# Patient Record
Sex: MALE | Race: WHITE | NOT HISPANIC OR LATINO | Employment: OTHER | URBAN - METROPOLITAN AREA
[De-identification: names, ages, dates, MRNs, and addresses within clinical notes are randomized per-mention and may not be internally consistent; named-entity substitution may affect disease eponyms.]

---

## 2017-02-27 ENCOUNTER — APPOINTMENT (OUTPATIENT)
Dept: LAB | Facility: CLINIC | Age: 67
End: 2017-02-27
Payer: MEDICARE

## 2017-02-27 ENCOUNTER — TRANSCRIBE ORDERS (OUTPATIENT)
Dept: LAB | Facility: CLINIC | Age: 67
End: 2017-02-27

## 2017-02-27 ENCOUNTER — ALLSCRIPTS OFFICE VISIT (OUTPATIENT)
Dept: OTHER | Facility: OTHER | Age: 67
End: 2017-02-27

## 2017-02-27 DIAGNOSIS — I10 ESSENTIAL (PRIMARY) HYPERTENSION: ICD-10-CM

## 2017-02-27 LAB
ALBUMIN SERPL BCP-MCNC: 3.9 G/DL (ref 3.5–5)
ALP SERPL-CCNC: 76 U/L (ref 46–116)
ALT SERPL W P-5'-P-CCNC: 30 U/L (ref 12–78)
ANION GAP SERPL CALCULATED.3IONS-SCNC: 7 MMOL/L (ref 4–13)
AST SERPL W P-5'-P-CCNC: 15 U/L (ref 5–45)
BILIRUB SERPL-MCNC: 0.8 MG/DL (ref 0.2–1)
BUN SERPL-MCNC: 24 MG/DL (ref 5–25)
CALCIUM SERPL-MCNC: 9 MG/DL (ref 8.3–10.1)
CHLORIDE SERPL-SCNC: 103 MMOL/L (ref 100–108)
CHOLEST SERPL-MCNC: 107 MG/DL (ref 50–200)
CO2 SERPL-SCNC: 30 MMOL/L (ref 21–32)
CREAT SERPL-MCNC: 0.99 MG/DL (ref 0.6–1.3)
GFR SERPL CREATININE-BSD FRML MDRD: >60 ML/MIN/1.73SQ M
GLUCOSE SERPL-MCNC: 87 MG/DL (ref 65–140)
HDLC SERPL-MCNC: 44 MG/DL (ref 40–60)
LDLC SERPL CALC-MCNC: 36 MG/DL (ref 0–100)
POTASSIUM SERPL-SCNC: 4.4 MMOL/L (ref 3.5–5.3)
PROT SERPL-MCNC: 7.2 G/DL (ref 6.4–8.2)
SODIUM SERPL-SCNC: 140 MMOL/L (ref 136–145)
TRIGL SERPL-MCNC: 137 MG/DL

## 2017-02-27 PROCEDURE — 36415 COLL VENOUS BLD VENIPUNCTURE: CPT

## 2017-02-27 PROCEDURE — 80053 COMPREHEN METABOLIC PANEL: CPT

## 2017-02-27 PROCEDURE — 80061 LIPID PANEL: CPT

## 2017-03-18 ENCOUNTER — OFFICE VISIT (OUTPATIENT)
Dept: URGENT CARE | Age: 67
End: 2017-03-18
Payer: MEDICARE

## 2017-03-18 PROCEDURE — 99213 OFFICE O/P EST LOW 20 MIN: CPT | Performed by: FAMILY MEDICINE

## 2017-03-18 PROCEDURE — G0463 HOSPITAL OUTPT CLINIC VISIT: HCPCS | Performed by: FAMILY MEDICINE

## 2017-04-25 DIAGNOSIS — I10 ESSENTIAL (PRIMARY) HYPERTENSION: ICD-10-CM

## 2017-04-25 DIAGNOSIS — I25.10 ATHEROSCLEROTIC HEART DISEASE OF NATIVE CORONARY ARTERY WITHOUT ANGINA PECTORIS: ICD-10-CM

## 2017-05-09 ENCOUNTER — HOSPITAL ENCOUNTER (OUTPATIENT)
Dept: NON INVASIVE DIAGNOSTICS | Facility: CLINIC | Age: 67
Discharge: HOME | End: 2017-05-09
Payer: MEDICARE

## 2017-05-09 DIAGNOSIS — I10 ESSENTIAL (PRIMARY) HYPERTENSION: ICD-10-CM

## 2017-05-09 DIAGNOSIS — I25.10 ATHEROSCLEROTIC HEART DISEASE OF NATIVE CORONARY ARTERY WITHOUT ANGINA PECTORIS: ICD-10-CM

## 2017-05-09 LAB
MAX DIASTOLIC BP: 74 MMHG
MAX HEART RATE: 133 BPM
MAX PREDICTED HEART RATE: 152 BPM
MAX. SYSTOLIC BP: 152 MMHG
PROTOCOL NAME: NORMAL
REASON FOR TERMINATION: NORMAL
TARGET HR FORMULA: NORMAL
TEST INDICATION: NORMAL
TIME IN EXERCISE PHASE: 180 S

## 2017-05-09 PROCEDURE — 93017 CV STRESS TEST TRACING ONLY: CPT

## 2017-05-17 ENCOUNTER — TRANSCRIBE ORDERS (OUTPATIENT)
Dept: ADMINISTRATIVE | Facility: HOSPITAL | Age: 67
End: 2017-05-17

## 2017-05-17 DIAGNOSIS — I25.10 ATHEROSCLEROSIS OF NATIVE CORONARY ARTERY WITHOUT ANGINA PECTORIS, UNSPECIFIED WHETHER NATIVE OR TRANSPLANTED HEART: Primary | ICD-10-CM

## 2017-06-20 ENCOUNTER — HOSPITAL ENCOUNTER (OUTPATIENT)
Dept: NON INVASIVE DIAGNOSTICS | Facility: CLINIC | Age: 67
Discharge: HOME | End: 2017-06-20
Payer: MEDICARE

## 2017-06-20 DIAGNOSIS — I25.10 ATHEROSCLEROSIS OF NATIVE CORONARY ARTERY WITHOUT ANGINA PECTORIS, UNSPECIFIED WHETHER NATIVE OR TRANSPLANTED HEART: ICD-10-CM

## 2017-06-20 LAB
CHEST PAIN STATEMENT: NORMAL
MAX DIASTOLIC BP: 72 MMHG
MAX HEART RATE: 122 BPM
MAX PREDICTED HEART RATE: 152 BPM
MAX. SYSTOLIC BP: 118 MMHG
PROTOCOL NAME: NORMAL
REASON FOR TERMINATION: NORMAL
TARGET HR FORMULA: NORMAL
TEST INDICATION: NORMAL
TIME IN EXERCISE PHASE: 180 S

## 2017-06-20 PROCEDURE — 93017 CV STRESS TEST TRACING ONLY: CPT

## 2017-06-20 PROCEDURE — A9502 TC99M TETROFOSMIN: HCPCS

## 2017-06-20 PROCEDURE — 78452 HT MUSCLE IMAGE SPECT MULT: CPT

## 2017-06-20 RX ADMIN — REGADENOSON 0.4 MG: 0.08 INJECTION, SOLUTION INTRAVENOUS at 13:34

## 2017-06-21 ENCOUNTER — GENERIC CONVERSION - ENCOUNTER (OUTPATIENT)
Dept: OTHER | Facility: OTHER | Age: 67
End: 2017-06-21

## 2017-08-18 ENCOUNTER — ALLSCRIPTS OFFICE VISIT (OUTPATIENT)
Dept: OTHER | Facility: OTHER | Age: 67
End: 2017-08-18

## 2018-01-11 NOTE — RESULT NOTES
Verified Results  * NM MYOCARDIAL PERFUSION SPECT (STRESS AND/OR REST) [id number] 11:19AM [facility]     Test Name Result Flag Reference   NM MYOCARDIAL PERFUSION SPECT (STRESS AND/OR REST) (Report)     [facility] [address] [place] [address]   [phone]     Rest/Stress Gated SPECT Myocardial Perfusion Imaging After Regadenoson     Patient: Saurav Smith   MR number: [id number]   Account number: [de-identified]   : 1950   Age: 74 years   Gender: Male   Status: Outpatient   Location: [facility]   Height: 70 in   Weight: 280 lb   BP: 118/ 82 mmHg     Allergies: NO KNOWN ALLERGIES     Diagnosis: I25 10 - Atherosclerotic heart disease of native coronary artery without angina pectoris     Primary Physician: Jacki Funez DO   RN: Heidy Brar RN   Referring Physician: Mirna Darden MD   Technician: Dionte Mir   Group: [facility]   Report Prepared By[de-identified] Heidy Brar RN   Interpreting Physician: Timothy Hurtado DO     INDICATIONS: Evaluation of known coronary artery disease  HISTORY: The patient is a 74year old  male  Chest pain status: no chest pain  Coronary artery disease risk factors: dyslipidemia and hypertension  Cardiovascular history: coronary artery disease  Prior cardiovascular procedures:   percutaneous transluminal coronary angioplasty with a stent placed  Co-morbidity: obesity  Medications: a beta blocker, an ACE inhibitor/ARB, aspirin, and a lipid lowering agent  PHYSICAL EXAM: Baseline physical exam screening: no wheezes audible  REST ECG: Normal sinus rhythm  PROCEDURE: The study was performed at the [facility]  A regadenoson infusion pharmacologic stress test was performed  Gated SPECT myocardial perfusion imaging was performed after stress  Systolic blood pressure was   118 mmHg, at the start of the study   Diastolic blood pressure was 82 mmHg, at the start of the study  The heart rate was 76 bpm, at the start of the study  IV double checked  Regadenoson protocol:   HR bpm SBP mmHg DBP mmHg Symptoms   Baseline 76 118 72 none   Immediate 122 100 70 mild dyspnea   1 min 93 110 76 none   No medications or fluids given  STRESS SUMMARY: Duration of pharmacologic stress was 3 min and 0 sec  Maximal heart rate during stress was 122 bpm ( 80 % of maximal predicted heart rate)  There was normal resting blood pressure with an appropriate response to stress  The   rate-pressure product for the peak heart rate and blood pressure was 10014  There was no chest pain during stress  The stress test was terminated due to protocol completion  Pre oxygen saturation: 98 %  Peak oxygen saturation: 97 %  Arrhythmia during stress: isolated premature ventricular beats  The stress ECG was non-diagnostic  ISOTOPE ADMINISTRATION:   Resting isotope administration Stress isotope administration   Agent Tetrofosmin Tetrofosmin   Dose 16 5 mCi 48 mCi   Date 06/20/2017 06/20/2017   Injection-image interval 37 min 40 min     The radiopharmaceutical was injected at the peak effect of pharmacologic stress  MYOCARDIAL PERFUSION IMAGING:   The image quality was fair  Rotating projection images reveal moderate diaphragmatic attenuation  Left ventricular size was normal  The TID ratio was 0 97  PERFUSION DEFECTS:   - There was a moderate-sized, moderately severe, partially reversible myocardial perfusion defect of the inferior wall  Prone imaging was performed and the defect improved  GATED SPECT:   The calculated left ventricular ejection fraction was 47 %  There was no left ventricular regional abnormality  SUMMARY:   - Stress results: Target heart rate was not achieved  There was no chest pain during stress  - ECG conclusions: The stress ECG was non-diagnostic    - Perfusion imaging:  There was a moderate-sized, moderately severe, partially reversible myocardial perfusion defect of the inferior wall  Prone imaging was performed and the defect improved  - Gated SPECT: The calculated left ventricular ejection fraction was 47 %  There was no left ventricular regional abnormality  IMPRESSIONS: Normal study after pharmacologic vasodilation  Partially reversible defect of the inferior wall which improved with prone imaging likely representing diaphragmatic attenuation       Prepared and signed by     Dougie Art DO   Signed 06/20/2017 18:03:15

## 2018-01-12 NOTE — PROGRESS NOTES
Assessment    1  Degenerative lumbar spinal stenosis (724 02) (M48 06)   2  Adolescent idiopathic scoliosis of lumbar region (737 30) (M41 126)   3  Lumbosacral radiculopathy at L4 (724 4) (M54 17)    Plan  Adolescent idiopathic scoliosis of lumbar region    · * MRI LUMBAR SPINE WO CONTRAST; Status:Hold For - Scheduling; Requested  for:13Nqv3566;    · Follow Up After Tests Complete Evaluation and Treatment  Follow-up  Status: Hold For -  Scheduling  Requested for: 35AET2703  Adolescent idiopathic scoliosis of lumbar region, Degenerative lumbar spinal stenosis,  Lumbosacral radiculopathy at L4    · MethylPREDNISolone 4 MG Oral Tablet Therapy Pack; Take according to directions    Discussion/Summary  Impression: low back pain, disc herniation and radiculopathy  Currently, the condition is severe  The diagnostic plan includes lumbar spine MRI  Medication changes are as documented in orders  Patient discussion: discussed with the patient  No work until after next office visit  Chief Complaint  Acute LBP, ED referral       History of Present Illness  76 yo male with some chronic LBP, took OTC NSAIDS, week ago got progressively worse LBP with radiation down lateral thighs  NOT into feet, no numbness in toes  No loss of sphincter control  Seen in ED [facility] today, plain films neg for fracture  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, truck diver  Review of Systems    Constitutional: No fever or chills, feels well, no tiredness, no recent weight loss or weight gain  Eyes: No complaints of red eyes, no eyesight problems  Cardiovascular: CAD stents  Respiratory: No complaints of shortness of breath, no wheezing, no cough  Gastrointestinal: No complaints of abdominal pain, no constipation, no nausea or vomiting, no diarrhea or bloody stools  Genitourinary: No complaints of dysuria or incontinence, no hesitancy, no nocturia  Musculoskeletal: as noted in HPI     Integumentary: melanoma removal    Neurological: No complaints of headache, no confusion, no numbness or tingling, no dizziness  Psychiatric: No suicidal thoughts, no anxiety, no depression  Endocrine: No muscle weakness, no frequent urination, no excessive thirst, no feelings of weakness  Active Problems    1  Back pain (724 5) (M54 9)   2  Benign essential hypertension (401 1) (I10)   3  Coronary artery disease (414 00) (I25 10)   4  History of Malignant melanoma of back (172 5) (C43 59)   5  Obesity (278 00) (E66 9)    Past Medical History    1  History of Abnormal stress test (794 39) (R94 39)   2  History of Arthritis (V13 4)   3  History of Chest pain (786 50) (R07 9)   4  History of Edema, lower extremity (782 3) (R60 0)   5  History of Malignant melanoma of back (172 5) (C43 59)   6  History of Screening for prostate cancer (V76 44) (Z12 5)    The active problems and past medical history were reviewed and updated today  Surgical History    1  History of Excision Of Lesion Trunk Malignant   2  History of [place] Lymph Node Biopsy   3  History of Transcath Placement Of Intrathoracic Carotid Artery Stent    Family History  Mother    1  Family history of ALS (amyotrophic lateral sclerosis)  Father    2  Family history of Carcinoma Of The Pancreas   3  Family history of Colon Cancer (V16 0)    The family history was reviewed and updated today  Social History    · Denied: History of Alcohol Use (History)   · Former smoker (V15 82) (N01 781)   ·    · History of Never A Smoker  The social history was reviewed and is unchanged  Current Meds   1  Aspirin 81 MG TABS; 1 DAILY; Last Rx:22Jan2016 Ordered   2  Atorvastatin Calcium 40 MG Oral Tablet; TAKE 1 TABLET DAILY AT BEDTIME; Therapy: 12UBB5643 to (Last Rx:20Oct2015)  Requested for: 20Oct2015 Ordered   3  Clopidogrel Bisulfate 75 MG Oral Tablet; TAKE 1 TABLET DAILY; Therapy: 27LRI3461 to (Evaluate:41Cnm0506)  Requested for: 14UGS7986; Last   Rx:22Jan2016 Ordered   4  Lisinopril-Hydrochlorothiazide 10-12 5 MG Oral Tablet; One daily; Therapy: 13GXC7578 to (Evaluate:14Oct2016)  Requested for: 38XLG0720; Last   Rx:33Hcw8883 Ordered   5  Metoprolol Succinate ER 25 MG Oral Tablet Extended Release 24 Hour; Take 1 tablet   daily; Therapy: 62UHL1209 to (Evaluate:79Jvm3403)  Requested for: 16Pic8632; Last   Rx:70Uas2882 Ordered   6  Nitrostat 0 4 MG Sublingual Tablet Sublingual; DISSOLVE 1 TABLET UNDER THE   TONGUE AS NEEDED FOR CHEST PAIN;   Therapy: 13XGN7164 to (Evaluate:56Uda5708)  Requested for: 51TXU1052; Last   Rx:65Ags1456 Ordered    The medication list was reviewed and updated today  Allergies    1  No Known Drug Allergies    2  Seasonal    Vitals  Signs [Data Includes: Current Encounter]   Recorded: 17JAR7097 03:07PM   Heart Rate: 76  Systolic: 915  Diastolic: 78  Weight: 079 lb   BMI Calculated: 40 18  BSA Calculated: 2 41    Physical Exam  able to heel and toe wealk in place and squat  Very tight hip flexors, ++ PPT both ITBs, ++ RSLR with groin and LBP  Trochanters neg, SLR Neg bilat for root tension  Constitutional - General appearance: Abnormal  obese  Musculoskeletal - Gait and station: Normal    Neurologic - Cranial nerves: Normal  Reflexes: Abnormal  Deep tendon reflexes: 1+ right biceps, 1+ left biceps, 1+ right triceps, 1+ left triceps, 1+ right brachioradialis, 1+ left brachioradialis, 0 right patella, 0 left patella, 1+ right ankle jerk and 1+ left ankle jerkno ankle clonus on the right and no ankle clonus on the left  Psychiatric - Orientation to person, place, and time: Normal  Mood and affect: Normal    Eyes   Pupils and irises: Normal        Results/Data  I personally reviewed the films/images/results in the office today  My interpretation follows  X-ray Review Scoliosis, marked DDD, DJD, osteophytes, no obvious listhesis  Future Appointments    Date/Time Provider Specialty Site   09/13/2016 09:30 AM BRY Fernandes   [facility]     Signatures   Electronically signed by : Jose L Sharma DO; May  5 2016  3:55PM EST                       (Author)

## 2018-01-13 NOTE — CONSULTS
Chief Complaint  Pt here for 6 month f/u  pt states he is feeling well with no cardiac concerns  Meds are correct per pt  I refilled meds he requested      History of Present Illness  Ingrid Tolentino is here for followup  He has no chest discomfort, no dyspnea, overall doing well  No issues with his medical therapy  The patient states he has been stable with his coronary artery disease symptoms since the last visit  Symptoms: denies chest pain when at rest, denies exertional chest pain, denies dyspnea, denies fatigue and denies exercise intolerance  Associated symptoms include no syncope, no palpitations, no PND, no orthopnea and no edema  He denies nitroglycerin use since the last visit  Review of Systems      Cardiac: No complaints of chest pain, no palpitations, no fainiting  Skin: No complaints of nonhealing sores or skin rash  Genitourinary: No complaints of recurrent urinary tract infections, frequent urination at night, difficult urination, blood in urine, kidney stones, loss of bladder control, no kidney or prostate problems, no erectile dysfunction  Psychological: No complaints of feeling depressed, anxiety, panic attacks, or difficulty concentrating  General: No complaints of trouble sleeping, lack of energy, fatigue, appetite changes, weight changes, fever, frequent infections, or night sweats  Respiratory: No complaints of shortness of breath, cough with sputum, or wheezing  HEENT: No complaints of serious problems, hearing problems, nose problems, throat problems, or snoring  Gastrointestinal: No complaints of liver problems, nausea, vomiting, heartburn, constipation, bloody stools, diarrhea, problems swallowing, adbominal pain, or rectal bleeding  Hematologic: No complaints of bleeding disorders, anemia, blood clots, or excessive brusing     Neurological: No complaints of numbness, tingling, dizziness, weakness, seizures, headaches, syncope or fainting, AM fatigue, daytime sleepiness, no witnessed apnea episodes  Musculoskeletal: No complaints of arthritis, back pain, or painfull swelling  ROS reviewed  Active Problems    1  Back pain (724 5) (M54 9)   2  Benign essential hypertension (401 1) (I10)   3  Coronary artery disease (414 00) (I25 10)   4  History of Malignant melanoma of back (172 5) (C43 59)   5  Obesity (278 00) (E66 9)    Past Medical History    · History of Abnormal stress test (794 39) (R94 39)   · History of Arthritis (V13 4)   · History of Chest pain (786 50) (R07 9)   · History of Edema, lower extremity (782 3) (R60 0)   · History of Malignant melanoma of back (172 5) (C43 59)   · History of Screening for prostate cancer (V76 44) (Z12 5)    The active problems and past medical history were reviewed and updated today  Surgical History    · History of Excision Of Lesion Trunk Malignant   · History of [place] Lymph Node Biopsy   · History of Transcath Placement Of Intrathoracic Carotid Artery Stent    The surgical history was reviewed and updated today  Family History    · Family history of ALS (amyotrophic lateral sclerosis)    · Family history of Carcinoma Of The Pancreas   · Family history of Colon Cancer (V16 0)    The family history was reviewed and updated today  Social History    · Denied: History of Alcohol Use (History)   · Former smoker (V15 82) (G58 753)   ·    · History of Never A Smoker  The social history was reviewed and updated today  Current Meds   1  Aspirin 81 MG Oral Tablet; Take 2 tablets daily; Therapy: (Recorded:22Jan2016) to Recorded   2  Atorvastatin Calcium 40 MG Oral Tablet; TAKE 1 TABLET DAILY AT BEDTIME; Therapy: 86GRW1729 to (Last Rx:20Oct2015)  Requested for: 20Oct2015 Ordered   3  Clopidogrel Bisulfate 75 MG Oral Tablet; TAKE 1 TABLET DAILY; Therapy: 68NSH1743 to (Priyanka Plbryce)  Requested for: 57BGD4444; Last   Rx:02Mar2015 Ordered   4  Lisinopril-Hydrochlorothiazide 10-12 5 MG Oral Tablet;  One daily; Therapy: 12QJN9962 to (Evaluate:14Oct2016)  Requested for: 10XZQ4225; Last   Rx:20Oct2015 Ordered   5  Metoprolol Succinate ER 25 MG Oral Tablet Extended Release 24 Hour; Take 1 tablet   daily; Therapy: 62JFF6662 to (Nikhil Right)  Requested for: 17ZKJ8938; Last   Rx:02Mar2015 Ordered   6  Nitrostat 0 4 MG Sublingual Tablet Sublingual; DISSOLVE 1 TABLET UNDER THE   TONGUE AS NEEDED FOR CHEST PAIN;   Therapy: 43XAA2502 to (Evaluate:39Tsc6991)  Requested for: 79KKW6691; Last   Rx:26Jan2015 Ordered    The medication list was reviewed and updated today  Allergies    1  No Known Drug Allergies    2  Seasonal    Vitals   Recorded: 88OLY3595 09:13AM   Heart Rate 61   Systolic 040   Diastolic 80   Height 5 ft 10 in   Weight 268 lb 3 04 oz   BMI Calculated 38 48   BSA Calculated 2 37   O2 Saturation 97     Physical Exam    Constitutional   General appearance: No acute distress, well appearing and well nourished  Eyes   Conjunctiva and Sclera examination: Conjunctiva pink, sclera anicteric  Ears, Nose, Mouth, and Throat - Oropharynx: Clear, nares are clear, mucous membranes are moist    Neck   Neck and thyroid: Normal, supple, trachea midline, no thyromegaly  Pulmonary   Respiratory effort: No increased work of breathing or signs of respiratory distress  Auscultation of lungs: Clear to auscultation, no rales, no rhonchi, no wheezing, good air movement  Cardiovascular   Auscultation of heart: Normal rate and rhythm, normal S1 and S2, no murmurs  Carotid pulses: Normal, 2+ bilaterally  Peripheral vascular exam: Normal pulses throughout, no tenderness, erythema or swelling  Pedal pulses: Normal, 2+ bilaterally  Examination of extremities for edema and/or varicosities: Normal     Abdomen   Abdomen: Non-tender and no distention  Liver and spleen: No hepatomegaly or splenomegaly  Musculoskeletal Gait and station: Normal gait   Digits and nails: Normal without clubbing or cyanosis  Inspection/palpation of joints, bones, and muscles: Normal, ROM normal     Skin - Skin and subcutaneous tissue: Normal without rashes or lesions  Skin is warm and well perfused, normal turgor  Neurologic - Speech: Normal     Psychiatric - Orientation to person, place, and time: Normal  Mood and affect: Normal       Results/Data  I personally reviewed the recording/images in the office today  My interpretation follows  (1) LIPID PANEL FASTING W DIRECT LDL REFLEX 61TKS9959 11:08AM [facility] Order Number: [id number]     Test Name Result Flag Reference   TRIGLYCERIDES 121 mg/dL     TRIGLYCERIDE:  Normal              <150 mg/dl  Borderline High    150-199 mg/dl  High               200-499 mg/dl  Very High          >499 mg/dl  _______________________________________   CHOLESTEROL 107 mg/dL     CHOLESTEROL:  Desirable        <200 mg/dl  Borderline High  200-239 mg/dl  High             >239 mg/dl  ____________________________________   HDL,DIRECT 36 mg/dL     HDL:  High       >59 mg/dl  Low        <41 mg/dl  ______________________________   LDL CHOLESTEROL CALCULATED 47 mg/dL  0-100   LDL, CALCULATED:  This screening LDL is a calculated result  It does not have the accuracy of the Direct Measured LDL in the  monitoring of patients with hyperlipidemia and/or statin therapy  Direct Measured LDL (Test Code 3126) must be ordered separately in these  patients   ______________________________     (1) HEPATIC FUNCTION PANEL 10Fbf3103 11:08AM [facility] Order Number: [id number]     Test Name Result Flag Reference   BILI, TOTAL 1 3 mg/dL H 0 2-1 0   TOTAL PROTEIN 7 7 g/dL  6 4-8 2   ALK PHOSPHATAS 81 U/L     ALT (SGPT) 14 U/L  14-59   New Reference Range   AST(SGOT) 34 U/L  10-42   BILI, DIRECT 0 3 mg/dL  0 0-0 3   ALBUMIN 4 0 g/dL  3 5-5 0     Assessment    1  Coronary artery disease (414 00) (I25 10)   2   Benign essential hypertension (401 1) (I10)    Plan  Coronary artery disease    · Clopidogrel Bisulfate 75 MG Oral Tablet; TAKE 1 TABLET DAILY   Rx By: Shell Fuentes; Dispense: 30 Days ; #:30 Tablet; Refill: 0; For: Coronary artery disease; FEDE = N; Verified Transmission to [facility]; Last Updated By: System, [employer]; 1/22/2016 9:28:24 AM   · Follow-up visit in 6 months Evaluation and Treatment  Follow-up  Status: Complete   Done: 24TSQ9897   Ordered; For: Coronary artery disease; Ordered By: Shell Fuentes Performed:  Due: 49BJK5242; Last Updated By: Liseth Manzanares; 1/22/2016 9:32:32 AM  Health Maintenance    · From  Aspirin 81 MG Oral Tablet Take 2 tablets daily To Aspirin 81 MG Oral  Tablet 1 DAILY   Rx By: Shell Fuentes; Dispense: 0 Days ; #:30 Tablet; Refill: 1; For: Health Maintenance; FEDE = N; Record    Discussion/Summary    1  Coronary Artery Disease: Overall doing well  Asymptomatic  Continue above medical therapy  I ordered a lipid panel  Dual antiplalelet therapy for at least 1 year  Okay to stop clopidogrel after february  LDL at goal      2  HTN: BP well controlled on current medical therapy  The patient was counseled regarding diagnostic results, instructions for management, risk factor reductions, impressions  total time of encounter was 25 minutes and 15 minutes was spent counseling        Future Appointments    Signatures   Electronically signed by : BRY Hughes ; Jan 22 2016  9:33AM EST                       (Author)

## 2018-01-14 VITALS
BODY MASS INDEX: 41.52 KG/M2 | HEIGHT: 70 IN | HEART RATE: 74 BPM | WEIGHT: 290 LBS | SYSTOLIC BLOOD PRESSURE: 122 MMHG | DIASTOLIC BLOOD PRESSURE: 60 MMHG

## 2018-01-14 VITALS
BODY MASS INDEX: 40.96 KG/M2 | HEIGHT: 70 IN | SYSTOLIC BLOOD PRESSURE: 138 MMHG | HEART RATE: 71 BPM | DIASTOLIC BLOOD PRESSURE: 76 MMHG | WEIGHT: 286.13 LBS

## 2018-01-15 NOTE — MISCELLANEOUS
Message  Lavonne (long term POA) called about no effect from meds (see task)   and had MRI this am spent 35 minutes trying to get a stat reading from [facility]   in interim she showed up here to get a stronger pain med   showed her mri and explained nature of severe stenosis and pinched nerves in both legs   switched oxy to tapentadol 75 mg, stop oxy    finish Medrol and sent for ASAP Pain Medicine consult for MATTHEW   discussed possibility of need for surgery  Plan  Adolescent idiopathic scoliosis of lumbar region    · Follow Up After Tests Complete Evaluation and Treatment  Follow-up  Status: Complete   Done: 48UEV5533   · * MRI LUMBAR SPINE WO CONTRAST; Status:Complete;   Done: 40UVP8140 09:22AM  Adolescent idiopathic scoliosis of lumbar region, Degenerative lumbar spinal stenosis,  Lumbosacral radiculopathy at L4    · MethylPREDNISolone 4 MG Oral Tablet Therapy Pack;  Take according to  directions  Back pain, Degenerative lumbar spinal stenosis, Lumbosacral radiculopathy at L4    · Nucynta 75 MG Oral Tablet; one tablet every 6 hours as needed for severe pain up  to four doses in 24 hours  Lumbosacral radiculopathy at L4    · 1 - Tony Senior MD, Kriss DORADO (Pain Management) Physician Referral  Consult L-3  radiculopathy with reflex changes, marked stenosis  Status: Active  Requested for:  70VCJ4060  Care Summary provided  : Yes    Signatures   Electronically signed by : Micah Rust DO; May  6 2016  4:52PM EST                       (Author)

## 2018-01-15 NOTE — MISCELLANEOUS
Message   Recorded as Task   Date: 05/12/2016 03:43 PM, Created By: John Cline   Task Name: Follow Up   Assigned To: Maribeth Fernandez   Regarding Patient: Gianni Cooper, Status: In Progress   Comment:    Dorys Boucher - 12 May 2016 3:43 PM     TASK CREATED  Patient was seen by Dr Nancy Morse todayfor back pain  Dr Nancy Morse ordered a LESI> I went into the room and asked the patient if he was on any blood thinning medication ie  Warfarin or Aspirin  Patient stated he was taking Aspirin because his Cardiologist, Dr Edwige Garza instructed him to  I informed the patient that we would not be able to schedule the LESI until his cardiologist said it was ok to stop the aspirin in order to get this procedure done  Anne-Marie, who was in the room with him said "No he needs to have this done now"  I informed her i will fax the hold request right now but that we couldn't schedule without the Aspirin hold being authorized and she pointed her finger at me and told me that i was going to schedule it today and again I told her that we couldn't schedule her wiothout the cardiologist saying it was ok to hold the Aspirin  Anne-Marie was very upset and in a loud and rude tone she asked "What if I take the form to his cardiologist in [facility] and bring it back signed  I  informed her that if thats what she wants to do that fine  I still faxed the form over to Dr Joselin pham  Patient and Britni proceeded to wait for a while and then decided to leave  The patient and Britni returned in the afternoon stating that she just spoke to Dr Joselin pham and that they were going to fax the Aspirin hold to us and that i should call them  I informed her that if they just faxed it that we should give it a few minutes so it can come through  Anne-Marie insisted in call so I did call  Dr Joselin Green office and they said that they faxed it  It went to [facility]  I gave them another fax [phone] and ask if they can it again   The form was refaxed but it didn't go through the number i provided  In the mean time the patient was being very nasty to the Ortho girls ( [place] [facility] and [facility]) pointing her finger and stating that she is going to [address] [place] [address] to get the hold and when she comes back we better schedule her immediately and left the office  I asked Ashlee at [facility] to see if she could help me get from the [facility] fax and she did  I then gave the Aspirin hold to Dorita Villarreal to schedule the procedure  Dorita Villarreal called the patient and left a message saying that she  already obtained the hold to call our office to schedule the procedure  MikeCarl - 90 May 2016 9:08 AM     TASK EDITED  aware        Active Problems    1  Adolescent idiopathic scoliosis of lumbar region (737 30) (M41 126)   2  Back pain (724 5) (M54 9)   3  Benign essential hypertension (401 1) (I10)   4  Coronary artery disease (414 00) (I25 10)   5  Degenerative lumbar spinal stenosis (724 02) (M48 06)   6  Low back pain (724 2) (M54 5)   7  Lumbar disc herniation with radiculopathy (722 10) (M51 16)   8  Lumbar spinal stenosis (724 02) (M48 06)   9  Lumbosacral radiculopathy at L4 (724 4) (M54 17)   10  History of Malignant melanoma of back (172 5) (C43 59)   11  Obesity (278 00) (E66 9)    Current Meds   1  Aspirin 81 MG TABS; 1 DAILY; Last Rx:22Jan2016 Ordered   2  Atorvastatin Calcium 40 MG Oral Tablet; TAKE 1 TABLET DAILY AT BEDTIME; Therapy: 76QMW5906 to (Last Rx:20Oct2015)  Requested for: 20Oct2015 Ordered   3  Clopidogrel Bisulfate 75 MG Oral Tablet; TAKE 1 TABLET DAILY; Therapy: 76BWA6077 to (Evaluate:99Gtn4549)  Requested for: 21ZGT3656; Last   Rx:22Jan2016 Ordered   4  Lisinopril-Hydrochlorothiazide 10-12 5 MG Oral Tablet; One daily; Therapy: 29XCS9664 to (Evaluate:14Oct2016)  Requested for: 10FSK7268; Last   Rx:20Oct2015 Ordered   5  MethylPREDNISolone 4 MG Oral Tablet Therapy Pack; Take according to directions; Therapy: 43NRQ7045 to (Last FN:06IVM9120)  Requested for: 11WLA0874 Ordered   6   Metoprolol Succinate ER 25 MG Oral Tablet Extended Release 24 Hour; Take 1 tablet   daily; Therapy: 62AIE7574 to (Evaluate:04Gku9424)  Requested for: 79Aku7159; Last   Rx:15Apr2016 Ordered   7  Nitrostat 0 4 MG Sublingual Tablet Sublingual; DISSOLVE 1 TABLET UNDER THE   TONGUE AS NEEDED FOR CHEST PAIN;   Therapy: 10QQX9521 to (Evaluate:08Gun1458)  Requested for: 60NAZ9621; Last   Rx:26Jan2015 Ordered   8  Nucynta 75 MG Oral Tablet; one tablet every 6 hours as needed for severe pain up to four   doses in 24 hours; Therapy: 30JIR4890 to (Last UK:59LCE1670) Ordered    Allergies    1  No Known Drug Allergies    2   Seasonal    Signatures   Electronically signed by : Sammie Senior, ; May 18 2016  9:09AM EST                       (Author)

## 2018-01-16 NOTE — MISCELLANEOUS
Message   Recorded as Task   Date: 05/27/2016 09:54 AM, Created By: Cheryl Fleming   Task Name: Follow Up   Assigned To: [facility] [address] Procedure,Team   Regarding Patient: Elsy Mello, Status: Active   Comment:    Dorys Boucher - 27 May 2016 9:54 AM     TASK CREATED  L/m to return call  S/p L2-L3 LESI /ASPIRIN done 5/20/16 by Dorys Martinez - 27 May 2016 10:21 AM     TASK EDITED   Dorys Boucher - 03 Jun 2016 2:08 PM     TASK EDITED  Patient was called on 6/2/16  I l/m to return call  S/p L2-L3 LESI /ASPIRIN done 5/20/16 done by Eli Bonner - 07 Jun 2016 12:53 PM     TASK EDITED  Pt lmom stating that he had an epidural and was calling back  His pain has been reduced and he was able to return to work  " Saved my life, its a miracle and thank you very much "   [address] [employer] [address] - 07 Jun 2016 1:03 PM     TASK REPLIED TO: Previously Assigned To [place]  Thanks  Active Problems    1  Adolescent idiopathic scoliosis of lumbar region (737 30) (M41 126)   2  Back pain (724 5) (M54 9)   3  Benign essential hypertension (401 1) (I10)   4  Coronary artery disease (414 00) (I25 10)   5  Degenerative lumbar spinal stenosis (724 02) (M48 06)   6  Low back pain (724 2) (M54 5)   7  Lumbar disc herniation with radiculopathy (722 10) (M51 16)   8  Lumbar spinal stenosis (724 02) (M48 06)   9  Lumbosacral radiculopathy at L4 (724 4) (M54 17)   10  History of Malignant melanoma of back (172 5) (C43 59)   11  Obesity (278 00) (E66 9)    Current Meds   1  Aspirin 81 MG TABS; 1 DAILY; Last Rx:22Jan2016 Ordered   2  Atorvastatin Calcium 40 MG Oral Tablet; TAKE 1 TABLET DAILY AT BEDTIME; Therapy: 91OSW1644 to (Last Rx:20Oct2015)  Requested for: 20Oct2015 Ordered   3  Clopidogrel Bisulfate 75 MG Oral Tablet; TAKE 1 TABLET DAILY; Therapy: 26QST4102 to (Evaluate:69Bvh9689)  Requested for: 77PJH5806; Last   Rx:22Jan2016 Ordered   4  Lisinopril-Hydrochlorothiazide 10-12 5 MG Oral Tablet;  One daily; Therapy: 09GKK2488 to (Evaluate:14Oct2016)  Requested for: 45NCR3807; Last   Rx:11Bzt6986 Ordered   5  MethylPREDNISolone 4 MG Oral Tablet Therapy Pack; Take according to directions; Therapy: 70QXB8430 to (Last MV:57XMZ2204)  Requested for: 72SDV2559 Ordered   6  Metoprolol Succinate ER 25 MG Oral Tablet Extended Release 24 Hour; Take 1 tablet   daily; Therapy: 42DDC5651 to (Evaluate:84Bey6431)  Requested for: 28Ujl3218; Last   Rx:67Kwm6399 Ordered   7  Nitrostat 0 4 MG Sublingual Tablet Sublingual; DISSOLVE 1 TABLET UNDER THE   TONGUE AS NEEDED FOR CHEST PAIN;   Therapy: 08DUL7696 to (Evaluate:85Kth1944)  Requested for: 50VIV6142; Last   Rx:26Jan2015 Ordered   8  Nucynta 75 MG Oral Tablet; one tablet every 6 hours as needed for severe pain up to four   doses in 24 hours; Therapy: 95YSD8540 to (Last YH:25BGI4729) Ordered    Allergies    1  No Known Drug Allergies    2   Seasonal    Signatures   Electronically signed by : Jose Crandall RN; Jun 7 2016  2:13PM EST                       (Author)

## 2018-01-16 NOTE — RESULT NOTES
Verified Results  * MRI LUMBAR SPINE [address] [employer] [id number] 09:22AM Eliana Mo Order Number: [id number]   Performing Comments: Pt had stent placed Feb 2015   has MR compatability info card with him  - Patient Instructions: To schedule this appointment, please contact Central Scheduling at [phone]  Test Name Result Flag Reference   MRI LUMBAR SPINE WO CONTRAST (Report)     MRI LUMBAR SPINE WITHOUT CONTRAST     INDICATION: Severe low back pain with bilateral lower extremity radiculopathy  COMPARISON: None  TECHNIQUE: Sagittal T1, sagittal T2, sagittal inversion recovery, axial T1 and axial T2, coronal T2       IMAGE QUALITY: Diagnostic     FINDINGS:     ALIGNMENT: Mild dextroscoliosis of the thoracolumbar spine  No compression fracture  No spondylolisthesis or spondylolysis  MARROW SIGNAL: Mild endplate marrow degenerative change  No focal marrow edema  DISTAL CORD AND CONUS: Normal size and signal within the distal cord and conus  The conus ends at the L1-L2 level  PARASPINAL SOFT TISSUES: Paraspinal soft tissues are unremarkable  SACRUM: Normal signal within the sacrum  No evidence of insufficiency or stress fracture  LOWER THORACIC DISC SPACES: Mild lower thoracic degenerative disc disease without disc herniation, canal stenosis or foraminal narrowing  LUMBAR DISC SPACES:        L1-L2: Disc desiccation and loss of disc height  Moderate diffuse annular bulging with broad-based left foraminal and lateral disc protrusion  Mild to moderate canal stenosis with moderately severe left foraminal narrowing  L2-L3: Moderate diffuse annular bulging  Small broad-based central disc herniation and small broad-based left foraminal disc herniation  Severe canal stenosis with moderately severe left and mild right foraminal narrowing  L3-L4: Mild diffuse annular bulging  Mild facet degenerative change  Mild canal stenosis without foraminal narrowing       L4-L5: Moderate diffuse annular bulging with broad-based right foraminal and far lateral disc protrusion  Mild facet arthropathy  Moderate canal stenosis and right foraminal narrowing  L5-S1: Moderate diffuse annular bulging  Broad-based right foraminal and lateral disc protrusion with endplate osteophyte formation  Mild canal stenosis  Moderately severe right foraminal narrowing  IMPRESSION:     Multilevel diffuse annular bulging with broad-based predominantly foraminal and lateral disc protrusions  Endplate and facet hypertrophic degenerative change noted  Central disc protrusion at L2-3 superimposed upon annular bulging and degenerative disc disease  There is also a left foraminal disc protrusion at this level  Severe canal stenosisl  There is moderately severe left foraminal narrowing at this level  Severe left foraminal narrowing at L1-2 and right greater than left foraminal narrowing at L4-5 and L5-S1  Spine surgery consultation recommended  Workstation performed: [id number]     Signed by:   Taisha Keller DO   5/6/16       Plan  Adolescent idiopathic scoliosis of lumbar region    · Follow Up After Tests Complete Evaluation and Treatment  Follow-up  Status: Complete   Done: 10BNL6490   · * MRI LUMBAR SPINE WO CONTRAST; Status:Complete;   Done: 28IJE1351 09:22AM  Adolescent idiopathic scoliosis of lumbar region, Degenerative lumbar spinal stenosis,  Lumbosacral radiculopathy at L4    · MethylPREDNISolone 4 MG Oral Tablet Therapy Pack; Take according to  directions  Back pain, Degenerative lumbar spinal stenosis, Lumbosacral radiculopathy at L4    · Nucynta 75 MG Oral Tablet; one tablet every 6 hours as needed for severe pain up  to four doses in 24 hours  Lumbosacral radiculopathy at L4    · 1 - Fercho Haddad MD, Jorge DORADO (Pain Management) Physician Referral  Consult L-3  radiculopathy with reflex changes, marked stenosis  Status: Active  Requested for:  69EVP3885  Care Summary provided   Monika Mckinney Yes

## 2018-01-17 NOTE — PROGRESS NOTES
Assessment  Assessed    1  Coronary artery disease (414 00) (I25 10)   2  Benign essential hypertension (401 1) (I10)    Plan  Coronary artery disease    · Clopidogrel Bisulfate 75 MG Oral Tablet; TAKE 1 TABLET DAILY   Rx By: Carlito Butt; Dispense: 30 Days ; #:30 Tablet; Refill: 0; For: Coronary artery disease; FEDE = N; Verified Transmission to [facility]; Last Updated By: System, [employer]; 1/22/2016 9:28:24 AM   · Follow-up visit in 6 months Evaluation and Treatment  Follow-up  Status: Complete   Done: 51NGW5683   Ordered; For: Coronary artery disease; Ordered By: Carlito Butt Performed:  Due: 25VHT6773; Last Updated By: Judd Villar; 1/22/2016 9:32:32 AM  Health Maintenance    · From  Aspirin 81 MG Oral Tablet Take 2 tablets daily To Aspirin 81 MG Oral  Tablet 1 DAILY   Rx By: Carlito Butt; Dispense: 0 Days ; #:30 Tablet; Refill: 1; For: Health Maintenance; FEDE = N; Record    Discussion/Summary  Cardiology Discussion Summary Free Text Note Form [facility]:   1  Coronary Artery Disease: Overall doing well  Asymptomatic  Continue above medical therapy  I ordered a lipid panel  Dual antiplalelet therapy for at least 1 year  Okay to stop clopidogrel after february  LDL at goal      2  HTN: BP well controlled on current medical therapy  Counseling Documentation With Imm: The patient was counseled regarding diagnostic results, instructions for management, risk factor reductions, impressions  total time of encounter was 25 minutes and 15 minutes was spent counseling  Chief Complaint  Chief Complaint Free Text Note Form: Pt here for 6 month f/u  pt states he is feeling well with no cardiac concerns  Meds are correct per pt  I refilled meds he requested      History of Present Illness  Cardiology HPI Free Text Note Form [facility]: Bharathi Gomez is here for followup  He has no chest discomfort, no dyspnea, overall doing well  No issues with his medical therapy  Coronary Artery Disease (Follow-Up):  The patient states he has been stable with his coronary artery disease symptoms since the last visit  Symptoms: denies chest pain when at rest, denies exertional chest pain, denies dyspnea, denies fatigue and denies exercise intolerance  Associated symptoms include no syncope, no palpitations, no PND, no orthopnea and no edema  He denies nitroglycerin use since the last visit  Review of Systems  Cardiology Male ROS:     Cardiac: No complaints of chest pain, no palpitations, no fainiting  Skin: No complaints of nonhealing sores or skin rash  Genitourinary: No complaints of recurrent urinary tract infections, frequent urination at night, difficult urination, blood in urine, kidney stones, loss of bladder control, no kidney or prostate problems, no erectile dysfunction  Psychological: No complaints of feeling depressed, anxiety, panic attacks, or difficulty concentrating  General: No complaints of trouble sleeping, lack of energy, fatigue, appetite changes, weight changes, fever, frequent infections, or night sweats  Respiratory: No complaints of shortness of breath, cough with sputum, or wheezing  HEENT: No complaints of serious problems, hearing problems, nose problems, throat problems, or snoring  Gastrointestinal: No complaints of liver problems, nausea, vomiting, heartburn, constipation, bloody stools, diarrhea, problems swallowing, adbominal pain, or rectal bleeding  Hematologic: No complaints of bleeding disorders, anemia, blood clots, or excessive brusing  Neurological: No complaints of numbness, tingling, dizziness, weakness, seizures, headaches, syncope or fainting, AM fatigue, daytime sleepiness, no witnessed apnea episodes  Musculoskeletal: No complaints of arthritis, back pain, or painfull swelling  ROS Reviewed:   ROS reviewed  Active Problems  Problems    1  Back pain (724 5) (M54 9)   2  Benign essential hypertension (401 1) (I10)   3   Coronary artery disease (414 00) (I25 10)   4  History of Malignant melanoma of back (172 5) (C43 59)   5  Obesity (278 00) (E66 9)    Past Medical History  Problems    1  History of Abnormal stress test (794 39) (R94 39)   2  History of Arthritis (V13 4)   3  History of Chest pain (786 50) (R07 9)   4  History of Edema, lower extremity (782 3) (R60 0)   5  History of Malignant melanoma of back (172 5) (C43 59)   6  History of Screening for prostate cancer (V76 44) (Z12 5)  Active Problems And Past Medical History Reviewed: The active problems and past medical history were reviewed and updated today  Surgical History  Problems    1  History of Excision Of Lesion Trunk Malignant   2  History of [place] Lymph Node Biopsy   3  History of Transcath Placement Of Intrathoracic Carotid Artery Stent  Surgical History Reviewed: The surgical history was reviewed and updated today  Family History  Mother    1  Family history of ALS (amyotrophic lateral sclerosis)  Father    2  Family history of Carcinoma Of The Pancreas   3  Family history of Colon Cancer (V16 0)  Family History Reviewed: The family history was reviewed and updated today  Social History  Problems    · Denied: History of Alcohol Use (History)   · Former smoker (V15 82) (Z44 021)   ·    · History of Never A Smoker  Social History Reviewed: The social history was reviewed and updated today  Current Meds   1  Aspirin 81 MG Oral Tablet; Take 2 tablets daily; Therapy: (Recorded:22Jan2016) to Recorded   2  Atorvastatin Calcium 40 MG Oral Tablet; TAKE 1 TABLET DAILY AT BEDTIME; Therapy: 17EKW9452 to (Last Rx:20Oct2015)  Requested for: 20Oct2015 Ordered   3  Clopidogrel Bisulfate 75 MG Oral Tablet; TAKE 1 TABLET DAILY; Therapy: 42USE8538 to (Irais Zambrano)  Requested for: 68IQK8263; Last   Rx:02Mar2015 Ordered   4  Lisinopril-Hydrochlorothiazide 10-12 5 MG Oral Tablet; One daily;    Therapy: 49KNH6947 to (Evaluate:14Oct2016)  Requested for: 36IQX7326; Last   Rx:20Oct2015 Ordered   5  Metoprolol Succinate ER 25 MG Oral Tablet Extended Release 24 Hour; Take 1 tablet   daily; Therapy: 60IYN3342 to (Ricka Nyhan)  Requested for: 43JVB5097; Last   Rx:02Mar2015 Ordered   6  Nitrostat 0 4 MG Sublingual Tablet Sublingual; DISSOLVE 1 TABLET UNDER THE   TONGUE AS NEEDED FOR CHEST PAIN;   Therapy: 68SUS5725 to (Evaluate:49Uiz1712)  Requested for: 76TZB2254; Last   SZ:61SZN1270 Ordered  Medication List Reviewed: The medication list was reviewed and updated today  Allergies  Medication    1  No Known Drug Allergies  Non-Medication    2  Seasonal    Vitals  Vital Signs [Data Includes: Current Encounter]    Recorded: 14HXV2893 09:13AM   Heart Rate 61   Systolic 635   Diastolic 80   Height 5 ft 10 in   Weight 268 lb 3 04 oz   BMI Calculated 38 48   BSA Calculated 2 37   O2 Saturation 97     Physical Exam    Constitutional   General appearance: No acute distress, well appearing and well nourished  Eyes   Conjunctiva and Sclera examination: Conjunctiva pink, sclera anicteric  Ears, Nose, Mouth, and Throat - Oropharynx: Clear, nares are clear, mucous membranes are moist    Neck   Neck and thyroid: Normal, supple, trachea midline, no thyromegaly  Pulmonary   Respiratory effort: No increased work of breathing or signs of respiratory distress  Auscultation of lungs: Clear to auscultation, no rales, no rhonchi, no wheezing, good air movement  Cardiovascular   Auscultation of heart: Normal rate and rhythm, normal S1 and S2, no murmurs  Carotid pulses: Normal, 2+ bilaterally  Peripheral vascular exam: Normal pulses throughout, no tenderness, erythema or swelling  Pedal pulses: Normal, 2+ bilaterally  Examination of extremities for edema and/or varicosities: Normal     Abdomen   Abdomen: Non-tender and no distention  Liver and spleen: No hepatomegaly or splenomegaly  Musculoskeletal Gait and station: Normal gait   Digits and nails: Normal without clubbing or cyanosis  Inspection/palpation of joints, bones, and muscles: Normal, ROM normal     Skin - Skin and subcutaneous tissue: Normal without rashes or lesions  Skin is warm and well perfused, normal turgor  Neurologic - Speech: Normal     Psychiatric - Orientation to person, place, and time: Normal  Mood and affect: Normal       Results/Data  Diagnostic Studies Reviewed Cardio: I personally reviewed the recording/images in the office today  My interpretation follows  Results   (1) LIPID PANEL FASTING W DIRECT LDL REFLEX [id number] 11:08AM [facility] Order Number: [id number]     Test Name Result Flag Reference   TRIGLYCERIDES 121 mg/dL     TRIGLYCERIDE:  Normal              <150 mg/dl  Borderline High    150-199 mg/dl  High               200-499 mg/dl  Very High          >499 mg/dl  _______________________________________   CHOLESTEROL 107 mg/dL     CHOLESTEROL:  Desirable        <200 mg/dl  Borderline High  200-239 mg/dl  High             >239 mg/dl  ____________________________________   HDL,DIRECT 36 mg/dL     HDL:  High       >59 mg/dl  Low        <41 mg/dl  ______________________________   LDL CHOLESTEROL CALCULATED 47 mg/dL  0-100   LDL, CALCULATED:  This screening LDL is a calculated result  It does not have the accuracy of the Direct Measured LDL in the  monitoring of patients with hyperlipidemia and/or statin therapy    Direct Measured LDL (Test Code 1163) must be ordered separately in these  patients   ______________________________     (1) HEPATIC FUNCTION PANEL 47Bzx5889 11:08AM [facility] Order Number: [id number]     Test Name Result Flag Reference   BILI, TOTAL 1 3 mg/dL H 0 2-1 0   TOTAL PROTEIN 7 7 g/dL  6 4-8 2   ALK PHOSPHATAS 81 U/L     ALT (SGPT) 14 U/L  14-59   New Reference Range   AST(SGOT) 34 U/L  10-42   BILI, DIRECT 0 3 mg/dL  0 0-0 3   ALBUMIN 4 0 g/dL  3 5-5 0     Future Appointments    Date/Time Provider Specialty Site   09/13/2016 09:30 AM Melita Titus Janyth Schirmer, M D   Surgical Oncology [facility]     Signatures   Electronically signed by : BRY Carroll ; Jan 22 2016  9:33AM EST                       (Author)

## 2018-02-16 ENCOUNTER — OFFICE VISIT (OUTPATIENT)
Dept: CARDIOLOGY CLINIC | Facility: CLINIC | Age: 68
End: 2018-02-16
Payer: MEDICARE

## 2018-02-16 ENCOUNTER — APPOINTMENT (OUTPATIENT)
Dept: LAB | Facility: CLINIC | Age: 68
End: 2018-02-16
Payer: MEDICARE

## 2018-02-16 VITALS
HEART RATE: 73 BPM | DIASTOLIC BLOOD PRESSURE: 84 MMHG | SYSTOLIC BLOOD PRESSURE: 120 MMHG | WEIGHT: 273 LBS | BODY MASS INDEX: 39.08 KG/M2 | HEIGHT: 70 IN

## 2018-02-16 DIAGNOSIS — I25.10 CAD (CORONARY ARTERY DISEASE): ICD-10-CM

## 2018-02-16 DIAGNOSIS — I25.10 CAD (CORONARY ARTERY DISEASE): Primary | ICD-10-CM

## 2018-02-16 LAB
ALBUMIN SERPL BCP-MCNC: 4 G/DL (ref 3.5–5)
ALP SERPL-CCNC: 68 U/L (ref 46–116)
ALT SERPL W P-5'-P-CCNC: 20 U/L (ref 12–78)
ANION GAP SERPL CALCULATED.3IONS-SCNC: 6 MMOL/L (ref 4–13)
AST SERPL W P-5'-P-CCNC: 15 U/L (ref 5–45)
BILIRUB SERPL-MCNC: 1.3 MG/DL (ref 0.2–1)
BUN SERPL-MCNC: 22 MG/DL (ref 5–25)
CALCIUM SERPL-MCNC: 9.5 MG/DL (ref 8.3–10.1)
CHLORIDE SERPL-SCNC: 104 MMOL/L (ref 100–108)
CHOLEST SERPL-MCNC: 111 MG/DL (ref 50–200)
CO2 SERPL-SCNC: 28 MMOL/L (ref 21–32)
CREAT SERPL-MCNC: 0.98 MG/DL (ref 0.6–1.3)
GFR SERPL CREATININE-BSD FRML MDRD: 78 ML/MIN/1.73SQ M
GLUCOSE P FAST SERPL-MCNC: 83 MG/DL (ref 65–99)
HDLC SERPL-MCNC: 42 MG/DL (ref 40–60)
LDLC SERPL CALC-MCNC: 48 MG/DL (ref 0–100)
POTASSIUM SERPL-SCNC: 4.5 MMOL/L (ref 3.5–5.3)
PROT SERPL-MCNC: 7.6 G/DL (ref 6.4–8.2)
SODIUM SERPL-SCNC: 138 MMOL/L (ref 136–145)
TRIGL SERPL-MCNC: 105 MG/DL

## 2018-02-16 PROCEDURE — 36415 COLL VENOUS BLD VENIPUNCTURE: CPT

## 2018-02-16 PROCEDURE — 80053 COMPREHEN METABOLIC PANEL: CPT

## 2018-02-16 PROCEDURE — 99214 OFFICE O/P EST MOD 30 MIN: CPT | Performed by: INTERNAL MEDICINE

## 2018-02-16 PROCEDURE — 80061 LIPID PANEL: CPT

## 2018-02-16 NOTE — PROGRESS NOTES
Cardiology Follow Up    Pardeep Cardona  1950  [id number]  [address] [place] [id number]    No diagnosis found  Interval History: Followup for CAD    He is feeling well  No chest pain, no dyspnea, no palpitations  Doing well with medical therapy  Past Medical History:   Diagnosis Date    Hyperlipidemia     Hypertension     Melanoma ([address] )     back, removed    Stented coronary artery      Social History     Social History    Marital status: /Civil Union     Spouse name: N/A    Number of children: N/A    Years of education: N/A     Occupational History    Not on file  Social History Main Topics    Smoking status: Former Smoker    Smokeless tobacco: Former User    Alcohol use No    Drug use: No    Sexual activity: Not on file     Other Topics Concern    Not on file     Social History Narrative    No narrative on file      No family history on file  No past surgical history on file  Current Outpatient Prescriptions:     aspirin 1 mg/mL SUSP, Take 162 mg by mouth , Disp: , Rfl:     atorvastatin (LIPITOR) 40 mg tablet, Atorvastatin Calcium 40 MG Oral Tablet TAKE 1 TABLET DAILY AT BEDTIME    Quantity: 90;  Refills: 3    Hakan MOSQUEDA ;  Started 30-Jan-2015 Active, Disp: , Rfl:     lisinopril-hydrochlorothiazide (PRINZIDE,ZESTORETIC) 10-12 5 MG per tablet, Lisinopril-Hydrochlorothiazide 10-12 5 MG Oral Tablet One daily  Quantity: 90;  Refills: 3    Hakan MOSQUEDA ;  Started 2-Mar-2015 Active, Disp: , Rfl:     metoprolol succinate (TOPROL-XL) 25 mg 24 hr tablet, Metoprolol Succinate ER 25 MG Oral Tablet Extended Release 24 Hour Take 1 tablet daily  Quantity: 90;  Refills: 3    Hakan MOSQUEDA ;  Started 30-Jan-2015 Active, Disp: , Rfl:   No Known Allergies    Labs:     Chemistry        Component Value Date/Time     02/27/2017 0930     03/07/2015 1123    K 4 4 02/27/2017 0930    K 4 6 03/07/2015 1123     02/27/2017 0930     03/07/2015 1123    CO2 30 02/27/2017 0930    CO2 31 03/07/2015 1123    BUN 24 02/27/2017 0930    BUN 22 03/07/2015 1123    CREATININE 0 99 02/27/2017 0930    CREATININE 0 92 03/07/2015 1123        Component Value Date/Time    CALCIUM 9 0 02/27/2017 0930    CALCIUM 9 5 03/07/2015 1123    ALKPHOS 76 02/27/2017 0930    ALKPHOS 81 07/13/2015 1108    AST 15 02/27/2017 0930    AST 34 07/13/2015 1108    ALT 30 02/27/2017 0930    ALT 14 07/13/2015 1108    BILITOT 0 80 02/27/2017 0930    BILITOT 1 3 (H) 07/13/2015 1108            Lab Results   Component Value Date    CHOL 107 02/27/2017    CHOL 98 07/25/2016    CHOL 107 07/13/2015     Lab Results   Component Value Date    HDL 44 02/27/2017    HDL 39 (L) 07/25/2016    HDL 36 07/13/2015     Lab Results   Component Value Date    LDLCALC 36 02/27/2017    LDLCALC 35 07/25/2016    LDLCALC 47 07/13/2015     Lab Results   Component Value Date    TRIG 137 02/27/2017    TRIG 122 07/25/2016    TRIG 121 07/13/2015     No components found for: CHOLHDL    Imaging: No results found  Review of Systems:  Review of Systems - The 12 point review of systems is Negative except for what is listed in the HPI  Vitals:    02/16/18 0941   BP: 120/84   Pulse: 73       Physical Exam:  Physical Examination:   General appearance - alert, well appearing, and in no distress  HEENT:  PERRLA, EOMI, no scleral icterus, no conjunctival pallor  NECK:  Supple, No elevated JVP, no thyromegaly, no carotid bruits  HEART:  Regular rate and rhythm, normal S1/S2, no S3/S4, no murmur or rub  LUNGS:  Clear to auscultation bilaterally  ABDOMEN:  Soft, non-tender, positive bowel sounds, no rebound or guarding  EXTREMITIES:  No edema  VASCULAR:  Normal pedal pulses       Discussion/Summary:    1  Coronary Artery Disease: Overall stable  Continue medical therapy  Will check lipid panel       2  HTN: BP well controlled on current medical therapy  The patient was counseled regarding diagnostic results, instructions for management, risk factor reductions, impressions  total time of encounter was 25 minutes and 15 minutes was spent counseling

## 2018-03-05 DIAGNOSIS — I25.10 CAD (CORONARY ARTERY DISEASE): Primary | ICD-10-CM

## 2018-03-05 RX ORDER — ATORVASTATIN CALCIUM 40 MG/1
TABLET, FILM COATED ORAL
Qty: 90 TABLET | Refills: 3 | Status: SHIPPED | OUTPATIENT
Start: 2018-03-05 | End: 2019-08-31 | Stop reason: SDUPTHER

## 2018-03-28 DIAGNOSIS — I25.10 CORONARY ARTERY DISEASE INVOLVING NATIVE CORONARY ARTERY OF NATIVE HEART WITHOUT ANGINA PECTORIS: Primary | ICD-10-CM

## 2018-03-29 RX ORDER — METOPROLOL SUCCINATE 25 MG/1
TABLET, EXTENDED RELEASE ORAL
Qty: 90 TABLET | Refills: 0 | Status: SHIPPED | OUTPATIENT
Start: 2018-03-29 | End: 2018-04-02 | Stop reason: SDUPTHER

## 2018-04-02 DIAGNOSIS — I25.10 CORONARY ARTERY DISEASE INVOLVING NATIVE CORONARY ARTERY OF NATIVE HEART WITHOUT ANGINA PECTORIS: ICD-10-CM

## 2018-04-02 DIAGNOSIS — I10 HYPERTENSION, UNSPECIFIED TYPE: Primary | ICD-10-CM

## 2018-04-02 RX ORDER — METOPROLOL SUCCINATE 25 MG/1
25 TABLET, EXTENDED RELEASE ORAL DAILY
Qty: 90 TABLET | Refills: 0 | Status: SHIPPED | OUTPATIENT
Start: 2018-04-02 | End: 2018-06-26 | Stop reason: SDUPTHER

## 2018-04-02 RX ORDER — METOPROLOL SUCCINATE 25 MG/1
1 TABLET, EXTENDED RELEASE ORAL DAILY
COMMUNITY
Start: 2015-01-30 | End: 2018-08-23 | Stop reason: SDUPTHER

## 2018-04-02 RX ORDER — LISINOPRIL AND HYDROCHLOROTHIAZIDE 12.5; 1 MG/1; MG/1
1 TABLET ORAL DAILY
Qty: 30 TABLET | Refills: 11 | Status: SHIPPED | OUTPATIENT
Start: 2018-04-02 | End: 2019-08-31 | Stop reason: SDUPTHER

## 2018-06-26 DIAGNOSIS — I25.10 CORONARY ARTERY DISEASE INVOLVING NATIVE CORONARY ARTERY OF NATIVE HEART WITHOUT ANGINA PECTORIS: ICD-10-CM

## 2018-06-26 RX ORDER — METOPROLOL SUCCINATE 25 MG/1
25 TABLET, EXTENDED RELEASE ORAL DAILY
Qty: 90 TABLET | Refills: 2 | Status: SHIPPED | OUTPATIENT
Start: 2018-06-26 | End: 2019-03-21 | Stop reason: SDUPTHER

## 2018-08-23 RX ORDER — NITROGLYCERIN 0.4 MG/1
1 TABLET SUBLINGUAL
COMMUNITY
Start: 2015-01-26

## 2018-08-24 ENCOUNTER — OFFICE VISIT (OUTPATIENT)
Dept: CARDIOLOGY CLINIC | Facility: CLINIC | Age: 68
End: 2018-08-24
Payer: MEDICARE

## 2018-08-24 VITALS
WEIGHT: 286.6 LBS | BODY MASS INDEX: 41.03 KG/M2 | HEIGHT: 70 IN | HEART RATE: 68 BPM | DIASTOLIC BLOOD PRESSURE: 70 MMHG | SYSTOLIC BLOOD PRESSURE: 132 MMHG

## 2018-08-24 DIAGNOSIS — I25.10 CORONARY ARTERY DISEASE INVOLVING NATIVE CORONARY ARTERY OF NATIVE HEART WITHOUT ANGINA PECTORIS: Primary | ICD-10-CM

## 2018-08-24 PROCEDURE — 99214 OFFICE O/P EST MOD 30 MIN: CPT | Performed by: INTERNAL MEDICINE

## 2018-08-24 PROCEDURE — 93000 ELECTROCARDIOGRAM COMPLETE: CPT | Performed by: INTERNAL MEDICINE

## 2018-08-24 NOTE — PROGRESS NOTES
Cardiology Follow Up    Harrel Bernheim  1950  [id number]  [address] [facility] [address] [place] [address]  [phone]    1  Coronary artery disease involving native coronary artery of native heart without angina pectoris  POCT ECG       Interval History: Followup for CAd    Doing well  No chest pain, no dyspnea, no palpitations  Doing well with medical therapy  Past Medical History:   Diagnosis Date    Hyperlipidemia     Hypertension     Melanoma ([address] )     back, removed    Stented coronary artery      Social History     Social History    Marital status: /Civil Union     Spouse name: N/A    Number of children: N/A    Years of education: N/A     Occupational History    Not on file  Social History Main Topics    Smoking status: Former Smoker    Smokeless tobacco: Former User    Alcohol use No    Drug use: No    Sexual activity: Not on file     Other Topics Concern    Not on file     Social History Narrative    No narrative on file      No family history on file  No past surgical history on file      Current Outpatient Prescriptions:     aspirin 1 mg/mL SUSP, Take 162 mg by mouth , Disp: , Rfl:     atorvastatin (LIPITOR) 40 mg tablet, Take one tablet daily at bedtime, Disp: 90 tablet, Rfl: 3    lisinopril-hydrochlorothiazide (PRINZIDE,ZESTORETIC) 10-12 5 MG per tablet, Take 1 tablet by mouth daily Lisinopril-Hydrochlorothiazide 10-12 5 MG Oral Tablet One daily  Quantity: 90;  Refills: 3    Marcella MOSQUEDA ;  Started 2-Mar-2015 Active, Disp: 30 tablet, Rfl: 11    metoprolol succinate (TOPROL-XL) 25 mg 24 hr tablet, Take 1 tablet (25 mg total) by mouth daily, Disp: 90 tablet, Rfl: 2    nitroglycerin (NITROSTAT) 0 4 mg SL tablet, Place 1 tablet under the tongue, Disp: , Rfl:   Allergies   Allergen Reactions    Seasonal Ic  [Cholestatin]        Labs:     Chemistry        Component Value Date/Time     02/16/2018 1012     03/07/2015 1123    K 4 5 02/16/2018 1012    K 4 6 03/07/2015 1123     02/16/2018 1012     03/07/2015 1123    CO2 28 02/16/2018 1012    CO2 31 03/07/2015 1123    BUN 22 02/16/2018 1012    BUN 22 03/07/2015 1123    CREATININE 0 98 02/16/2018 1012    CREATININE 0 92 03/07/2015 1123        Component Value Date/Time    CALCIUM 9 5 02/16/2018 1012    CALCIUM 9 5 03/07/2015 1123    ALKPHOS 68 02/16/2018 1012    ALKPHOS 81 07/13/2015 1108    AST 15 02/16/2018 1012    AST 34 07/13/2015 1108    ALT 20 02/16/2018 1012    ALT 14 07/13/2015 1108    BILITOT 1 30 (H) 02/16/2018 1012    BILITOT 1 3 (H) 07/13/2015 1108            Lab Results   Component Value Date    CHOL 107 07/13/2015    CHOL 105 02/05/2015     Lab Results   Component Value Date    HDL 42 02/16/2018    HDL 44 02/27/2017    HDL 39 (L) 07/25/2016     Lab Results   Component Value Date    LDLCALC 48 02/16/2018    LDLCALC 36 02/27/2017    LDLCALC 35 07/25/2016     Lab Results   Component Value Date    TRIG 105 02/16/2018    TRIG 137 02/27/2017    TRIG 122 07/25/2016     No components found for: CHOLHDL    Imaging: No results found  EKG:NSR  Normal ECG  Review of Systems   Constitution: Negative  HENT: Negative  Eyes: Negative  Cardiovascular: Negative  Respiratory: Negative  Endocrine: Negative  Hematologic/Lymphatic: Negative  Skin: Negative  Musculoskeletal: Negative  Gastrointestinal: Negative  Genitourinary: Negative  Neurological: Negative  Psychiatric/Behavioral: Negative  Allergic/Immunologic: Negative  Vitals:    08/24/18 1108   BP: 132/70   Pulse: 68           Physical Exam   Constitutional: He is oriented to person, place, and time  No distress  HENT:   Mouth/Throat: No oropharyngeal exudate  Eyes: No scleral icterus  Neck: No JVD present  Cardiovascular: Normal rate and regular rhythm  No murmur heard    Pulmonary/Chest: Effort normal and breath sounds normal  No respiratory distress  He has no wheezes  He has no rales  Abdominal: Soft  Bowel sounds are normal  He exhibits no distension  There is no tenderness  There is no rebound  Musculoskeletal: He exhibits no edema  Neurological: He is alert and oriented to person, place, and time  Skin: Skin is warm and dry  He is not diaphoretic  Psychiatric: He has a normal mood and affect  His behavior is normal        Discussion/Summary:    1  Coronary Artery Disease: Overall stable  Continue medical therapy  LDL was 48      2  HTN: BP well controlled on current medical therapy  The patient was counseled regarding diagnostic results, instructions for management, risk factor reductions, impressions  total time of encounter was 25 minutes and 15 minutes was spent counseling

## 2018-12-04 ENCOUNTER — TELEPHONE (OUTPATIENT)
Dept: CARDIOLOGY CLINIC | Facility: CLINIC | Age: 68
End: 2018-12-04

## 2018-12-04 NOTE — TELEPHONE ENCOUNTER
Pt dropped off CDL paperwork for Dr Lexie Hedrick to fill out  I emailed paperwork to Mandeep Meyers  I did inform pt Dr Lexie Hedrick will be in office on Thursday we will give it to the doctor then but it may not be completed then  We will contact pt when paperwork is completed

## 2018-12-17 ENCOUNTER — OFFICE VISIT (OUTPATIENT)
Dept: PAIN MEDICINE | Facility: CLINIC | Age: 68
End: 2018-12-17
Payer: MEDICARE

## 2018-12-17 VITALS
DIASTOLIC BLOOD PRESSURE: 52 MMHG | SYSTOLIC BLOOD PRESSURE: 110 MMHG | WEIGHT: 266.2 LBS | HEIGHT: 70 IN | BODY MASS INDEX: 38.11 KG/M2 | RESPIRATION RATE: 18 BRPM | HEART RATE: 67 BPM

## 2018-12-17 DIAGNOSIS — M54.50 CHRONIC BILATERAL LOW BACK PAIN WITHOUT SCIATICA: ICD-10-CM

## 2018-12-17 DIAGNOSIS — G89.4 CHRONIC PAIN SYNDROME: Primary | ICD-10-CM

## 2018-12-17 DIAGNOSIS — G89.29 CHRONIC BILATERAL LOW BACK PAIN WITHOUT SCIATICA: ICD-10-CM

## 2018-12-17 DIAGNOSIS — M47.816 LUMBAR SPONDYLOSIS: ICD-10-CM

## 2018-12-17 PROCEDURE — 99214 OFFICE O/P EST MOD 30 MIN: CPT | Performed by: ANESTHESIOLOGY

## 2018-12-17 NOTE — LETTER
December 17, 2018     Wade Cobb DO  [address] [facility]  [facility] [address]    Patient: Brent Salguero   YOB: 1950   Date of Visit: 12/17/2018       Dear Dr Penney Frankel:    Thank you for referring Brent Salguero to me for evaluation  Below are my notes for this consultation  If you have questions, please do not hesitate to call me  I look forward to following your patient along with you  Sincerely,        Ramon Woods MD        CC: No Recipients  Ramon Woods MD  12/17/2018  3:34 PM  Sign at close encounter  Pain Medicine Follow-Up Note    Assessment:  1  Chronic pain syndrome    2  Chronic bilateral low back pain without sciatica    3  Lumbar spondylosis        Plan:  My impressions and treatment recommendations were discussed in detail with the patient who verbalized understanding and had no further questions  Given that the patient has signs and symptoms consistent with bilateral low back pain secondary to lumbar spondylosis, I discussed the rationale of undergoing a bilateral L3, L4, L5, and S1 diagnostic and confirmatory medial branch block  The procedures, its risks, and benefits were explained in detail to the patient  Risks include but are not limited to bleeding, infection, hematoma formation, abscess formation, weakness, headache, failure the pain to improve, nerve irritation or damage, and potential worsening of the pain  The patient verbalized understanding and wished to proceed with the procedure  If the patient does respond favorably to the diagnostic and confirmatory medial branch blocks, I will proceed to a radiofrequency ablation  If he does not respond favorably, he may undergo epidural steroid injections or a new MRI of the lumbar spine to evaluate for any other pathology that may be contributing to his pain complaints  Follow-up is planned in 4 weeks time or sooner as warranted  Discharge instructions were provided   I personally saw and examined the patient and I agree with the above discussed plan of care  History of Present Illness:    Carlos Montanez is a 77 y o  male who presents to [facility] for interval re-evaluation of the above stated pain complaints  The patient has a past medical and chronic pain history as outlined in the assessment section  He was last seen on May 20, 2016  At today's office visit, the patient's pain score is 4 to 5/10 on the verbal numerical pain rating scale  The patient states that his pain is primarily in his low back  He states that his pain is worse in the morning, evening, and night  His pain is intermittent in nature and he reports the quality of his pain as dull/aching, sharp, shooting, and numbness    He reports excellent pain relief following the epidural steroid injection that he had in 2016  He states that his pain has been more debilitating recently  He is describing pain that is radiating from his low back into his right buttocks region  Other than as stated above, the patient denies any interval changes in medications, medical condition, mental condition, symptoms, or allergies since the last office visit  Review of Systems:    Review of Systems   Respiratory: Negative for shortness of breath  Cardiovascular: Negative for chest pain  Gastrointestinal: Positive for constipation  Negative for diarrhea, nausea and vomiting  Musculoskeletal: Positive for gait problem (difficulty walking/ decreased range of motion) and joint swelling (joint stiffness)  Negative for arthralgias and myalgias  Skin: Negative for rash  Neurological: Positive for weakness (muscle weakness)  Negative for dizziness and seizures  All other systems reviewed and are negative          Patient Active Problem List   Diagnosis    Chronic pain syndrome    Chronic bilateral low back pain without sciatica    Lumbar spondylosis       Past Medical History:   Diagnosis Date    Hyperlipidemia  Hypertension     [address]anoma ([facility] [address] )     back, removed    Stented coronary artery        History reviewed  No pertinent surgical history  History reviewed  No pertinent family history  Social History     Occupational History    Not on file  Social History Main Topics    Smoking status: Former Smoker    Smokeless tobacco: Former User    Alcohol use No    Drug use: No    Sexual activity: Not on file         Current Outpatient Prescriptions:     aspirin 1 mg/mL SUSP, Take 162 mg by mouth , Disp: , Rfl:     atorvastatin (LIPITOR) 40 mg tablet, Take one tablet daily at bedtime, Disp: 90 tablet, Rfl: 3    lisinopril-hydrochlorothiazide (PRINZIDE,ZESTORETIC) 10-12 5 MG per tablet, Take 1 tablet by mouth daily Lisinopril-Hydrochlorothiazide 10-12 5 MG Oral Tablet One daily  Quantity: 90;  Refills: 3    Marcus MOSQUEDA ;  Started 2-Mar-2015 Active, Disp: 30 tablet, Rfl: 11    metoprolol succinate (TOPROL-XL) 25 mg 24 hr tablet, Take 1 tablet (25 mg total) by mouth daily, Disp: 90 tablet, Rfl: 2    nitroglycerin (NITROSTAT) 0 4 mg SL tablet, Place 1 tablet under the tongue, Disp: , Rfl:     Allergies   Allergen Reactions    Seasonal Ic  [Cholestatin]        Physical Exam:    /52 (BP Location: Right arm, Patient Position: Sitting, Cuff Size: Standard)   Pulse 67   Resp 18   Ht 5' 10" (1 778 m)   Wt 121 kg (266 lb 3 2 oz)   BMI 38 20 kg/m²      Constitutional:normal, well developed, well nourished, alert, in no distress and non-toxic and no overt pain behavior  Eyes:anicteric  HEENT:grossly intact  Neck:supple, symmetric, trachea midline and no masses   Pulmonary:even and unlabored  Cardiovascular:No edema or pitting edema present  Skin:Normal without rashes or lesions and well hydrated  Psychiatric:Mood and affect appropriate  Neurologic:Cranial Nerves II-XII grossly intact  Musculoskeletal:normal, lumbar facet loading is positive bilaterally    There is no sacroiliac joint tenderness  Imaging  No orders to display     PACS Images     Show images for MRI lumbar spine wo contrast   Order Report      Order Details   Reason For Exam     Dx: Adolescent idiopathic scoliosis of lumbar region [T66 180 (ICD-10-CM)]   Study Result     MRI LUMBAR SPINE WITHOUT CONTRAST     INDICATION:  Severe low back pain with bilateral lower extremity radiculopathy      COMPARISON:  None      TECHNIQUE:  Sagittal T1, sagittal T2, sagittal inversion recovery, axial T1 and axial T2, coronal T2        IMAGE QUALITY:  Diagnostic     FINDINGS:     ALIGNMENT:  Mild dextroscoliosis of the thoracolumbar spine  No compression fracture  No spondylolisthesis or spondylolysis      MARROW SIGNAL:  Mild endplate marrow degenerative change  No focal marrow edema      DISTAL CORD AND CONUS:  Normal size and signal within the distal cord and conus  The conus ends at the L1-L2 level      PARASPINAL SOFT TISSUES:  Paraspinal soft tissues are unremarkable      SACRUM:  Normal signal within the sacrum  No evidence of insufficiency or stress fracture      LOWER THORACIC DISC SPACES:  Mild lower thoracic degenerative disc disease without disc herniation, canal stenosis or foraminal narrowing      LUMBAR DISC SPACES:          L1-L2:  Disc desiccation and loss of disc height  Moderate diffuse annular bulging with broad-based left foraminal and lateral disc protrusion  Mild to moderate canal stenosis with moderately severe left foraminal narrowing      L2-L3:  Moderate diffuse annular bulging  Small broad-based central disc herniation and small broad-based left foraminal disc herniation  Severe canal stenosis with moderately severe left and mild right foraminal narrowing      L3-L4:  Mild diffuse annular bulging  Mild facet degenerative change  Mild canal stenosis without foraminal narrowing      L4-L5:  Moderate diffuse annular bulging with broad-based right foraminal and far lateral disc protrusion    Mild facet arthropathy  Moderate canal stenosis and right foraminal narrowing      L5-S1:  Moderate diffuse annular bulging  Broad-based right foraminal and lateral disc protrusion with endplate osteophyte formation  Mild canal stenosis  Moderately severe right foraminal narrowing      IMPRESSION:     Multilevel diffuse annular bulging with broad-based predominantly foraminal and lateral disc protrusions  Endplate and facet hypertrophic degenerative change noted        Central disc protrusion at L2-3 superimposed upon annular bulging and degenerative disc disease  There is also a left foraminal disc protrusion at this level  Severe canal stenosisl  There is moderately severe left foraminal narrowing at this level      Severe left foraminal narrowing at L1-2 and right greater than left foraminal narrowing at L4-5 and L5-S1      Spine surgery consultation recommended         Workstation performed: [id number]      Imaging     MRI lumbar spine wo contrast (Order #[id number]) on 5/6/2016 - Imaging Information   Result History     MRI lumbar spine wo contrast (Order #[id number]) on 5/6/2016 - Order Result History Report    Show result history   Result Comparison   MRI lumbar spine wo contrast (Order [id number])   Newer Version Older Version   Final result    5/6/2016  3:14 PM    Interface, Radiology Results In         This is the newest version No older versions exist   Narrative     MRI LUMBAR SPINE WITHOUT CONTRAST     INDICATION:  Severe low back pain with bilateral lower extremity radiculopathy  COMPARISON:  None  TECHNIQUE:  Sagittal T1, sagittal T2, sagittal inversion recovery, axial T1 and axial T2, coronal T2        IMAGE QUALITY:  Diagnostic     FINDINGS:     ALIGNMENT:  Mild dextroscoliosis of the thoracolumbar spine   No compression fracture   No spondylolisthesis or spondylolysis  MARROW SIGNAL:  Mild endplate marrow degenerative change   No focal marrow edema       DISTAL CORD AND CONUS:  Normal size and signal within the distal cord and conus   The conus ends at the L1-L2 level  PARASPINAL SOFT TISSUES:  Paraspinal soft tissues are unremarkable  SACRUM:  Normal signal within the sacrum  No evidence of insufficiency or stress fracture  LOWER THORACIC DISC SPACES:  Mild lower thoracic degenerative disc disease without disc herniation, canal stenosis or foraminal narrowing  LUMBAR DISC SPACES:          L1-L2:  Disc desiccation and loss of disc height   Moderate diffuse annular bulging with broad-based left foraminal and lateral disc protrusion   Mild to moderate canal stenosis with moderately severe left foraminal narrowing  L2-L3:  Moderate diffuse annular bulging   Small broad-based central disc herniation and small broad-based left foraminal disc herniation   Severe canal stenosis with moderately severe left and mild right foraminal narrowing  L3-L4:  Mild diffuse annular bulging   Mild facet degenerative change   Mild canal stenosis without foraminal narrowing  L4-L5:  Moderate diffuse annular bulging with broad-based right foraminal and far lateral disc protrusion   Mild facet arthropathy   Moderate canal stenosis and right foraminal narrowing  L5-S1:  Moderate diffuse annular bulging   Broad-based right foraminal and lateral disc protrusion with endplate osteophyte formation   Mild canal stenosis   Moderately severe right foraminal narrowing  Impression       Multilevel diffuse annular bulging with broad-based predominantly foraminal and lateral disc protrusions   Endplate and facet hypertrophic degenerative change noted        Central disc protrusion at L2-3 superimposed upon annular bulging and degenerative disc disease   There is also a left foraminal disc protrusion at this level   Severe canal stenosisl   There is moderately severe left foraminal narrowing at this level       Severe left foraminal narrowing at L1-2 and right greater than left foraminal narrowing at L4-5 and L5-S1  Spine surgery consultation recommended

## 2018-12-17 NOTE — PROGRESS NOTES
Pain Medicine Follow-Up Note    Assessment:  1  Chronic pain syndrome    2  Chronic bilateral low back pain without sciatica    3  Lumbar spondylosis        Plan:  My impressions and treatment recommendations were discussed in detail with the patient who verbalized understanding and had no further questions  Given that the patient has signs and symptoms consistent with bilateral low back pain secondary to lumbar spondylosis, I discussed the rationale of undergoing a bilateral L3, L4, L5, and S1 diagnostic and confirmatory medial branch block  The procedures, its risks, and benefits were explained in detail to the patient  Risks include but are not limited to bleeding, infection, hematoma formation, abscess formation, weakness, headache, failure the pain to improve, nerve irritation or damage, and potential worsening of the pain  The patient verbalized understanding and wished to proceed with the procedure  If the patient does respond favorably to the diagnostic and confirmatory medial branch blocks, I will proceed to a radiofrequency ablation  If he does not respond favorably, he may undergo epidural steroid injections or a new MRI of the lumbar spine to evaluate for any other pathology that may be contributing to his pain complaints  Follow-up is planned in 4 weeks time or sooner as warranted  Discharge instructions were provided  I personally saw and examined the patient and I agree with the above discussed plan of care  History of Present Illness:    Wiliam Santo is a 77 y o  male who presents to [facility] for interval re-evaluation of the above stated pain complaints  The patient has a past medical and chronic pain history as outlined in the assessment section  He was last seen on May 20, 2016  At today's office visit, the patient's pain score is 4 to 5/10 on the verbal numerical pain rating scale  The patient states that his pain is primarily in his low back  He states that his pain is worse in the morning, evening, and night  His pain is intermittent in nature and he reports the quality of his pain as dull/aching, sharp, shooting, and numbness    He reports excellent pain relief following the epidural steroid injection that he had in 2016  He states that his pain has been more debilitating recently  He is describing pain that is radiating from his low back into his right buttocks region  Other than as stated above, the patient denies any interval changes in medications, medical condition, mental condition, symptoms, or allergies since the last office visit  Review of Systems:    Review of Systems   Respiratory: Negative for shortness of breath  Cardiovascular: Negative for chest pain  Gastrointestinal: Positive for constipation  Negative for diarrhea, nausea and vomiting  Musculoskeletal: Positive for gait problem (difficulty walking/ decreased range of motion) and joint swelling (joint stiffness)  Negative for arthralgias and myalgias  Skin: Negative for rash  Neurological: Positive for weakness (muscle weakness)  Negative for dizziness and seizures  All other systems reviewed and are negative  Patient Active Problem List   Diagnosis    Chronic pain syndrome    Chronic bilateral low back pain without sciatica    Lumbar spondylosis       Past Medical History:   Diagnosis Date    Hyperlipidemia     Hypertension     Melanoma ([facility] [address] )     back, removed    Stented coronary artery        History reviewed  No pertinent surgical history  History reviewed  No pertinent family history  Social History     Occupational History    Not on file       Social History Main Topics    Smoking status: Former Smoker    Smokeless tobacco: Former User    Alcohol use No    Drug use: No    Sexual activity: Not on file         Current Outpatient Prescriptions:     aspirin 1 mg/mL SUSP, Take 162 mg by mouth , Disp: , Rfl:     atorvastatin (LIPITOR) 40 mg tablet, Take one tablet daily at bedtime, Disp: 90 tablet, Rfl: 3    lisinopril-hydrochlorothiazide (PRINZIDE,ZESTORETIC) 10-12 5 MG per tablet, Take 1 tablet by mouth daily Lisinopril-Hydrochlorothiazide 10-12 5 MG Oral Tablet One daily  Quantity: 90;  Refills: 3    Karla Davalos M D ;  Started 2-Mar-2015 Active, Disp: 30 tablet, Rfl: 11    metoprolol succinate (TOPROL-XL) 25 mg 24 hr tablet, Take 1 tablet (25 mg total) by mouth daily, Disp: 90 tablet, Rfl: 2    nitroglycerin (NITROSTAT) 0 4 mg SL tablet, Place 1 tablet under the tongue, Disp: , Rfl:     Allergies   Allergen Reactions    Seasonal Ic  [Cholestatin]        Physical Exam:    /52 (BP Location: Right arm, Patient Position: Sitting, Cuff Size: Standard)   Pulse 67   Resp 18   Ht 5' 10" (1 778 m)   Wt 121 kg (266 lb 3 2 oz)   BMI 38 20 kg/m²     Constitutional:normal, well developed, well nourished, alert, in no distress and non-toxic and no overt pain behavior  Eyes:anicteric  HEENT:grossly intact  Neck:supple, symmetric, trachea midline and no masses   Pulmonary:even and unlabored  Cardiovascular:No edema or pitting edema present  Skin:Normal without rashes or lesions and well hydrated  Psychiatric:Mood and affect appropriate  Neurologic:Cranial Nerves II-XII grossly intact  Musculoskeletal:normal, lumbar facet loading is positive bilaterally  There is no sacroiliac joint tenderness        Imaging  No orders to display     PACS Images     Show images for MRI lumbar spine wo contrast   Order Report      Order Details   Reason For Exam     Dx: Adolescent idiopathic scoliosis of lumbar region [U91 341 (ICD-10-CM)]   Study Result     MRI LUMBAR SPINE WITHOUT CONTRAST     INDICATION:  Severe low back pain with bilateral lower extremity radiculopathy      COMPARISON:  None      TECHNIQUE:  Sagittal T1, sagittal T2, sagittal inversion recovery, axial T1 and axial T2, coronal T2        IMAGE QUALITY: Diagnostic     FINDINGS:     ALIGNMENT:  Mild dextroscoliosis of the thoracolumbar spine  No compression fracture  No spondylolisthesis or spondylolysis      MARROW SIGNAL:  Mild endplate marrow degenerative change  No focal marrow edema      DISTAL CORD AND CONUS:  Normal size and signal within the distal cord and conus  The conus ends at the L1-L2 level      PARASPINAL SOFT TISSUES:  Paraspinal soft tissues are unremarkable      SACRUM:  Normal signal within the sacrum  No evidence of insufficiency or stress fracture      LOWER THORACIC DISC SPACES:  Mild lower thoracic degenerative disc disease without disc herniation, canal stenosis or foraminal narrowing      LUMBAR DISC SPACES:          L1-L2:  Disc desiccation and loss of disc height  Moderate diffuse annular bulging with broad-based left foraminal and lateral disc protrusion  Mild to moderate canal stenosis with moderately severe left foraminal narrowing      L2-L3:  Moderate diffuse annular bulging  Small broad-based central disc herniation and small broad-based left foraminal disc herniation  Severe canal stenosis with moderately severe left and mild right foraminal narrowing      L3-L4:  Mild diffuse annular bulging  Mild facet degenerative change  Mild canal stenosis without foraminal narrowing      L4-L5:  Moderate diffuse annular bulging with broad-based right foraminal and far lateral disc protrusion  Mild facet arthropathy  Moderate canal stenosis and right foraminal narrowing      L5-S1:  Moderate diffuse annular bulging  Broad-based right foraminal and lateral disc protrusion with endplate osteophyte formation  Mild canal stenosis  Moderately severe right foraminal narrowing      IMPRESSION:     Multilevel diffuse annular bulging with broad-based predominantly foraminal and lateral disc protrusions    Endplate and facet hypertrophic degenerative change noted        Central disc protrusion at L2-3 superimposed upon annular bulging and degenerative disc disease  There is also a left foraminal disc protrusion at this level  Severe canal stenosisl  There is moderately severe left foraminal narrowing at this level      Severe left foraminal narrowing at L1-2 and right greater than left foraminal narrowing at L4-5 and L5-S1      Spine surgery consultation recommended         Workstation performed: [id number]      Imaging     MRI lumbar spine wo contrast (Order #[id number]) on 5/6/2016 - Imaging Information   Result History     MRI lumbar spine wo contrast (Order #[id number]) on 5/6/2016 - Order Result History Report    Show result history   Result Comparison   MRI lumbar spine wo contrast (Order [id number])   Newer Version Older Version   Final result    5/6/2016  3:14 PM    Interface, Radiology Results In         This is the newest version No older versions exist   Narrative     MRI LUMBAR SPINE WITHOUT CONTRAST     INDICATION:  Severe low back pain with bilateral lower extremity radiculopathy  COMPARISON:  None  TECHNIQUE:  Sagittal T1, sagittal T2, sagittal inversion recovery, axial T1 and axial T2, coronal T2        IMAGE QUALITY:  Diagnostic     FINDINGS:     ALIGNMENT:  Mild dextroscoliosis of the thoracolumbar spine   No compression fracture   No spondylolisthesis or spondylolysis  MARROW SIGNAL:  Mild endplate marrow degenerative change   No focal marrow edema  DISTAL CORD AND CONUS:  Normal size and signal within the distal cord and conus   The conus ends at the L1-L2 level  PARASPINAL SOFT TISSUES:  Paraspinal soft tissues are unremarkable  SACRUM:  Normal signal within the sacrum  No evidence of insufficiency or stress fracture  LOWER THORACIC DISC SPACES:  Mild lower thoracic degenerative disc disease without disc herniation, canal stenosis or foraminal narrowing       LUMBAR DISC SPACES:          L1-L2:  Disc desiccation and loss of disc height   Moderate diffuse annular bulging with broad-based left foraminal and lateral disc protrusion   Mild to moderate canal stenosis with moderately severe left foraminal narrowing  L2-L3:  Moderate diffuse annular bulging   Small broad-based central disc herniation and small broad-based left foraminal disc herniation   Severe canal stenosis with moderately severe left and mild right foraminal narrowing  L3-L4:  Mild diffuse annular bulging   Mild facet degenerative change   Mild canal stenosis without foraminal narrowing  L4-L5:  Moderate diffuse annular bulging with broad-based right foraminal and far lateral disc protrusion   Mild facet arthropathy   Moderate canal stenosis and right foraminal narrowing  L5-S1:  Moderate diffuse annular bulging   Broad-based right foraminal and lateral disc protrusion with endplate osteophyte formation   Mild canal stenosis   Moderately severe right foraminal narrowing  Impression       Multilevel diffuse annular bulging with broad-based predominantly foraminal and lateral disc protrusions   Endplate and facet hypertrophic degenerative change noted        Central disc protrusion at L2-3 superimposed upon annular bulging and degenerative disc disease   There is also a left foraminal disc protrusion at this level   Severe canal stenosisl   There is moderately severe left foraminal narrowing at this level  Severe left foraminal narrowing at L1-2 and right greater than left foraminal narrowing at L4-5 and L5-S1  Spine surgery consultation recommended

## 2018-12-21 ENCOUNTER — HOSPITAL ENCOUNTER (OUTPATIENT)
Dept: RADIOLOGY | Facility: HOSPITAL | Age: 68
Setting detail: OUTPATIENT SURGERY
Discharge: HOME | End: 2018-12-21
Payer: MEDICARE

## 2018-12-21 ENCOUNTER — HOSPITAL ENCOUNTER (OUTPATIENT)
Facility: AMBULARY SURGERY CENTER | Age: 68
Setting detail: OUTPATIENT SURGERY
Discharge: HOME | End: 2018-12-21
Attending: ANESTHESIOLOGY | Admitting: ANESTHESIOLOGY
Payer: MEDICARE

## 2018-12-21 VITALS
HEART RATE: 58 BPM | SYSTOLIC BLOOD PRESSURE: 143 MMHG | DIASTOLIC BLOOD PRESSURE: 71 MMHG | OXYGEN SATURATION: 98 % | RESPIRATION RATE: 18 BRPM | TEMPERATURE: 98.6 F

## 2018-12-21 PROCEDURE — 64495 INJ PARAVERT F JNT L/S 3 LEV: CPT | Performed by: ANESTHESIOLOGY

## 2018-12-21 PROCEDURE — 64493 INJ PARAVERT F JNT L/S 1 LEV: CPT | Performed by: ANESTHESIOLOGY

## 2018-12-21 PROCEDURE — 72020 X-RAY EXAM OF SPINE 1 VIEW: CPT

## 2018-12-21 PROCEDURE — 64494 INJ PARAVERT F JNT L/S 2 LEV: CPT | Performed by: ANESTHESIOLOGY

## 2018-12-21 RX ORDER — LIDOCAINE WITH 8.4% SOD BICARB 0.9%(10ML)
SYRINGE (ML) INJECTION AS NEEDED
Status: DISCONTINUED | OUTPATIENT
Start: 2018-12-21 | End: 2018-12-21 | Stop reason: HOSPADM

## 2018-12-21 RX ORDER — LIDOCAINE HYDROCHLORIDE 10 MG/ML
INJECTION, SOLUTION EPIDURAL; INFILTRATION; INTRACAUDAL; PERINEURAL AS NEEDED
Status: DISCONTINUED | OUTPATIENT
Start: 2018-12-21 | End: 2018-12-21 | Stop reason: HOSPADM

## 2018-12-21 NOTE — OP NOTE
ATTENDING PHYSICIAN:  Mariely Stauffer MD     PRE-PROCEDURE DIAGNOSIS:  Lumbar facet arthropathy  POST-PROCEDURE DIAGNOSIS:  Lumbar facet arthropathy  PROCEDURE:  Bilateral lumbar diagnostic medial branch blocks at the L3, L4, L5, and S1 levels  ESTIMATED BLOOD LOSS:  Minimal     COMPLICATIONS:  None  ANESTHESIA:  Local     LOCATION:  [address]  CONSENT: Today's procedure and its risks, benefits, as well as alternatives were explained to the patient  Risks include but are not limited to bleeding, infection, weakness, nerve irritation or damage, hematoma formation, abscess formation, failure the pain to improve, or potential worsening of the pain  The patient verbalized understanding and wished to proceed with the procedure  Written informed consent was thereby obtained  DESCRIPTION OF PROCEDURE: After written informed consent was obtained, the patient was taken to the fluoroscopy suite and placed in the prone position  Anatomical landmarks were identified with the aid of fluoroscopy in the PA and oblique views  The patient's lumbar region was prepped with antiseptic solution and draped in the usual sterile fashion  Strict aseptic technique was utilized  The skin and subcutaneous tissues at each needle entry site was infiltrated with a small amount of 1% preservative-free lidocaine using a 25-gauge 1-1/2-inch needle  A 25-gauge 3-1/2-inch needle was then incrementally advanced under fluoroscopic guidance in multiple views at each level such that the needle tip was advanced to contact os at the junction of the superior articulating process and the superomedial border of the transverse process at each of the cephalad levels as well as to contact os at the junction of the superior articulating process and the superomedial border of the sacral ala at the S1 level   After negative aspiration was confirmed, 0 5 mL of preservative-free 1% Lidocaine was injected into the cephalad needles and 1 mL of preservative-free 1% Lidocaine was injected into the needles at the S1 level  All needles were removed intact and hemostasis was maintained throughout  The patient tolerated the procedure well and no paresthesias or other complications were reported during or following this procedure  The antiseptic was wiped clean and Band-Aids were placed as appropriate  The patient was transferred to the recovery area and was observed for an appropriate period of time during which the patient remained hemodynamically stable and neurovascularly intact as the patient was prior to the procedure  The patient was subsequently discharged home in stable condition with supervision  The patient was instructed to call the office in a few days with an update  Discharge instructions were provided  I was present for and participated in all key and critical portions of this procedure      Chidi Call MD  12/21/2018  12:24 PM

## 2018-12-21 NOTE — DISCHARGE INSTRUCTIONS

## 2018-12-21 NOTE — H&P (VIEW-ONLY)
Pain Medicine Follow-Up Note    Assessment:  1  Chronic pain syndrome    2  Chronic bilateral low back pain without sciatica    3  Lumbar spondylosis        Plan:  My impressions and treatment recommendations were discussed in detail with the patient who verbalized understanding and had no further questions  Given that the patient has signs and symptoms consistent with bilateral low back pain secondary to lumbar spondylosis, I discussed the rationale of undergoing a bilateral L3, L4, L5, and S1 diagnostic and confirmatory medial branch block  The procedures, its risks, and benefits were explained in detail to the patient  Risks include but are not limited to bleeding, infection, hematoma formation, abscess formation, weakness, headache, failure the pain to improve, nerve irritation or damage, and potential worsening of the pain  The patient verbalized understanding and wished to proceed with the procedure  If the patient does respond favorably to the diagnostic and confirmatory medial branch blocks, I will proceed to a radiofrequency ablation  If he does not respond favorably, he may undergo epidural steroid injections or a new MRI of the lumbar spine to evaluate for any other pathology that may be contributing to his pain complaints  Follow-up is planned in 4 weeks time or sooner as warranted  Discharge instructions were provided  I personally saw and examined the patient and I agree with the above discussed plan of care  History of Present Illness:    Simon Esteban is a 77 y o  male who presents to [facility] for interval re-evaluation of the above stated pain complaints  The patient has a past medical and chronic pain history as outlined in the assessment section  He was last seen on May 20, 2016  At today's office visit, the patient's pain score is 4 to 5/10 on the verbal numerical pain rating scale  The patient states that his pain is primarily in his low back  He states that his pain is worse in the morning, evening, and night  His pain is intermittent in nature and he reports the quality of his pain as dull/aching, sharp, shooting, and numbness    He reports excellent pain relief following the epidural steroid injection that he had in 2016  He states that his pain has been more debilitating recently  He is describing pain that is radiating from his low back into his right buttocks region  Other than as stated above, the patient denies any interval changes in medications, medical condition, mental condition, symptoms, or allergies since the last office visit  Review of Systems:    Review of Systems   Respiratory: Negative for shortness of breath  Cardiovascular: Negative for chest pain  Gastrointestinal: Positive for constipation  Negative for diarrhea, nausea and vomiting  Musculoskeletal: Positive for gait problem (difficulty walking/ decreased range of motion) and joint swelling (joint stiffness)  Negative for arthralgias and myalgias  Skin: Negative for rash  Neurological: Positive for weakness (muscle weakness)  Negative for dizziness and seizures  All other systems reviewed and are negative  Patient Active Problem List   Diagnosis    Chronic pain syndrome    Chronic bilateral low back pain without sciatica    Lumbar spondylosis       Past Medical History:   Diagnosis Date    Hyperlipidemia     Hypertension     Melanoma ([facility] [address] )     back, removed    Stented coronary artery        History reviewed  No pertinent surgical history  History reviewed  No pertinent family history  Social History     Occupational History    Not on file       Social History Main Topics    Smoking status: Former Smoker    Smokeless tobacco: Former User    Alcohol use No    Drug use: No    Sexual activity: Not on file         Current Outpatient Prescriptions:     aspirin 1 mg/mL SUSP, Take 162 mg by mouth , Disp: , Rfl:     atorvastatin (LIPITOR) 40 mg tablet, Take one tablet daily at bedtime, Disp: 90 tablet, Rfl: 3    lisinopril-hydrochlorothiazide (PRINZIDE,ZESTORETIC) 10-12 5 MG per tablet, Take 1 tablet by mouth daily Lisinopril-Hydrochlorothiazide 10-12 5 MG Oral Tablet One daily  Quantity: 90;  Refills: 3    Sharyon Space M D ;  Started 2-Mar-2015 Active, Disp: 30 tablet, Rfl: 11    metoprolol succinate (TOPROL-XL) 25 mg 24 hr tablet, Take 1 tablet (25 mg total) by mouth daily, Disp: 90 tablet, Rfl: 2    nitroglycerin (NITROSTAT) 0 4 mg SL tablet, Place 1 tablet under the tongue, Disp: , Rfl:     Allergies   Allergen Reactions    Seasonal Ic  [Cholestatin]        Physical Exam:    /52 (BP Location: Right arm, Patient Position: Sitting, Cuff Size: Standard)   Pulse 67   Resp 18   Ht 5' 10" (1 778 m)   Wt 121 kg (266 lb 3 2 oz)   BMI 38 20 kg/m²     Constitutional:normal, well developed, well nourished, alert, in no distress and non-toxic and no overt pain behavior  Eyes:anicteric  HEENT:grossly intact  Neck:supple, symmetric, trachea midline and no masses   Pulmonary:even and unlabored  Cardiovascular:No edema or pitting edema present  Skin:Normal without rashes or lesions and well hydrated  Psychiatric:Mood and affect appropriate  Neurologic:Cranial Nerves II-XII grossly intact  Musculoskeletal:normal, lumbar facet loading is positive bilaterally  There is no sacroiliac joint tenderness        Imaging  No orders to display     PACS Images     Show images for MRI lumbar spine wo contrast   Order Report      Order Details   Reason For Exam     Dx: Adolescent idiopathic scoliosis of lumbar region [Y78 671 (ICD-10-CM)]   Study Result     MRI LUMBAR SPINE WITHOUT CONTRAST     INDICATION:  Severe low back pain with bilateral lower extremity radiculopathy      COMPARISON:  None      TECHNIQUE:  Sagittal T1, sagittal T2, sagittal inversion recovery, axial T1 and axial T2, coronal T2        IMAGE QUALITY: Diagnostic     FINDINGS:     ALIGNMENT:  Mild dextroscoliosis of the thoracolumbar spine  No compression fracture  No spondylolisthesis or spondylolysis      MARROW SIGNAL:  Mild endplate marrow degenerative change  No focal marrow edema      DISTAL CORD AND CONUS:  Normal size and signal within the distal cord and conus  The conus ends at the L1-L2 level      PARASPINAL SOFT TISSUES:  Paraspinal soft tissues are unremarkable      SACRUM:  Normal signal within the sacrum  No evidence of insufficiency or stress fracture      LOWER THORACIC DISC SPACES:  Mild lower thoracic degenerative disc disease without disc herniation, canal stenosis or foraminal narrowing      LUMBAR DISC SPACES:          L1-L2:  Disc desiccation and loss of disc height  Moderate diffuse annular bulging with broad-based left foraminal and lateral disc protrusion  Mild to moderate canal stenosis with moderately severe left foraminal narrowing      L2-L3:  Moderate diffuse annular bulging  Small broad-based central disc herniation and small broad-based left foraminal disc herniation  Severe canal stenosis with moderately severe left and mild right foraminal narrowing      L3-L4:  Mild diffuse annular bulging  Mild facet degenerative change  Mild canal stenosis without foraminal narrowing      L4-L5:  Moderate diffuse annular bulging with broad-based right foraminal and far lateral disc protrusion  Mild facet arthropathy  Moderate canal stenosis and right foraminal narrowing      L5-S1:  Moderate diffuse annular bulging  Broad-based right foraminal and lateral disc protrusion with endplate osteophyte formation  Mild canal stenosis  Moderately severe right foraminal narrowing      IMPRESSION:     Multilevel diffuse annular bulging with broad-based predominantly foraminal and lateral disc protrusions    Endplate and facet hypertrophic degenerative change noted        Central disc protrusion at L2-3 superimposed upon annular bulging and degenerative disc disease  There is also a left foraminal disc protrusion at this level  Severe canal stenosisl  There is moderately severe left foraminal narrowing at this level      Severe left foraminal narrowing at L1-2 and right greater than left foraminal narrowing at L4-5 and L5-S1      Spine surgery consultation recommended         Workstation performed: [id number]      Imaging     MRI lumbar spine wo contrast (Order #[id number]) on 5/6/2016 - Imaging Information   Result History     MRI lumbar spine wo contrast (Order #[id number]) on 5/6/2016 - Order Result History Report    Show result history   Result Comparison   MRI lumbar spine wo contrast (Order [id number])   Newer Version Older Version   Final result    5/6/2016  3:14 PM    Interface, Radiology Results In         This is the newest version No older versions exist   Narrative     MRI LUMBAR SPINE WITHOUT CONTRAST     INDICATION:  Severe low back pain with bilateral lower extremity radiculopathy  COMPARISON:  None  TECHNIQUE:  Sagittal T1, sagittal T2, sagittal inversion recovery, axial T1 and axial T2, coronal T2        IMAGE QUALITY:  Diagnostic     FINDINGS:     ALIGNMENT:  Mild dextroscoliosis of the thoracolumbar spine   No compression fracture   No spondylolisthesis or spondylolysis  MARROW SIGNAL:  Mild endplate marrow degenerative change   No focal marrow edema  DISTAL CORD AND CONUS:  Normal size and signal within the distal cord and conus   The conus ends at the L1-L2 level  PARASPINAL SOFT TISSUES:  Paraspinal soft tissues are unremarkable  SACRUM:  Normal signal within the sacrum  No evidence of insufficiency or stress fracture  LOWER THORACIC DISC SPACES:  Mild lower thoracic degenerative disc disease without disc herniation, canal stenosis or foraminal narrowing       LUMBAR DISC SPACES:          L1-L2:  Disc desiccation and loss of disc height   Moderate diffuse annular bulging with broad-based left foraminal and lateral disc protrusion   Mild to moderate canal stenosis with moderately severe left foraminal narrowing  L2-L3:  Moderate diffuse annular bulging   Small broad-based central disc herniation and small broad-based left foraminal disc herniation   Severe canal stenosis with moderately severe left and mild right foraminal narrowing  L3-L4:  Mild diffuse annular bulging   Mild facet degenerative change   Mild canal stenosis without foraminal narrowing  L4-L5:  Moderate diffuse annular bulging with broad-based right foraminal and far lateral disc protrusion   Mild facet arthropathy   Moderate canal stenosis and right foraminal narrowing  L5-S1:  Moderate diffuse annular bulging   Broad-based right foraminal and lateral disc protrusion with endplate osteophyte formation   Mild canal stenosis   Moderately severe right foraminal narrowing  Impression       Multilevel diffuse annular bulging with broad-based predominantly foraminal and lateral disc protrusions   Endplate and facet hypertrophic degenerative change noted        Central disc protrusion at L2-3 superimposed upon annular bulging and degenerative disc disease   There is also a left foraminal disc protrusion at this level   Severe canal stenosisl   There is moderately severe left foraminal narrowing at this level  Severe left foraminal narrowing at L1-2 and right greater than left foraminal narrowing at L4-5 and L5-S1  Spine surgery consultation recommended

## 2018-12-24 ENCOUNTER — TELEPHONE (OUTPATIENT)
Dept: PAIN MEDICINE | Facility: CLINIC | Age: 68
End: 2018-12-24

## 2018-12-26 ENCOUNTER — TELEPHONE (OUTPATIENT)
Dept: PAIN MEDICINE | Facility: CLINIC | Age: 68
End: 2018-12-26

## 2018-12-26 NOTE — TELEPHONE ENCOUNTER
Sched pt for B/L L3-S1 MBB#2 for 12/28/18  Went over pre-procedure instructions below:  No as needed pain meds for 6 hrs prior to and 6/8 hrs after procedure    Pain level must be 5 or greater  Nothing to eat or drink 1 hr prior to procedure  Need to arrange transportation  Proper clothing for procedure   If ill or placed on antibiotics please call to reschedule

## 2018-12-28 ENCOUNTER — HOSPITAL ENCOUNTER (OUTPATIENT)
Facility: AMBULARY SURGERY CENTER | Age: 68
Setting detail: OUTPATIENT SURGERY
Discharge: HOME | End: 2018-12-28
Attending: ANESTHESIOLOGY | Admitting: ANESTHESIOLOGY
Payer: MEDICARE

## 2018-12-28 ENCOUNTER — HOSPITAL ENCOUNTER (OUTPATIENT)
Dept: RADIOLOGY | Facility: HOSPITAL | Age: 68
Setting detail: OUTPATIENT SURGERY
Discharge: HOME | End: 2018-12-28
Payer: MEDICARE

## 2018-12-28 VITALS
HEART RATE: 65 BPM | DIASTOLIC BLOOD PRESSURE: 72 MMHG | RESPIRATION RATE: 18 BRPM | SYSTOLIC BLOOD PRESSURE: 127 MMHG | TEMPERATURE: 97.6 F | OXYGEN SATURATION: 96 %

## 2018-12-28 PROCEDURE — 64493 INJ PARAVERT F JNT L/S 1 LEV: CPT | Performed by: ANESTHESIOLOGY

## 2018-12-28 PROCEDURE — 64495 INJ PARAVERT F JNT L/S 3 LEV: CPT | Performed by: ANESTHESIOLOGY

## 2018-12-28 PROCEDURE — 64494 INJ PARAVERT F JNT L/S 2 LEV: CPT | Performed by: ANESTHESIOLOGY

## 2018-12-28 PROCEDURE — 72020 X-RAY EXAM OF SPINE 1 VIEW: CPT

## 2018-12-28 RX ORDER — BUPIVACAINE HYDROCHLORIDE 2.5 MG/ML
INJECTION, SOLUTION EPIDURAL; INFILTRATION; INTRACAUDAL AS NEEDED
Status: DISCONTINUED | OUTPATIENT
Start: 2018-12-28 | End: 2018-12-28 | Stop reason: HOSPADM

## 2018-12-28 RX ORDER — LIDOCAINE WITH 8.4% SOD BICARB 0.9%(10ML)
SYRINGE (ML) INJECTION AS NEEDED
Status: DISCONTINUED | OUTPATIENT
Start: 2018-12-28 | End: 2018-12-28 | Stop reason: HOSPADM

## 2018-12-28 NOTE — OP NOTE
ATTENDING PHYSICIAN:  Ramon Woods MD     PRE-PROCEDURE DIAGNOSIS:  Lumbar facet arthropathy  POST-PROCEDURE DIAGNOSIS:  Lumbar facet arthropathy  PROCEDURE:  Bilateral lumbar diagnostic medial branch blocks at the L3, L4, L5, and S1 levels  ESTIMATED BLOOD LOSS:  Minimal     COMPLICATIONS:  None  ANESTHESIA:  Local     LOCATION:  [facility]  CONSENT: Today's procedure and its risks, benefits, as well as alternatives were explained to the patient  Risks include but are not limited to bleeding, infection, weakness, nerve irritation or damage, hematoma formation, abscess formation, failure the pain to improve, or potential worsening of the pain  The patient verbalized understanding and wished to proceed with the procedure  Written informed consent was thereby obtained  DESCRIPTION OF PROCEDURE: After written informed consent was obtained, the patient was taken to the fluoroscopy suite and placed in the prone position  Anatomical landmarks were identified with the aid of fluoroscopy in the PA and oblique views  The patient's lumbar region was prepped with antiseptic solution and draped in the usual sterile fashion  Strict aseptic technique was utilized  The skin and subcutaneous tissues at each needle entry site was infiltrated with a small amount of 1% preservative-free lidocaine using a 25-gauge 1-1/2-inch needle  A 25-gauge 3-1/2-inch needle was then incrementally advanced under fluoroscopic guidance in multiple views at each level such that the needle tip was advanced to contact os at the junction of the superior articulating process and the superomedial border of the transverse process at each of the cephalad levels as well as to contact os at the junction of the superior articulating process and the superomedial border of the sacral ala at the S1 level   After negative aspiration was confirmed, 0 5 mL of preservative-free 0 25% Bupivicaine was injected into the cephalad needles and 1 mL of preservative-free 0 25% Bupivicaine was injected into the needles at the S1 level  All needles were removed intact and hemostasis was maintained throughout  The patient tolerated the procedure well and no paresthesias or other complications were reported during or following this procedure  The antiseptic was wiped clean and Band-Aids were placed as appropriate  The patient was transferred to the recovery area and was observed for an appropriate period of time during which the patient remained hemodynamically stable and neurovascularly intact as the patient was prior to the procedure  The patient was subsequently discharged home in stable condition with supervision  The patient was instructed to call the office in a few days with an update  Discharge instructions were provided  I was present for and participated in all key and critical portions of this procedure      Mukund Sanchez MD  12/28/2018  11:01 AM

## 2018-12-28 NOTE — DISCHARGE INSTRUCTIONS

## 2018-12-28 NOTE — H&P (VIEW-ONLY)
Pain Medicine Follow-Up Note    Assessment:  1  Chronic pain syndrome    2  Chronic bilateral low back pain without sciatica    3  Lumbar spondylosis        Plan:  My impressions and treatment recommendations were discussed in detail with the patient who verbalized understanding and had no further questions  Given that the patient has signs and symptoms consistent with bilateral low back pain secondary to lumbar spondylosis, I discussed the rationale of undergoing a bilateral L3, L4, L5, and S1 diagnostic and confirmatory medial branch block  The procedures, its risks, and benefits were explained in detail to the patient  Risks include but are not limited to bleeding, infection, hematoma formation, abscess formation, weakness, headache, failure the pain to improve, nerve irritation or damage, and potential worsening of the pain  The patient verbalized understanding and wished to proceed with the procedure  If the patient does respond favorably to the diagnostic and confirmatory medial branch blocks, I will proceed to a radiofrequency ablation  If he does not respond favorably, he may undergo epidural steroid injections or a new MRI of the lumbar spine to evaluate for any other pathology that may be contributing to his pain complaints  Follow-up is planned in 4 weeks time or sooner as warranted  Discharge instructions were provided  I personally saw and examined the patient and I agree with the above discussed plan of care  History of Present Illness:    Korina Ramirez is a 77 y o  male who presents to [facility] for interval re-evaluation of the above stated pain complaints  The patient has a past medical and chronic pain history as outlined in the assessment section  He was last seen on May 20, 2016  At today's office visit, the patient's pain score is 4 to 5/10 on the verbal numerical pain rating scale  The patient states that his pain is primarily in his low back  He states that his pain is worse in the morning, evening, and night  His pain is intermittent in nature and he reports the quality of his pain as dull/aching, sharp, shooting, and numbness    He reports excellent pain relief following the epidural steroid injection that he had in 2016  He states that his pain has been more debilitating recently  He is describing pain that is radiating from his low back into his right buttocks region  Other than as stated above, the patient denies any interval changes in medications, medical condition, mental condition, symptoms, or allergies since the last office visit  Review of Systems:    Review of Systems   Respiratory: Negative for shortness of breath  Cardiovascular: Negative for chest pain  Gastrointestinal: Positive for constipation  Negative for diarrhea, nausea and vomiting  Musculoskeletal: Positive for gait problem (difficulty walking/ decreased range of motion) and joint swelling (joint stiffness)  Negative for arthralgias and myalgias  Skin: Negative for rash  Neurological: Positive for weakness (muscle weakness)  Negative for dizziness and seizures  All other systems reviewed and are negative  Patient Active Problem List   Diagnosis    Chronic pain syndrome    Chronic bilateral low back pain without sciatica    Lumbar spondylosis       Past Medical History:   Diagnosis Date    Hyperlipidemia     Hypertension     Melanoma ([facility] [address] )     back, removed    Stented coronary artery        History reviewed  No pertinent surgical history  History reviewed  No pertinent family history  Social History     Occupational History    Not on file       Social History Main Topics    Smoking status: Former Smoker    Smokeless tobacco: Former User    Alcohol use No    Drug use: No    Sexual activity: Not on file         Current Outpatient Prescriptions:     aspirin 1 mg/mL SUSP, Take 162 mg by mouth , Disp: , Rfl:     atorvastatin (LIPITOR) 40 mg tablet, Take one tablet daily at bedtime, Disp: 90 tablet, Rfl: 3    lisinopril-hydrochlorothiazide (PRINZIDE,ZESTORETIC) 10-12 5 MG per tablet, Take 1 tablet by mouth daily Lisinopril-Hydrochlorothiazide 10-12 5 MG Oral Tablet One daily  Quantity: 90;  Refills: 3    Manjinder Sagar M D ;  Started 2-Mar-2015 Active, Disp: 30 tablet, Rfl: 11    metoprolol succinate (TOPROL-XL) 25 mg 24 hr tablet, Take 1 tablet (25 mg total) by mouth daily, Disp: 90 tablet, Rfl: 2    nitroglycerin (NITROSTAT) 0 4 mg SL tablet, Place 1 tablet under the tongue, Disp: , Rfl:     Allergies   Allergen Reactions    Seasonal Ic  [Cholestatin]        Physical Exam:    /52 (BP Location: Right arm, Patient Position: Sitting, Cuff Size: Standard)   Pulse 67   Resp 18   Ht 5' 10" (1 778 m)   Wt 121 kg (266 lb 3 2 oz)   BMI 38 20 kg/m²     Constitutional:normal, well developed, well nourished, alert, in no distress and non-toxic and no overt pain behavior  Eyes:anicteric  HEENT:grossly intact  Neck:supple, symmetric, trachea midline and no masses   Pulmonary:even and unlabored  Cardiovascular:No edema or pitting edema present  Skin:Normal without rashes or lesions and well hydrated  Psychiatric:Mood and affect appropriate  Neurologic:Cranial Nerves II-XII grossly intact  Musculoskeletal:normal, lumbar facet loading is positive bilaterally  There is no sacroiliac joint tenderness        Imaging  No orders to display     PACS Images     Show images for MRI lumbar spine wo contrast   Order Report      Order Details   Reason For Exam     Dx: Adolescent idiopathic scoliosis of lumbar region [F22 969 (ICD-10-CM)]   Study Result     MRI LUMBAR SPINE WITHOUT CONTRAST     INDICATION:  Severe low back pain with bilateral lower extremity radiculopathy      COMPARISON:  None      TECHNIQUE:  Sagittal T1, sagittal T2, sagittal inversion recovery, axial T1 and axial T2, coronal T2        IMAGE QUALITY: Diagnostic     FINDINGS:     ALIGNMENT:  Mild dextroscoliosis of the thoracolumbar spine  No compression fracture  No spondylolisthesis or spondylolysis      MARROW SIGNAL:  Mild endplate marrow degenerative change  No focal marrow edema      DISTAL CORD AND CONUS:  Normal size and signal within the distal cord and conus  The conus ends at the L1-L2 level      PARASPINAL SOFT TISSUES:  Paraspinal soft tissues are unremarkable      SACRUM:  Normal signal within the sacrum  No evidence of insufficiency or stress fracture      LOWER THORACIC DISC SPACES:  Mild lower thoracic degenerative disc disease without disc herniation, canal stenosis or foraminal narrowing      LUMBAR DISC SPACES:          L1-L2:  Disc desiccation and loss of disc height  Moderate diffuse annular bulging with broad-based left foraminal and lateral disc protrusion  Mild to moderate canal stenosis with moderately severe left foraminal narrowing      L2-L3:  Moderate diffuse annular bulging  Small broad-based central disc herniation and small broad-based left foraminal disc herniation  Severe canal stenosis with moderately severe left and mild right foraminal narrowing      L3-L4:  Mild diffuse annular bulging  Mild facet degenerative change  Mild canal stenosis without foraminal narrowing      L4-L5:  Moderate diffuse annular bulging with broad-based right foraminal and far lateral disc protrusion  Mild facet arthropathy  Moderate canal stenosis and right foraminal narrowing      L5-S1:  Moderate diffuse annular bulging  Broad-based right foraminal and lateral disc protrusion with endplate osteophyte formation  Mild canal stenosis  Moderately severe right foraminal narrowing      IMPRESSION:     Multilevel diffuse annular bulging with broad-based predominantly foraminal and lateral disc protrusions    Endplate and facet hypertrophic degenerative change noted        Central disc protrusion at L2-3 superimposed upon annular bulging and degenerative disc disease  There is also a left foraminal disc protrusion at this level  Severe canal stenosisl  There is moderately severe left foraminal narrowing at this level      Severe left foraminal narrowing at L1-2 and right greater than left foraminal narrowing at L4-5 and L5-S1      Spine surgery consultation recommended         Workstation performed: [id number]      Imaging     MRI lumbar spine wo contrast (Order #[id number]) on 5/6/2016 - Imaging Information   Result History     MRI lumbar spine wo contrast (Order #[id number]) on 5/6/2016 - Order Result History Report    Show result history   Result Comparison   MRI lumbar spine wo contrast (Order [id number])   Newer Version Older Version   Final result    5/6/2016  3:14 PM    Interface, Radiology Results In         This is the newest version No older versions exist   Narrative     MRI LUMBAR SPINE WITHOUT CONTRAST     INDICATION:  Severe low back pain with bilateral lower extremity radiculopathy  COMPARISON:  None  TECHNIQUE:  Sagittal T1, sagittal T2, sagittal inversion recovery, axial T1 and axial T2, coronal T2        IMAGE QUALITY:  Diagnostic     FINDINGS:     ALIGNMENT:  Mild dextroscoliosis of the thoracolumbar spine   No compression fracture   No spondylolisthesis or spondylolysis  MARROW SIGNAL:  Mild endplate marrow degenerative change   No focal marrow edema  DISTAL CORD AND CONUS:  Normal size and signal within the distal cord and conus   The conus ends at the L1-L2 level  PARASPINAL SOFT TISSUES:  Paraspinal soft tissues are unremarkable  SACRUM:  Normal signal within the sacrum  No evidence of insufficiency or stress fracture  LOWER THORACIC DISC SPACES:  Mild lower thoracic degenerative disc disease without disc herniation, canal stenosis or foraminal narrowing       LUMBAR DISC SPACES:          L1-L2:  Disc desiccation and loss of disc height   Moderate diffuse annular bulging with broad-based left foraminal and lateral disc protrusion   Mild to moderate canal stenosis with moderately severe left foraminal narrowing  L2-L3:  Moderate diffuse annular bulging   Small broad-based central disc herniation and small broad-based left foraminal disc herniation   Severe canal stenosis with moderately severe left and mild right foraminal narrowing  L3-L4:  Mild diffuse annular bulging   Mild facet degenerative change   Mild canal stenosis without foraminal narrowing  L4-L5:  Moderate diffuse annular bulging with broad-based right foraminal and far lateral disc protrusion   Mild facet arthropathy   Moderate canal stenosis and right foraminal narrowing  L5-S1:  Moderate diffuse annular bulging   Broad-based right foraminal and lateral disc protrusion with endplate osteophyte formation   Mild canal stenosis   Moderately severe right foraminal narrowing  Impression       Multilevel diffuse annular bulging with broad-based predominantly foraminal and lateral disc protrusions   Endplate and facet hypertrophic degenerative change noted        Central disc protrusion at L2-3 superimposed upon annular bulging and degenerative disc disease   There is also a left foraminal disc protrusion at this level   Severe canal stenosisl   There is moderately severe left foraminal narrowing at this level  Severe left foraminal narrowing at L1-2 and right greater than left foraminal narrowing at L4-5 and L5-S1  Spine surgery consultation recommended

## 2018-12-31 ENCOUNTER — TELEPHONE (OUTPATIENT)
Dept: PAIN MEDICINE | Facility: CLINIC | Age: 68
End: 2018-12-31

## 2018-12-31 NOTE — TELEPHONE ENCOUNTER
Patient drop off pain diary at [facility] office scanned in chart and gave to Dr Axel Crocker to review

## 2019-01-02 PROCEDURE — 1124F ACP DISCUSS-NO DSCNMKR DOCD: CPT | Performed by: ANESTHESIOLOGY

## 2019-01-04 ENCOUNTER — TELEPHONE (OUTPATIENT)
Dept: PAIN MEDICINE | Facility: CLINIC | Age: 69
End: 2019-01-04

## 2019-01-04 ENCOUNTER — HOSPITAL ENCOUNTER (OUTPATIENT)
Facility: AMBULARY SURGERY CENTER | Age: 69
Setting detail: OUTPATIENT SURGERY
Discharge: HOME | End: 2019-01-04
Attending: ANESTHESIOLOGY | Admitting: ANESTHESIOLOGY
Payer: MEDICARE

## 2019-01-04 ENCOUNTER — HOSPITAL ENCOUNTER (OUTPATIENT)
Dept: RADIOLOGY | Facility: HOSPITAL | Age: 69
Setting detail: OUTPATIENT SURGERY
Discharge: HOME | End: 2019-01-04
Payer: MEDICARE

## 2019-01-04 VITALS
HEART RATE: 63 BPM | SYSTOLIC BLOOD PRESSURE: 127 MMHG | RESPIRATION RATE: 18 BRPM | TEMPERATURE: 96.1 F | OXYGEN SATURATION: 97 % | DIASTOLIC BLOOD PRESSURE: 68 MMHG

## 2019-01-04 PROCEDURE — 64636 DESTROY L/S FACET JNT ADDL: CPT | Performed by: ANESTHESIOLOGY

## 2019-01-04 PROCEDURE — 72020 X-RAY EXAM OF SPINE 1 VIEW: CPT

## 2019-01-04 PROCEDURE — 64635 DESTROY LUMB/SAC FACET JNT: CPT | Performed by: ANESTHESIOLOGY

## 2019-01-04 RX ORDER — LIDOCAINE HYDROCHLORIDE 20 MG/ML
INJECTION, SOLUTION EPIDURAL; INFILTRATION; INTRACAUDAL; PERINEURAL AS NEEDED
Status: DISCONTINUED | OUTPATIENT
Start: 2019-01-04 | End: 2019-01-04 | Stop reason: HOSPADM

## 2019-01-04 RX ORDER — LIDOCAINE WITH 8.4% SOD BICARB 0.9%(10ML)
SYRINGE (ML) INJECTION AS NEEDED
Status: DISCONTINUED | OUTPATIENT
Start: 2019-01-04 | End: 2019-01-04 | Stop reason: HOSPADM

## 2019-01-04 NOTE — H&P (VIEW-ONLY)
Pain Medicine Follow-Up Note    Assessment:  1  Chronic pain syndrome    2  Chronic bilateral low back pain without sciatica    3  Lumbar spondylosis        Plan:  My impressions and treatment recommendations were discussed in detail with the patient who verbalized understanding and had no further questions  Given that the patient has signs and symptoms consistent with bilateral low back pain secondary to lumbar spondylosis, I discussed the rationale of undergoing a bilateral L3, L4, L5, and S1 diagnostic and confirmatory medial branch block  The procedures, its risks, and benefits were explained in detail to the patient  Risks include but are not limited to bleeding, infection, hematoma formation, abscess formation, weakness, headache, failure the pain to improve, nerve irritation or damage, and potential worsening of the pain  The patient verbalized understanding and wished to proceed with the procedure  If the patient does respond favorably to the diagnostic and confirmatory medial branch blocks, I will proceed to a radiofrequency ablation  If he does not respond favorably, he may undergo epidural steroid injections or a new MRI of the lumbar spine to evaluate for any other pathology that may be contributing to his pain complaints  Follow-up is planned in 4 weeks time or sooner as warranted  Discharge instructions were provided  I personally saw and examined the patient and I agree with the above discussed plan of care  History of Present Illness:    Edgardo De La Rosa is a 77 y o  male who presents to [facility] for interval re-evaluation of the above stated pain complaints  The patient has a past medical and chronic pain history as outlined in the assessment section  He was last seen on May 20, 2016  At today's office visit, the patient's pain score is 4 to 5/10 on the verbal numerical pain rating scale  The patient states that his pain is primarily in his low back  He states that his pain is worse in the morning, evening, and night  His pain is intermittent in nature and he reports the quality of his pain as dull/aching, sharp, shooting, and numbness    He reports excellent pain relief following the epidural steroid injection that he had in 2016  He states that his pain has been more debilitating recently  He is describing pain that is radiating from his low back into his right buttocks region  Other than as stated above, the patient denies any interval changes in medications, medical condition, mental condition, symptoms, or allergies since the last office visit  Review of Systems:    Review of Systems   Respiratory: Negative for shortness of breath  Cardiovascular: Negative for chest pain  Gastrointestinal: Positive for constipation  Negative for diarrhea, nausea and vomiting  Musculoskeletal: Positive for gait problem (difficulty walking/ decreased range of motion) and joint swelling (joint stiffness)  Negative for arthralgias and myalgias  Skin: Negative for rash  Neurological: Positive for weakness (muscle weakness)  Negative for dizziness and seizures  All other systems reviewed and are negative  Patient Active Problem List   Diagnosis    Chronic pain syndrome    Chronic bilateral low back pain without sciatica    Lumbar spondylosis       Past Medical History:   Diagnosis Date    Hyperlipidemia     Hypertension     Melanoma ([facility] [address] )     back, removed    Stented coronary artery        History reviewed  No pertinent surgical history  History reviewed  No pertinent family history  Social History     Occupational History    Not on file       Social History Main Topics    Smoking status: Former Smoker    Smokeless tobacco: Former User    Alcohol use No    Drug use: No    Sexual activity: Not on file         Current Outpatient Prescriptions:     aspirin 1 mg/mL SUSP, Take 162 mg by mouth , Disp: , Rfl:     atorvastatin (LIPITOR) 40 mg tablet, Take one tablet daily at bedtime, Disp: 90 tablet, Rfl: 3    lisinopril-hydrochlorothiazide (PRINZIDE,ZESTORETIC) 10-12 5 MG per tablet, Take 1 tablet by mouth daily Lisinopril-Hydrochlorothiazide 10-12 5 MG Oral Tablet One daily  Quantity: 90;  Refills: 3    Hunter LONDONO D ;  Started 2-Mar-2015 Active, Disp: 30 tablet, Rfl: 11    metoprolol succinate (TOPROL-XL) 25 mg 24 hr tablet, Take 1 tablet (25 mg total) by mouth daily, Disp: 90 tablet, Rfl: 2    nitroglycerin (NITROSTAT) 0 4 mg SL tablet, Place 1 tablet under the tongue, Disp: , Rfl:     Allergies   Allergen Reactions    Seasonal Ic  [Cholestatin]        Physical Exam:    /52 (BP Location: Right arm, Patient Position: Sitting, Cuff Size: Standard)   Pulse 67   Resp 18   Ht 5' 10" (1 778 m)   Wt 121 kg (266 lb 3 2 oz)   BMI 38 20 kg/m²     Constitutional:normal, well developed, well nourished, alert, in no distress and non-toxic and no overt pain behavior  Eyes:anicteric  HEENT:grossly intact  Neck:supple, symmetric, trachea midline and no masses   Pulmonary:even and unlabored  Cardiovascular:No edema or pitting edema present  Skin:Normal without rashes or lesions and well hydrated  Psychiatric:Mood and affect appropriate  Neurologic:Cranial Nerves II-XII grossly intact  Musculoskeletal:normal, lumbar facet loading is positive bilaterally  There is no sacroiliac joint tenderness        Imaging  No orders to display     PACS Images     Show images for MRI lumbar spine wo contrast   Order Report      Order Details   Reason For Exam     Dx: Adolescent idiopathic scoliosis of lumbar region [E10 213 (ICD-10-CM)]   Study Result     MRI LUMBAR SPINE WITHOUT CONTRAST     INDICATION:  Severe low back pain with bilateral lower extremity radiculopathy      COMPARISON:  None      TECHNIQUE:  Sagittal T1, sagittal T2, sagittal inversion recovery, axial T1 and axial T2, coronal T2        IMAGE QUALITY: Diagnostic     FINDINGS:     ALIGNMENT:  Mild dextroscoliosis of the thoracolumbar spine  No compression fracture  No spondylolisthesis or spondylolysis      MARROW SIGNAL:  Mild endplate marrow degenerative change  No focal marrow edema      DISTAL CORD AND CONUS:  Normal size and signal within the distal cord and conus  The conus ends at the L1-L2 level      PARASPINAL SOFT TISSUES:  Paraspinal soft tissues are unremarkable      SACRUM:  Normal signal within the sacrum  No evidence of insufficiency or stress fracture      LOWER THORACIC DISC SPACES:  Mild lower thoracic degenerative disc disease without disc herniation, canal stenosis or foraminal narrowing      LUMBAR DISC SPACES:          L1-L2:  Disc desiccation and loss of disc height  Moderate diffuse annular bulging with broad-based left foraminal and lateral disc protrusion  Mild to moderate canal stenosis with moderately severe left foraminal narrowing      L2-L3:  Moderate diffuse annular bulging  Small broad-based central disc herniation and small broad-based left foraminal disc herniation  Severe canal stenosis with moderately severe left and mild right foraminal narrowing      L3-L4:  Mild diffuse annular bulging  Mild facet degenerative change  Mild canal stenosis without foraminal narrowing      L4-L5:  Moderate diffuse annular bulging with broad-based right foraminal and far lateral disc protrusion  Mild facet arthropathy  Moderate canal stenosis and right foraminal narrowing      L5-S1:  Moderate diffuse annular bulging  Broad-based right foraminal and lateral disc protrusion with endplate osteophyte formation  Mild canal stenosis  Moderately severe right foraminal narrowing      IMPRESSION:     Multilevel diffuse annular bulging with broad-based predominantly foraminal and lateral disc protrusions    Endplate and facet hypertrophic degenerative change noted        Central disc protrusion at L2-3 superimposed upon annular bulging and degenerative disc disease  There is also a left foraminal disc protrusion at this level  Severe canal stenosisl  There is moderately severe left foraminal narrowing at this level      Severe left foraminal narrowing at L1-2 and right greater than left foraminal narrowing at L4-5 and L5-S1      Spine surgery consultation recommended         Workstation performed: [id number]      Imaging     MRI lumbar spine wo contrast (Order #[id number]) on 5/6/2016 - Imaging Information   Result History     MRI lumbar spine wo contrast (Order #[id number]) on 5/6/2016 - Order Result History Report    Show result history   Result Comparison   MRI lumbar spine wo contrast (Order [id number])   Newer Version Older Version   Final result    5/6/2016  3:14 PM    Interface, Radiology Results In         This is the newest version No older versions exist   Narrative     MRI LUMBAR SPINE WITHOUT CONTRAST     INDICATION:  Severe low back pain with bilateral lower extremity radiculopathy  COMPARISON:  None  TECHNIQUE:  Sagittal T1, sagittal T2, sagittal inversion recovery, axial T1 and axial T2, coronal T2        IMAGE QUALITY:  Diagnostic     FINDINGS:     ALIGNMENT:  Mild dextroscoliosis of the thoracolumbar spine   No compression fracture   No spondylolisthesis or spondylolysis  MARROW SIGNAL:  Mild endplate marrow degenerative change   No focal marrow edema  DISTAL CORD AND CONUS:  Normal size and signal within the distal cord and conus   The conus ends at the L1-L2 level  PARASPINAL SOFT TISSUES:  Paraspinal soft tissues are unremarkable  SACRUM:  Normal signal within the sacrum  No evidence of insufficiency or stress fracture  LOWER THORACIC DISC SPACES:  Mild lower thoracic degenerative disc disease without disc herniation, canal stenosis or foraminal narrowing       LUMBAR DISC SPACES:          L1-L2:  Disc desiccation and loss of disc height   Moderate diffuse annular bulging with broad-based left foraminal and lateral disc protrusion   Mild to moderate canal stenosis with moderately severe left foraminal narrowing  L2-L3:  Moderate diffuse annular bulging   Small broad-based central disc herniation and small broad-based left foraminal disc herniation   Severe canal stenosis with moderately severe left and mild right foraminal narrowing  L3-L4:  Mild diffuse annular bulging   Mild facet degenerative change   Mild canal stenosis without foraminal narrowing  L4-L5:  Moderate diffuse annular bulging with broad-based right foraminal and far lateral disc protrusion   Mild facet arthropathy   Moderate canal stenosis and right foraminal narrowing  L5-S1:  Moderate diffuse annular bulging   Broad-based right foraminal and lateral disc protrusion with endplate osteophyte formation   Mild canal stenosis   Moderately severe right foraminal narrowing  Impression       Multilevel diffuse annular bulging with broad-based predominantly foraminal and lateral disc protrusions   Endplate and facet hypertrophic degenerative change noted        Central disc protrusion at L2-3 superimposed upon annular bulging and degenerative disc disease   There is also a left foraminal disc protrusion at this level   Severe canal stenosisl   There is moderately severe left foraminal narrowing at this level  Severe left foraminal narrowing at L1-2 and right greater than left foraminal narrowing at L4-5 and L5-S1  Spine surgery consultation recommended

## 2019-01-04 NOTE — DISCHARGE INSTRUCTIONS

## 2019-01-04 NOTE — OP NOTE
ATTENDING PHYSICIAN:   Chidi Call MD      PREPROCEDURE DIAGNOSIS:  Right lumbar facet arthropathy  POSTPROCEDURE DIAGNOSIS:  Right lumbar facet arthropathy  PROCEDURE: Right L3, L4, L5, and S1 lumbar facet nerve radiofrequency ablation under fluoroscopic guidance  ANESTHESIA:  Local     ESTIMATED BLOOD LOSS:  Minimal     COMPLICATIONS:  None  LOCATION:   [facility]  HISTORY OF PRESENTING ILLNESS:   We feel radiofrequency denervation of the L3, L4, L5, and S1 facets is medically necessary, given the patient has disabling low back pain, which we suspect is facet mediated in the absence of nerve root compression or radicular pain  The patient has had two positive confirmatory diagnostic medial branch blocks  The patient has also failed three months of conservative therapy, including nonopioid medications, manipulation, physical therapy and home exercise program   The patient has not been treated with radiofrequency denervation at this anatomic location within the past six months  CONSENT:  Today's procedure, its potential benefits as well as its risks and potential side effects were reviewed  Discussed risks of the procedure including bleeding, infection, nerve irritation or damage, reactions to the medications, failure of the pain to improve, and potential worsening of the pain were explained in detail to the patient who verbalized understanding and who wished to proceed  Written informed consent was thereby obtained  DESCRIPTION OF THE PROCEDURE: After written informed consent was obtained, the patient was taken to the fluoroscopy suite and placed in the prone position  Anatomical landmarks were identified by way of palpation with fluoroscopy in the PA and oblique views  The patient's lumbar region was then prepped and draped in the usual sterile fashion using Chlorhexidine    The skin and subcutaneous tissues were subsequently infiltrated with approximately 1 ml of 1% preservative free Lidocaine at each of the four intended needle entry sites using a 25 gauge 1-1/2 inch needle  Via fluoroscopy in the AP and oblique views, an active tip needle was then incrementally advanced under fluoroscopic guidance at each level  At each of these levels, the needle tip was made to contact os at the superior medial border of the junction of the transverse process of the lumbar levels and to contact the os at the medial aspect of the groove formed by the sacral ala in the superior articular process of S1  After proper needle placement was confirmed with fluoroscopic guidance at each level, sensory and motor stimulation was performed at 2 Hz and 50 Hz  At all levels, there was negative extension into the lower extremities  Following this portion of the procedure, a 1 ml injectate of 2% preservative free Lidocaine was instilled at all of the levels  After a period of approximately 90 seconds, each level was lesioned at 90 degrees Celsius  Following the initial lesioning, each needle tip was repositioned x 2 under fluoroscopic guidance in a clockwise and counterclockwise fashion  Following each reposition, a total of two additional lesions at each side were performed for 90 seconds at 90 degrees Celsius  All needles were removed with the tips intact  The patient tolerated the procedure and hemostasis was maintained  There were no apparent paresthesias or complications  This skin was wiped clean and a Band-Aid was placed as appropriate  The patient was monitored for an appropriate period of time following the procedure and remained hemodynamically stable and neurovascularly intact  The patient was ultimately discharged to home with supervision in good condition and instructed to call the office to report the response  I was present for and participated in all key and critical portions of this procedure      John Jc MD  1/4/2019  10:15 AM

## 2019-01-04 NOTE — TELEPHONE ENCOUNTER
**To be called on 1/7/19    Pt is s/p R L3-S1 RFA on 1/4/19 by Dr Classie Carrel  Pt coming in for left side on 1/18/19

## 2019-01-07 NOTE — TELEPHONE ENCOUNTER
SW patient, denies any soreness, redness, drainage, fever, headache or stiffness of neck  Patient states he did not use ice/heat to the area and did not have to take any over the counter pain medications  Confirmed appointment for 1/18/19

## 2019-01-18 ENCOUNTER — TELEPHONE (OUTPATIENT)
Dept: PAIN MEDICINE | Facility: CLINIC | Age: 69
End: 2019-01-18

## 2019-01-18 ENCOUNTER — HOSPITAL ENCOUNTER (OUTPATIENT)
Dept: RADIOLOGY | Facility: HOSPITAL | Age: 69
Setting detail: OUTPATIENT SURGERY
Discharge: HOME | End: 2019-01-18
Payer: MEDICARE

## 2019-01-18 ENCOUNTER — HOSPITAL ENCOUNTER (OUTPATIENT)
Facility: AMBULARY SURGERY CENTER | Age: 69
Setting detail: OUTPATIENT SURGERY
Discharge: HOME | End: 2019-01-18
Attending: ANESTHESIOLOGY | Admitting: ANESTHESIOLOGY
Payer: MEDICARE

## 2019-01-18 VITALS
RESPIRATION RATE: 16 BRPM | HEIGHT: 70 IN | WEIGHT: 268 LBS | HEART RATE: 65 BPM | OXYGEN SATURATION: 95 % | BODY MASS INDEX: 38.37 KG/M2 | DIASTOLIC BLOOD PRESSURE: 75 MMHG | SYSTOLIC BLOOD PRESSURE: 130 MMHG | TEMPERATURE: 96.6 F

## 2019-01-18 PROCEDURE — 64636 DESTROY L/S FACET JNT ADDL: CPT | Performed by: ANESTHESIOLOGY

## 2019-01-18 PROCEDURE — 64635 DESTROY LUMB/SAC FACET JNT: CPT | Performed by: ANESTHESIOLOGY

## 2019-01-18 PROCEDURE — 72020 X-RAY EXAM OF SPINE 1 VIEW: CPT

## 2019-01-18 RX ORDER — LIDOCAINE WITH 8.4% SOD BICARB 0.9%(10ML)
SYRINGE (ML) INJECTION AS NEEDED
Status: DISCONTINUED | OUTPATIENT
Start: 2019-01-18 | End: 2019-01-18 | Stop reason: HOSPADM

## 2019-01-18 RX ORDER — LIDOCAINE HYDROCHLORIDE 20 MG/ML
INJECTION, SOLUTION EPIDURAL; INFILTRATION; INTRACAUDAL; PERINEURAL AS NEEDED
Status: DISCONTINUED | OUTPATIENT
Start: 2019-01-18 | End: 2019-01-18 | Stop reason: HOSPADM

## 2019-01-18 NOTE — DISCHARGE INSTRUCTIONS

## 2019-01-18 NOTE — TELEPHONE ENCOUNTER
To be called on 1/21/19  S/P L3,L4,L5,S1 RFA with AS on 1/18/19  Patient needs 4-6 week follow up with [facility]

## 2019-01-18 NOTE — OP NOTE
ATTENDING PHYSICIAN:   Teo Bautista MD      PREPROCEDURE DIAGNOSIS:  Left lumbar facet arthropathy  POSTPROCEDURE DIAGNOSIS:  Left lumbar facet arthropathy  PROCEDURE: Left L3, L4, L5, and S1 lumbar facet nerve radiofrequency ablation under fluoroscopic guidance  ANESTHESIA:  Local     ESTIMATED BLOOD LOSS:  Minimal     COMPLICATIONS:  None  LOCATION:   [place] [address], [facility]  HISTORY OF PRESENTING ILLNESS:   We feel radiofrequency denervation of the L3, L4, L5, and S1 facets is medically necessary, given the patient has disabling low back pain, which we suspect is facet mediated in the absence of nerve root compression or radicular pain  The patient has had two positive confirmatory diagnostic medial branch blocks  The patient has also failed three months of conservative therapy, including nonopioid medications, manipulation, physical therapy and home exercise program   The patient has not been treated with radiofrequency denervation at this anatomic location within the past six months  CONSENT:  Today's procedure, its potential benefits as well as its risks and potential side effects were reviewed  Discussed risks of the procedure including bleeding, infection, nerve irritation or damage, reactions to the medications, failure of the pain to improve, and potential worsening of the pain were explained in detail to the patient who verbalized understanding and who wished to proceed  Written informed consent was thereby obtained  DESCRIPTION OF THE PROCEDURE: After written informed consent was obtained, the patient was taken to the fluoroscopy suite and placed in the prone position  Anatomical landmarks were identified by way of palpation with fluoroscopy in the PA and oblique views  The patient's lumbar region was then prepped and draped in the usual sterile fashion using Chlorhexidine    The skin and subcutaneous tissues were subsequently infiltrated with approximately 1 ml of 1% preservative free Lidocaine at each of the four intended needle entry sites using a 25 gauge 1-1/2 inch needle  Via fluoroscopy in the AP and oblique views, an active tip needle was then incrementally advanced under fluoroscopic guidance at each level  At each of these levels, the needle tip was made to contact os at the superior medial border of the junction of the transverse process of the lumbar levels and to contact the os at the medial aspect of the groove formed by the sacral ala in the superior articular process of S1  After proper needle placement was confirmed with fluoroscopic guidance at each level, sensory and motor stimulation was performed at 2 Hz and 50 Hz  At all levels, there was negative extension into the lower extremities  Following this portion of the procedure, a 1 ml injectate of 2% preservative free Lidocaine was instilled at all of the levels  After a period of approximately 90 seconds, each level was lesioned at 90 degrees Celsius  Following the initial lesioning, each needle tip was repositioned x 2 under fluoroscopic guidance in a clockwise and counterclockwise fashion  Following each reposition, a total of two additional lesions at each side were performed for 90 seconds at 90 degrees Celsius  All needles were removed with the tips intact  The patient tolerated the procedure and hemostasis was maintained  There were no apparent paresthesias or complications  This skin was wiped clean and a Band-Aid was placed as appropriate  The patient was monitored for an appropriate period of time following the procedure and remained hemodynamically stable and neurovascularly intact  The patient was ultimately discharged to home with supervision in good condition and instructed to call the office to report the response  I was present for and participated in all key and critical portions of this procedure      Silvana Retana MD  1/18/2019  8:12 AM

## 2019-01-21 NOTE — TELEPHONE ENCOUNTER
SW patient, states he had some soreness at the injection site over the weekend, but is feeling much better today  Denies any fever, redness,drainage, headache, stiffness of neck or sun-burning sensation  Patient would like to come in for follow up office visit in 6 weeks  Patient is scheduled with AS on 2/25/19 to arrive at 10:00 for 10:15 appointment

## 2019-02-22 ENCOUNTER — TELEPHONE (OUTPATIENT)
Dept: CARDIOLOGY CLINIC | Facility: CLINIC | Age: 69
End: 2019-02-22

## 2019-02-22 NOTE — TELEPHONE ENCOUNTER
Edmond from [facility] called regarding prior authorizations for metoprolol, lisinopril, and atorvastatin  He would like a call back when you get the chance [phone]    Ref # [id number]

## 2019-02-25 ENCOUNTER — OFFICE VISIT (OUTPATIENT)
Dept: PAIN MEDICINE | Facility: CLINIC | Age: 69
End: 2019-02-25
Payer: MEDICARE

## 2019-02-25 VITALS
HEIGHT: 70 IN | WEIGHT: 279.4 LBS | SYSTOLIC BLOOD PRESSURE: 130 MMHG | DIASTOLIC BLOOD PRESSURE: 66 MMHG | RESPIRATION RATE: 18 BRPM | BODY MASS INDEX: 40 KG/M2 | HEART RATE: 67 BPM

## 2019-02-25 DIAGNOSIS — M47.816 LUMBAR SPONDYLOSIS: ICD-10-CM

## 2019-02-25 DIAGNOSIS — M54.50 CHRONIC BILATERAL LOW BACK PAIN WITHOUT SCIATICA: ICD-10-CM

## 2019-02-25 DIAGNOSIS — G89.29 CHRONIC BILATERAL LOW BACK PAIN WITHOUT SCIATICA: ICD-10-CM

## 2019-02-25 DIAGNOSIS — G89.4 CHRONIC PAIN SYNDROME: Primary | ICD-10-CM

## 2019-02-25 PROCEDURE — 99213 OFFICE O/P EST LOW 20 MIN: CPT | Performed by: ANESTHESIOLOGY

## 2019-02-25 PROCEDURE — 1124F ACP DISCUSS-NO DSCNMKR DOCD: CPT | Performed by: ANESTHESIOLOGY

## 2019-02-25 NOTE — PATIENT INSTRUCTIONS
Core Strengthening Exercises   WHAT YOU NEED TO KNOW:   What do I need to know about core strengthening exercises? Core strengthening exercises help heal and strengthen muscles of your hips, back, and abdomen to prevent reinjury  They are beginning exercises to help support your spine  Ask your healthcare provider if you need to see a physical therapist for more advanced exercises  · Do the exercises on a mat or firm surface  (not on a bed) to support your spine and avoid low back pain  · Do the exercises in the same order every time  to train your muscles to work together  Your healthcare provider will show you how to perform these exercises  Do them every day, or as directed by your healthcare provider  · Move slowly and smoothly  Avoid fast or jerky motions  · Stop if you feel pain  It is normal to feel some discomfort at first  Regular exercise will help decrease your discomfort over time  How do I perform core strengthening exercises safely? Hold each exercise for 5 seconds  When you can do the exercise without pain for 5 seconds, increase your hold to 10 to 15 seconds  When you can do the exercise without pain for 10 to 15 seconds, add the next exercise  Increase the time you hold each exercise, or repeat the exercises as directed  As you do each exercise, breathe normally  Do not hold your breath  · Abdominal bracing:  Lie on your back with your knees bent and feet flat on the floor  Place your arms in a relaxed position beside your body  Pull your belly button in toward your spine  Do not flatten or arch your back  Tighten the abdominal muscles below your belly button  Hold for 5 seconds  Begin all of your exercises with abdominal bracing  You can also practice abdominal bracing throughout the day while you are sitting or standing  · Bridging:  Lie on your back with your knees bent and feet flat on the floor  Rest your arms at your side   Tighten your buttocks, and then lift your hips 1 inch off the floor  Hold for 5 seconds  When you can do this exercise without pain for 10 seconds, increase the distance you lift your hips  A good goal is to be able to lift your hips so that your shoulders, hips, and knees are in a straight line  · Curl up:  Lie on your back with your knees bent and feet flat on the floor  Place your hands, palms down, underneath the curve in your lower back  Next, with your elbows on the floor, lift your shoulders and chest 2 to 3 inches  Keep your head in line with your shoulders  Hold this position for 5 seconds  When you can do this exercise without pain for 10 to 15 seconds, you may add a rotation  While your shoulders and chest are lifted off the ground, turn slightly to the left and hold  Repeat on the other side  · Dead bug:  Lie on your back with your knees bent and feet flat on the floor  Place your arms in a relaxed position beside your body  Begin with abdominal bracing  Next, raise one leg, keeping your knee bent  Hold for 5 seconds  Repeat with the other leg  When you can do this exercise without pain for 10 to 15 seconds, you may raise one straight leg and hold  Repeat with the other leg  · Quadruped:  Place your hands and knees on the floor  Keep your wrists directly below your shoulders and your knees directly below your hips  Pull your belly button in toward your spine  Do not flatten or arch your back  Tighten your abdominal muscles below your belly button  Hold for 5 seconds  When you can do this exercise without pain for 10 to 15 seconds, you may extend one arm and hold  Repeat on the other side  · Side Bridge:      ¨ Standing side bridge:  Stand next to a wall and extend one arm toward the wall  Place your palm flat on the wall with your fingers pointing upward  Begin with abdominal bracing  Next, without moving your feet, slowly bend your arm to 90 degrees  Hold for 5 seconds  Repeat on the other side   When you can do this exercise without pain for 10 to 15 seconds, you may do the bent leg side bridge on the floor  ¨ Bent leg side bridge:  Lie on one side with your legs, hips, and shoulders in a straight line  Prop yourself up onto your forearm so your elbow is directly below your shoulder  Bend your knees back to 90 degrees  Begin with abdominal bracing  Next, lift your hips and balance yourself on your forearm and knees  Hold for 5 seconds  Repeat on the other side  When you can do this exercise without pain for 10 to 15 seconds, you may do the straight leg side bridge on the floor  ¨ Straight leg side bridge:  Lie on one side with your legs, hips, and shoulders in a straight line  Prop yourself up onto your forearm so your elbow is directly below your shoulder  Begin with abdominal bracing  Lift your hips off the floor and balance yourself on your forearm and the outside of your flexed foot  Do not let your ankle bend sideways  Hold for 5 seconds  Repeat on the other side  When you can do this exercise without pain for 10 to 15 seconds, ask your healthcare provider for more advanced exercises  When should I contact my healthcare provider? · Your pain becomes worse  · You have new pain  · You have questions or concerns about your condition, care, or exercise program   CARE AGREEMENT:   You have the right to help plan your care  Learn about your health condition and how it may be treated  Discuss treatment options with your caregivers to decide what care you want to receive  You always have the right to refuse treatment  The above information is an  only  It is not intended as medical advice for individual conditions or treatments  Talk to your doctor, nurse or pharmacist before following any medical regimen to see if it is safe and effective for you    © 2016 [employer] is for End User's use only and may not be sold, redistributed or otherwise used for commercial purposes  All illustrations and images included in CareNotes® are the copyrighted property of [employer]  or [employer]  Lower Back Exercises   WHAT YOU NEED TO KNOW:   Lower back exercises help heal and strengthen your back muscles to prevent another injury  Ask your healthcare provider if you need to see a physical therapist for more advanced exercises  DISCHARGE INSTRUCTIONS:   Return to the emergency department if:   · You have severe pain that prevents you from moving  Contact your healthcare provider if:   · Your pain becomes worse  · You have new pain  · You have questions or concerns about your condition or care  Do lower back exercises safely:   · Do the exercises on a mat or firm surface  (not on a bed) to support your spine and prevent low back pain  · Move slowly and smoothly  Avoid fast or jerky motions  · Breathe normally  Do not hold your breath  · Stop if you feel pain  It is normal to feel some discomfort at first  Regular exercise will help decrease your discomfort over time  Lower back exercises: Your healthcare provider may recommend that you do back exercises 10 to 30 minutes each day  He may also recommend that you do exercises 1 to 3 times each day  Ask your healthcare provider which exercises are best for you and how often to do them  · Ankle pumps:  Lie on your back  Move your foot up (with your toes pointing toward your head)  Then, move your foot down (with your toes pointing away from you)  Repeat this exercise 10 times on each side  · Heel slides:  Lie on your back  Slowly bend one leg and then straighten it  Next, bend the other leg and then straighten it  Repeat 10 times on each side  · Pelvic tilt:  Lie on your back with your knees bent and feet flat on the floor  Place your arms in a relaxed position beside your body  Tighten the muscles of your abdomen and flatten your back against the floor  Hold for 5 seconds  Repeat 5 times  · Back stretch:  Lie on your back with your hands behind your head  Bend your knees and turn the lower half of your body to one side  Hold this position for 10 seconds  Repeat 3 times on each side  · Straight leg raises:  Lie on your back with one leg straight  Bend the other knee  Tighten your abdomen and then slowly lift the straight leg up about 6 to 12 inches off the floor  Hold for 1 to 5 seconds  Lower your leg slowly  Repeat 10 times on each leg  · Knee-to-chest:  Lie on your back with your knees bent and feet flat on the floor  Pull one of your knees toward your chest and hold it there for 5 seconds  Return your leg to the starting position  Lift the other knee toward your chest and hold for 5 seconds  Do this 5 times on each side  · Cat and camel:  Place your hands and knees on the floor  Arch your back upward toward the ceiling and lower your head  Round out your spine as much as you can  Hold for 5 seconds  Lift your head upward and push your chest downward toward the floor  Hold for 5 seconds  Do 3 sets or as directed  · Wall squats:  Stand with your back against a wall  Tighten the muscles of your abdomen  Slowly lower your body until your knees are bent at a 45 degree angle  Hold this position for 5 seconds  Slowly move back up to a standing position  Repeat 10 times  · Curl up:  Lie on your back with your knees bent and feet flat on the floor  Place your hands, palms down, underneath the curve in your lower back  Next, with your elbows on the floor, lift your shoulders and chest 2 to 3 inches  Keep your head in line with your shoulders  Hold this position for 5 seconds  When you can do this exercise without pain for 10 to 15 seconds, you may add a rotation  While your shoulders and chest are lifted off the ground, turn slightly to the left and hold  Repeat on the other side             · Bird dog:  Place your hands and knees on the floor  Keep your wrists directly below your shoulders and your knees directly below your hips  Pull your belly button in toward your spine  Do not flatten or arch your back  Tighten your abdominal muscles  Raise one arm straight out so that it is aligned with your head  Next, raise the leg opposite your arm  Hold this position for 15 seconds  Lower your arm and leg slowly and change sides  Do 5 sets  © 2017 [address] [facility] Information is for End User's use only and may not be sold, redistributed or otherwise used for commercial purposes  All illustrations and images included in CareNotes® are the copyrighted property of [employer]  or [employer]  The above information is an  only  It is not intended as medical advice for individual conditions or treatments  Talk to your doctor, nurse or pharmacist before following any medical regimen to see if it is safe and effective for you

## 2019-02-25 NOTE — PROGRESS NOTES
Pain Medicine Follow-Up Note    Assessment:  1  Chronic pain syndrome    2  Chronic bilateral low back pain without sciatica    3  Lumbar spondylosis        Plan:  My impressions and treatment recommendations were discussed in detail with the patient who verbalized understanding and had no further questions  The patient is reporting excellent pain relief following the lumbar facet radiofrequency ablation is performed on the right L3-S1 level on January 4, 2019 and the left L3-S1 level on January 18, 2019  He does not wish to undergo physical therapy at this point but is interested in undergoing home exercises  As such, I have given him a list of home exercises to perform  I did discuss with the patient that should his pain worsen or return, I would be happy to perform interventional pain procedures for him  The patient verbalized understanding  Follow-up is planned on an as-needed basis  Discharge instructions were provided  I personally saw and examined the patient and I agree with the above discussed plan of care  History of Present Illness:    Lilly Friedman is a 77 y o  male who presents to [facility] for interval re-evaluation of the above stated pain complaints  The patient has a past medical and chronic pain history as outlined in the assessment section  He was last seen on January 18, 2019 at which time he underwent a left L3-S1 lumbar facet radiofrequency ablation  Of note, he also underwent a right L3-S1 lumbar facet radiofrequency ablation on January 4, 2019  At today's office visit, the patient's pain score is 2/10 on the verbal numerical pain rating scale  The patient states that his pain is worse in the morning evening  His pain is constant i n nature  He describes the quality of his pain as dull/aching    He is reporting excellent pain relief following the lumbar facet radiofrequency ablations    He is not interested in physical therapy at this time, but would like a list of exercises to perform at home to help continue improving his low back pain  Other than as stated above, the patient denies any interval changes in medications, medical condition, mental condition, symptoms, or allergies since the last office visit  Review of Systems:    Review of Systems   Respiratory: Negative for shortness of breath  Cardiovascular: Negative for chest pain  Gastrointestinal: Negative for constipation, diarrhea, nausea and vomiting  Musculoskeletal: Negative for arthralgias, gait problem, joint swelling and myalgias  Skin: Negative for rash  Neurological: Negative for dizziness, seizures and weakness  All other systems reviewed and are negative  Patient Active Problem List   Diagnosis    Chronic pain syndrome    Chronic bilateral low back pain without sciatica    Lumbar spondylosis    Low back pain       Past Medical History:   Diagnosis Date    Coronary artery disease involving native coronary artery of native heart without angina pectoris     Hyperlipidemia     Hypertension     Melanoma ([address] )     back, removed    Stented coronary artery        Past Surgical History:   Procedure Laterality Date    NERVE BLOCK Bilateral 12/28/2018    Procedure: L3 L4 L5 S1 Medical Branch Block #2 ([id number]); Surgeon: Aleyda Myers MD;  [place] [facility] [place];  Service: Pain Management     NERVE BLOCK Bilateral 12/21/2018    Procedure: L3 L4 L5 S1 Medial Branch Block #1 ([id number]);   Surgeon: Aleyda Myers MD;  [place] [facility] [place];  Service: Pain Management     RADIOFREQUENCY ABLATION Right 1/4/2019    Procedure: L3 L4 L5 S1 Radio Frequency Ablation;  Surgeon: Aleyda Myers MD;  Location: [place] [address] MAIN OR;  Service: Pain Management     RADIOFREQUENCY ABLATION Left 1/18/2019    Procedure: L3 L4 L5 S1 Radio Frequency Ablation;  Surgeon: Aleyda Myers MD;  Location: [place] [address] MAIN OR;  Service: Pain Management        History reviewed  No pertinent family history      Social History     Occupational History    Not on file   Tobacco Use    Smoking status: Former Smoker    Smokeless tobacco: Former User   Substance and Sexual Activity    Alcohol use: No    Drug use: No    Sexual activity: Not on file         Current Outpatient Medications:     aspirin 1 mg/mL SUSP, Take 162 mg by mouth , Disp: , Rfl:     atorvastatin (LIPITOR) 40 mg tablet, Take one tablet daily at bedtime, Disp: 90 tablet, Rfl: 3    lisinopril-hydrochlorothiazide (PRINZIDE,ZESTORETIC) 10-12 5 MG per tablet, Take 1 tablet by mouth daily Lisinopril-Hydrochlorothiazide 10-12 5 MG Oral Tablet One daily  Quantity: 90;  Refills: 3    Mary Mohamud M D ;  Started 2-Mar-2015 Active, Disp: 30 tablet, Rfl: 11    metoprolol succinate (TOPROL-XL) 25 mg 24 hr tablet, Take 1 tablet (25 mg total) by mouth daily, Disp: 90 tablet, Rfl: 2    nitroglycerin (NITROSTAT) 0 4 mg SL tablet, Place 1 tablet under the tongue, Disp: , Rfl:     Allergies   Allergen Reactions    Seasonal Ic  [Cholestatin]        Physical Exam:    /66 (BP Location: Right arm, Patient Position: Sitting, Cuff Size: Standard)   Pulse 67   Resp 18   Ht 5' 10" (1 778 m)   Wt 127 kg (279 lb 6 4 oz)   BMI 40 09 kg/m²     Constitutional:obese  Eyes:anicteric  HEENT:grossly intact  Neck:supple, symmetric, trachea midline and no masses   Pulmonary:even and unlabored  Cardiovascular:No edema or pitting edema present  Skin:Normal without rashes or lesions and well hydrated  Psychiatric:Mood and affect appropriate  Neurologic:Cranial Nerves II-XII grossly intact  Musculoskeletal:normal

## 2019-02-26 ENCOUNTER — OFFICE VISIT (OUTPATIENT)
Dept: CARDIOLOGY CLINIC | Facility: CLINIC | Age: 69
End: 2019-02-26
Payer: MEDICARE

## 2019-02-26 ENCOUNTER — APPOINTMENT (OUTPATIENT)
Dept: LAB | Facility: CLINIC | Age: 69
End: 2019-02-26
Payer: MEDICARE

## 2019-02-26 VITALS
HEART RATE: 77 BPM | SYSTOLIC BLOOD PRESSURE: 120 MMHG | BODY MASS INDEX: 40.07 KG/M2 | WEIGHT: 279.9 LBS | OXYGEN SATURATION: 97 % | DIASTOLIC BLOOD PRESSURE: 84 MMHG | HEIGHT: 70 IN

## 2019-02-26 DIAGNOSIS — I25.10 CORONARY ARTERY DISEASE INVOLVING NATIVE CORONARY ARTERY OF NATIVE HEART WITHOUT ANGINA PECTORIS: Primary | ICD-10-CM

## 2019-02-26 DIAGNOSIS — I25.10 CORONARY ARTERY DISEASE INVOLVING NATIVE CORONARY ARTERY OF NATIVE HEART WITHOUT ANGINA PECTORIS: ICD-10-CM

## 2019-02-26 DIAGNOSIS — I49.3 PVC (PREMATURE VENTRICULAR CONTRACTION): ICD-10-CM

## 2019-02-26 DIAGNOSIS — I10 HYPERTENSION, UNSPECIFIED TYPE: ICD-10-CM

## 2019-02-26 LAB
ALBUMIN SERPL BCP-MCNC: 3.9 G/DL (ref 3.5–5)
ALP SERPL-CCNC: 87 U/L (ref 46–116)
ALT SERPL W P-5'-P-CCNC: 34 U/L (ref 12–78)
ANION GAP SERPL CALCULATED.3IONS-SCNC: 6 MMOL/L (ref 4–13)
AST SERPL W P-5'-P-CCNC: 20 U/L (ref 5–45)
BILIRUB SERPL-MCNC: 1.4 MG/DL (ref 0.2–1)
BUN SERPL-MCNC: 22 MG/DL (ref 5–25)
CALCIUM SERPL-MCNC: 9.3 MG/DL (ref 8.3–10.1)
CHLORIDE SERPL-SCNC: 104 MMOL/L (ref 100–108)
CHOLEST SERPL-MCNC: 115 MG/DL (ref 50–200)
CO2 SERPL-SCNC: 31 MMOL/L (ref 21–32)
CREAT SERPL-MCNC: 1.03 MG/DL (ref 0.6–1.3)
GFR SERPL CREATININE-BSD FRML MDRD: 73 ML/MIN/1.73SQ M
GLUCOSE P FAST SERPL-MCNC: 82 MG/DL (ref 65–99)
HDLC SERPL-MCNC: 41 MG/DL (ref 40–60)
LDLC SERPL CALC-MCNC: 57 MG/DL (ref 0–100)
POTASSIUM SERPL-SCNC: 4.9 MMOL/L (ref 3.5–5.3)
PROT SERPL-MCNC: 7.3 G/DL (ref 6.4–8.2)
SODIUM SERPL-SCNC: 141 MMOL/L (ref 136–145)
TRIGL SERPL-MCNC: 85 MG/DL

## 2019-02-26 PROCEDURE — 99214 OFFICE O/P EST MOD 30 MIN: CPT | Performed by: INTERNAL MEDICINE

## 2019-02-26 PROCEDURE — 80061 LIPID PANEL: CPT

## 2019-02-26 PROCEDURE — 36415 COLL VENOUS BLD VENIPUNCTURE: CPT

## 2019-02-26 PROCEDURE — 80053 COMPREHEN METABOLIC PANEL: CPT

## 2019-02-26 PROCEDURE — 93000 ELECTROCARDIOGRAM COMPLETE: CPT | Performed by: INTERNAL MEDICINE

## 2019-02-26 NOTE — PROGRESS NOTES
Cardiology Follow Up    Antonietta Neves  1950  [id number]  [address] [facility] [place]  [address] [facility] [address] [facility]  [phone]    1  Coronary artery disease involving native coronary artery of native heart without angina pectoris  POCT ECG   2  Hypertension, unspecified type  POCT ECG       Interval History: Followup cad    Doing well  No chest pain, no dyspnea and no palpitations       Problem List     Chronic pain syndrome    Chronic bilateral low back pain without sciatica    Lumbar spondylosis    Low back pain    Overview Signed 12/17/2018  3:54 PM by Johann Alanis     Added automatically from request for surgery [id number]             Past Medical History:   Diagnosis Date    Coronary artery disease involving native coronary artery of native heart without angina pectoris     Hyperlipidemia     Hypertension     Melanoma ([address] )     back, removed    Stented coronary artery      Social History     Socioeconomic History    Marital status: /Civil Union     Spouse name: Not on file    Number of children: Not on file    Years of education: Not on file    Highest education level: Not on file   Occupational History    Not on file   Social Needs    Financial resource strain: Not on file    Food insecurity:     Worry: Not on file     Inability: Not on file    Transportation needs:     Medical: Not on file     Non-medical: Not on file   Tobacco Use    Smoking status: Former Smoker    Smokeless tobacco: Former User   Substance and Sexual Activity    Alcohol use: No    Drug use: No    Sexual activity: Not on file   Lifestyle    Physical activity:     Days per week: Not on file     Minutes per session: Not on file    Stress: Not on file   Relationships    Social connections:     Talks on phone: Not on file     Gets together: Not on file     Attends Jainism service: Not on file     Active member of club or organization: Not on file     Attends meetings of clubs or organizations: Not on file     Relationship status: Not on file    Intimate partner violence:     Fear of current or ex partner: Not on file     Emotionally abused: Not on file     Physically abused: Not on file     Forced sexual activity: Not on file   Other Topics Concern    Not on file   Social History Narrative    Not on file      Family History   Problem Relation Age of Onset    ALS Mother     Prostate cancer Father     Pancreatic cancer Father     Breast cancer Sister     Arrhythmia Neg Hx     Clotting disorder Neg Hx     Fainting Neg Hx     Heart attack Neg Hx     Heart disease Neg Hx     Hypertension Neg Hx     Hyperlipidemia Neg Hx     Anuerysm Neg Hx     Stroke Neg Hx      Past Surgical History:   Procedure Laterality Date    BILATERAL VATS ABLATION      NERVE BLOCK Bilateral 12/28/2018    Procedure: L3 L4 L5 S1 Medical Branch Block #2 ([id number]); Surgeon: Lacey Gracia MD;  Location: [facility] MAIN OR;  Service: Pain Management     NERVE BLOCK Bilateral 12/21/2018    Procedure: L3 L4 L5 S1 Medial Branch Block #1 ([id number]);   Surgeon: Lacey Gracia MD;  Location: [facility] MAIN OR;  Service: Pain Management     RADIOFREQUENCY ABLATION Right 1/4/2019    Procedure: L3 L4 L5 S1 Radio Frequency Ablation;  Surgeon: Lacey Gracia MD;  Location: [facility] MAIN OR;  Service: Pain Management     RADIOFREQUENCY ABLATION Left 1/18/2019    Procedure: L3 L4 L5 S1 Radio Frequency Ablation;  Surgeon: Lacey Gracia MD;  Location: [facility] MAIN OR;  Service: Pain Management        Current Outpatient Medications:     aspirin 1 mg/mL SUSP, Take 162 mg by mouth , Disp: , Rfl:     atorvastatin (LIPITOR) 40 mg tablet, Take one tablet daily at bedtime, Disp: 90 tablet, Rfl: 3    lisinopril-hydrochlorothiazide (PRINZIDE,ZESTORETIC) 10-12 5 MG per tablet, Take 1 tablet by mouth daily Lisinopril-Hydrochlorothiazide 10-12 5 MG Oral Tablet One daily  Quantity: 90;  Refills: 3    Guanakito LONDONO D ;  Started 2-Mar-2015 Active, Disp: 30 tablet, Rfl: 11    metoprolol succinate (TOPROL-XL) 25 mg 24 hr tablet, Take 1 tablet (25 mg total) by mouth daily, Disp: 90 tablet, Rfl: 2    nitroglycerin (NITROSTAT) 0 4 mg SL tablet, Place 1 tablet under the tongue, Disp: , Rfl:   Allergies   Allergen Reactions    Seasonal Ic  [Cholestatin]        Labs:     Chemistry        Component Value Date/Time     03/07/2015 1123    K 4 5 02/16/2018 1012    K 4 6 03/07/2015 1123     02/16/2018 1012     03/07/2015 1123    CO2 28 02/16/2018 1012    CO2 31 03/07/2015 1123    BUN 22 02/16/2018 1012    BUN 22 03/07/2015 1123    CREATININE 0 98 02/16/2018 1012    CREATININE 0 92 03/07/2015 1123        Component Value Date/Time    CALCIUM 9 5 02/16/2018 1012    CALCIUM 9 5 03/07/2015 1123    ALKPHOS 68 02/16/2018 1012    ALKPHOS 81 07/13/2015 1108    AST 15 02/16/2018 1012    AST 34 07/13/2015 1108    ALT 20 02/16/2018 1012    ALT 14 07/13/2015 1108    BILITOT 1 3 (H) 07/13/2015 1108            Lab Results   Component Value Date    CHOL 107 07/13/2015    CHOL 105 02/05/2015     Lab Results   Component Value Date    HDL 42 02/16/2018    HDL 44 02/27/2017    HDL 39 (L) 07/25/2016     Lab Results   Component Value Date    LDLCALC 48 02/16/2018    LDLCALC 36 02/27/2017    LDLCALC 35 07/25/2016     Lab Results   Component Value Date    TRIG 105 02/16/2018    TRIG 137 02/27/2017    TRIG 122 07/25/2016     No results found for: CHOLHDL    Imaging: No results found  EKG: NSR  PVCs  Review of Systems   Constitution: Negative  HENT: Negative  Eyes: Negative  Cardiovascular: Negative  Respiratory: Negative  Endocrine: Negative  Hematologic/Lymphatic: Negative  Skin: Negative  Musculoskeletal: Negative  Gastrointestinal: Negative  Genitourinary: Negative  Neurological: Negative  Psychiatric/Behavioral: Negative  Allergic/Immunologic: Negative  Vitals:    02/26/19 1026   BP: 120/84   Pulse: 77   SpO2: 97%           Physical Exam   Constitutional: He is oriented to person, place, and time  No distress  HENT:   Mouth/Throat: No oropharyngeal exudate  Eyes: No scleral icterus  Neck: No JVD present  Cardiovascular: Normal rate and regular rhythm  No murmur heard  Pulmonary/Chest: Effort normal and breath sounds normal  No stridor  No respiratory distress  He has no wheezes  Abdominal: Soft  Bowel sounds are normal  He exhibits no distension  There is no tenderness  There is no guarding  Musculoskeletal: He exhibits no edema  Neurological: He is alert and oriented to person, place, and time  Skin: Skin is warm and dry  He is not diaphoretic  Psychiatric: He has a normal mood and affect  Discussion/Summary:    1  Coronary Artery Disease: Overall stable  Continue medical therapy  LDL was 48  He has no angina       2  HTN: BP is stable on medical therapy  He has some PVCs and will do 24 hour holter  The patient was counseled regarding diagnostic results, instructions for management, risk factor reductions, impressions  total time of encounter was 25 minutes and 15 minutes was spent counseling

## 2019-03-14 ENCOUNTER — HOSPITAL ENCOUNTER (OUTPATIENT)
Dept: NON INVASIVE DIAGNOSTICS | Facility: CLINIC | Age: 69
Discharge: HOME | End: 2019-03-14
Payer: MEDICARE

## 2019-03-14 DIAGNOSIS — I49.3 PVC (PREMATURE VENTRICULAR CONTRACTION): ICD-10-CM

## 2019-03-14 PROCEDURE — 93226 XTRNL ECG REC<48 HR SCAN A/R: CPT

## 2019-03-14 PROCEDURE — 93225 XTRNL ECG REC<48 HRS REC: CPT

## 2019-03-20 PROCEDURE — 93227 XTRNL ECG REC<48 HR R&I: CPT | Performed by: INTERNAL MEDICINE

## 2019-03-21 ENCOUNTER — TELEPHONE (OUTPATIENT)
Dept: CARDIOLOGY CLINIC | Facility: CLINIC | Age: 69
End: 2019-03-21

## 2019-03-21 DIAGNOSIS — I25.10 CORONARY ARTERY DISEASE INVOLVING NATIVE CORONARY ARTERY OF NATIVE HEART WITHOUT ANGINA PECTORIS: ICD-10-CM

## 2019-03-21 RX ORDER — METOPROLOL SUCCINATE 25 MG/1
25 TABLET, EXTENDED RELEASE ORAL 2 TIMES DAILY
Qty: 180 TABLET | Refills: 3 | Status: SHIPPED | OUTPATIENT
Start: 2019-03-21 | End: 2019-04-19 | Stop reason: SDUPTHER

## 2019-03-21 NOTE — TELEPHONE ENCOUNTER
Pt stated he missed a call from Dr Moo López this morning and he would like a return call       [phone]

## 2019-04-19 DIAGNOSIS — I25.10 CORONARY ARTERY DISEASE INVOLVING NATIVE CORONARY ARTERY OF NATIVE HEART WITHOUT ANGINA PECTORIS: ICD-10-CM

## 2019-04-19 RX ORDER — METOPROLOL SUCCINATE 25 MG/1
25 TABLET, EXTENDED RELEASE ORAL 2 TIMES DAILY
Qty: 180 TABLET | Refills: 2 | Status: SHIPPED | OUTPATIENT
Start: 2019-04-19 | End: 2019-11-23 | Stop reason: SDUPTHER

## 2019-04-25 ENCOUNTER — TELEPHONE (OUTPATIENT)
Dept: CARDIOLOGY CLINIC | Facility: CLINIC | Age: 69
End: 2019-04-25

## 2019-08-31 DIAGNOSIS — I10 HYPERTENSION, UNSPECIFIED TYPE: ICD-10-CM

## 2019-08-31 DIAGNOSIS — I25.10 CAD (CORONARY ARTERY DISEASE): ICD-10-CM

## 2019-09-03 RX ORDER — LISINOPRIL AND HYDROCHLOROTHIAZIDE 12.5; 1 MG/1; MG/1
TABLET ORAL
Qty: 90 TABLET | Refills: 3 | Status: SHIPPED | OUTPATIENT
Start: 2019-09-03 | End: 2019-09-13 | Stop reason: SDUPTHER

## 2019-09-03 RX ORDER — ATORVASTATIN CALCIUM 40 MG/1
TABLET, FILM COATED ORAL
Qty: 90 TABLET | Refills: 3 | Status: SHIPPED | OUTPATIENT
Start: 2019-09-03 | End: 2019-09-13 | Stop reason: SDUPTHER

## 2019-09-13 DIAGNOSIS — I10 HYPERTENSION, UNSPECIFIED TYPE: ICD-10-CM

## 2019-09-13 DIAGNOSIS — I25.10 CAD (CORONARY ARTERY DISEASE): ICD-10-CM

## 2019-09-13 RX ORDER — LISINOPRIL AND HYDROCHLOROTHIAZIDE 12.5; 1 MG/1; MG/1
1 TABLET ORAL DAILY
Qty: 90 TABLET | Refills: 3 | Status: SHIPPED | OUTPATIENT
Start: 2019-09-13 | End: 2019-11-09 | Stop reason: HOSPADM

## 2019-09-13 RX ORDER — ATORVASTATIN CALCIUM 40 MG/1
TABLET, FILM COATED ORAL
Qty: 90 TABLET | Refills: 3 | Status: SHIPPED | OUTPATIENT
Start: 2019-09-13 | End: 2020-06-09 | Stop reason: SDUPTHER

## 2019-10-29 ENCOUNTER — OFFICE VISIT (OUTPATIENT)
Dept: CARDIOLOGY CLINIC | Facility: CLINIC | Age: 69
End: 2019-10-29
Payer: MEDICARE

## 2019-10-29 VITALS
HEART RATE: 84 BPM | HEIGHT: 70 IN | DIASTOLIC BLOOD PRESSURE: 76 MMHG | SYSTOLIC BLOOD PRESSURE: 118 MMHG | OXYGEN SATURATION: 94 % | BODY MASS INDEX: 39.54 KG/M2 | WEIGHT: 276.2 LBS

## 2019-10-29 DIAGNOSIS — I25.10 CORONARY ARTERY DISEASE INVOLVING NATIVE CORONARY ARTERY OF NATIVE HEART WITHOUT ANGINA PECTORIS: Primary | ICD-10-CM

## 2019-10-29 DIAGNOSIS — I49.3 PVC (PREMATURE VENTRICULAR CONTRACTION): Primary | ICD-10-CM

## 2019-10-29 PROCEDURE — 99214 OFFICE O/P EST MOD 30 MIN: CPT | Performed by: INTERNAL MEDICINE

## 2019-10-29 NOTE — PROGRESS NOTES
Cardiology Follow Up    Shea Teena  1950  [id number]  [facility] [place]  [address]  [address]  [place] [address]  [phone]    No diagnosis found  Interval History: Followup CAD    Doing well  No chest pain, no dyspnea no palpitations  We reviewed his holter and metoprolol was increased  He is asymptomatic       Problem List     Chronic pain syndrome    Chronic bilateral low back pain without sciatica    Lumbar spondylosis    Low back pain    Overview Signed 12/17/2018  3:54 PM by Jessika Mcdonnell     Added automatically from request for surgery [id number]             Past Medical History:   Diagnosis Date    Coronary artery disease involving native coronary artery of native heart without angina pectoris     Hyperlipidemia     Hypertension     Melanoma ([address] )     back, removed    Stented coronary artery      Social History     Socioeconomic History    Marital status: /Civil Union     Spouse name: Not on file    Number of children: Not on file    Years of education: Not on file    Highest education level: Not on file   Occupational History    Not on file   Social Needs    Financial resource strain: Not on file    Food insecurity:     Worry: Not on file     Inability: Not on file    Transportation needs:     Medical: Not on file     Non-medical: Not on file   Tobacco Use    Smoking status: Former Smoker    Smokeless tobacco: Former User   Substance and Sexual Activity    Alcohol use: No    Drug use: No    Sexual activity: Not on file   Lifestyle    Physical activity:     Days per week: Not on file     Minutes per session: Not on file    Stress: Not on file   Relationships    Social connections:     Talks on phone: Not on file     Gets together: Not on file     Attends Cheondoism service: Not on file     Active member of club or organization: Not on file     Attends meetings of clubs or organizations: Not on file     Relationship status: Not on file    Intimate partner violence:     Fear of current or ex partner: Not on file     Emotionally abused: Not on file     Physically abused: Not on file     Forced sexual activity: Not on file   Other Topics Concern    Not on file   Social History Narrative    Not on file      Family History   Problem Relation Age of Onset   [de-identified] ALS Mother     Prostate cancer Father     Pancreatic cancer Father     Breast cancer Sister     Arrhythmia Neg Hx     Clotting disorder Neg Hx     Fainting Neg Hx     Heart attack Neg Hx     Heart disease Neg Hx     Hypertension Neg Hx     Hyperlipidemia Neg Hx     Anuerysm Neg Hx     Stroke Neg Hx      Past Surgical History:   Procedure Laterality Date    BILATERAL VATS ABLATION      NERVE BLOCK Bilateral 12/28/2018    Procedure: L3 L4 L5 S1 Medical Branch Block #2 ([id number]); Surgeon: Varghese Blancas MD;  Location: [facility] MAIN OR;  Service: Pain Management     NERVE BLOCK Bilateral 12/21/2018    Procedure: L3 L4 L5 S1 Medial Branch Block #1 ([id number]);   Surgeon: Varghese Blancas MD;  Location: [facility] MAIN OR;  Service: Pain Management     RADIOFREQUENCY ABLATION Right 1/4/2019    Procedure: L3 L4 L5 S1 Radio Frequency Ablation;  Surgeon: Varghese Blancas MD;  [facility];  Service: Pain Management     RADIOFREQUENCY ABLATION Left 1/18/2019    Procedure: L3 L4 L5 S1 Radio Frequency Ablation;  Surgeon: Varghese Blancas MD;  Location: [facility] MAIN OR;  Service: Pain Management        Current Outpatient Medications:     aspirin 1 mg/mL SUSP, Take 162 mg by mouth , Disp: , Rfl:     atorvastatin (LIPITOR) 40 mg tablet, TAKE 1 TABLET BY MOUTH  EVERY DAY, Disp: 90 tablet, Rfl: 3    lisinopril-hydrochlorothiazide (PRINZIDE,ZESTORETIC) 10-12 5 MG per tablet, Take 1 tablet by mouth daily, Disp: 90 tablet, Rfl: 3    metoprolol succinate (TOPROL-XL) 25 mg 24 hr tablet, Take 1 tablet (25 mg total) by mouth 2 (two) times a day, Disp: 180 tablet, Rfl: 2    nitroglycerin (NITROSTAT) 0 4 mg SL tablet, Place 1 tablet under the tongue, Disp: , Rfl:   Allergies   Allergen Reactions    Seasonal Ic  [Cholestatin]        Labs:     Chemistry        Component Value Date/Time     03/07/2015 1123    K 4 9 02/26/2019 1113    K 4 6 03/07/2015 1123     02/26/2019 1113     03/07/2015 1123    CO2 31 02/26/2019 1113    CO2 31 03/07/2015 1123    BUN 22 02/26/2019 1113    BUN 22 03/07/2015 1123    CREATININE 1 03 02/26/2019 1113    CREATININE 0 92 03/07/2015 1123        Component Value Date/Time    CALCIUM 9 3 02/26/2019 1113    CALCIUM 9 5 03/07/2015 1123    ALKPHOS 87 02/26/2019 1113    ALKPHOS 81 07/13/2015 1108    AST 20 02/26/2019 1113    AST 34 07/13/2015 1108    ALT 34 02/26/2019 1113    ALT 14 07/13/2015 1108    BILITOT 1 3 (H) 07/13/2015 1108            Lab Results   Component Value Date    CHOL 107 07/13/2015    CHOL 105 02/05/2015     Lab Results   Component Value Date    HDL 41 02/26/2019    HDL 42 02/16/2018    HDL 44 02/27/2017     Lab Results   Component Value Date    LDLCALC 57 02/26/2019    LDLCALC 48 02/16/2018    LDLCALC 36 02/27/2017     Lab Results   Component Value Date    TRIG 85 02/26/2019    TRIG 105 02/16/2018    TRIG 137 02/27/2017     No results found for: CHOLHDL    Imaging: No results found  Review of Systems   Constitution: Negative  HENT: Negative  Eyes: Negative  Cardiovascular: Negative  Respiratory: Negative  Endocrine: Negative  Hematologic/Lymphatic: Negative  Skin: Negative  Musculoskeletal: Negative  Gastrointestinal: Negative  Genitourinary: Negative  Neurological: Negative  Psychiatric/Behavioral: Negative  Allergic/Immunologic: Negative  Vitals:    10/29/19 0937   BP: 118/76   Pulse: 84   SpO2: 94%           Physical Exam   Constitutional: He is oriented to person, place, and time  No distress     HENT:   Mouth/Throat: No oropharyngeal exudate  Eyes: No scleral icterus  Neck: No JVD present  Cardiovascular: Normal rate and regular rhythm  Exam reveals no friction rub  No murmur heard  Pulmonary/Chest: Effort normal and breath sounds normal  No stridor  No respiratory distress  He has no wheezes  Abdominal: Soft  Bowel sounds are normal  He exhibits no distension  There is no tenderness  There is no guarding  Musculoskeletal: He exhibits no edema  Neurological: He is alert and oriented to person, place, and time  Skin: Skin is warm and dry  He is not diaphoretic  Psychiatric: He has a normal mood and affect  His behavior is normal        Discussion/Summary:       1  Coronary Artery Disease: Overall stable  Continue medical therapy  LDL was 57  He has no angina       2  HTN: BP is stable on medical therapy  3  PVCs: We increased his metoprolol and he is asymptomatic  Check echo         The patient was counseled regarding diagnostic results, instructions for management, risk factor reductions, impressions  total time of encounter was 25 minutes and 15 minutes was spent counseling

## 2019-10-31 ENCOUNTER — HOSPITAL ENCOUNTER (OUTPATIENT)
Dept: NON INVASIVE DIAGNOSTICS | Facility: CLINIC | Age: 69
Discharge: HOME | End: 2019-10-31
Payer: MEDICARE

## 2019-10-31 DIAGNOSIS — I49.3 PVC (PREMATURE VENTRICULAR CONTRACTION): ICD-10-CM

## 2019-10-31 PROCEDURE — 93306 TTE W/DOPPLER COMPLETE: CPT

## 2019-10-31 PROCEDURE — 93306 TTE W/DOPPLER COMPLETE: CPT | Performed by: INTERNAL MEDICINE

## 2019-11-04 ENCOUNTER — TELEPHONE (OUTPATIENT)
Dept: CARDIOLOGY CLINIC | Facility: CLINIC | Age: 69
End: 2019-11-04

## 2019-11-04 NOTE — TELEPHONE ENCOUNTER
----- Message from Eula Roe sent at 11/1/2019  4:37 PM EDT -----      ----- Message -----  From: Martita Duarte MD  Sent: 11/1/2019   9:10 AM EDT  To: Cardiology [facility]

## 2019-11-06 ENCOUNTER — APPOINTMENT (EMERGENCY)
Dept: RADIOLOGY | Facility: HOSPITAL | Age: 69
DRG: 683 | End: 2019-11-06
Payer: MEDICARE

## 2019-11-06 ENCOUNTER — APPOINTMENT (INPATIENT)
Dept: ULTRASOUND IMAGING | Facility: HOSPITAL | Age: 69
DRG: 683 | End: 2019-11-06
Payer: MEDICARE

## 2019-11-06 ENCOUNTER — OFFICE VISIT (OUTPATIENT)
Dept: FAMILY MEDICINE CLINIC | Facility: CLINIC | Age: 69
End: 2019-11-06
Payer: MEDICARE

## 2019-11-06 ENCOUNTER — HOSPITAL ENCOUNTER (INPATIENT)
Facility: HOSPITAL | Age: 69
LOS: 3 days | Discharge: HOME | DRG: 683 | End: 2019-11-09
Attending: EMERGENCY MEDICINE | Admitting: HOSPITALIST
Payer: MEDICARE

## 2019-11-06 VITALS
HEART RATE: 86 BPM | OXYGEN SATURATION: 91 % | SYSTOLIC BLOOD PRESSURE: 122 MMHG | WEIGHT: 269 LBS | DIASTOLIC BLOOD PRESSURE: 80 MMHG | BODY MASS INDEX: 38.51 KG/M2 | RESPIRATION RATE: 18 BRPM | HEIGHT: 70 IN

## 2019-11-06 DIAGNOSIS — N17.9 AKI (ACUTE KIDNEY INJURY) (HCC): ICD-10-CM

## 2019-11-06 DIAGNOSIS — R06.00 DYSPNEA ON EXERTION: Primary | ICD-10-CM

## 2019-11-06 DIAGNOSIS — I25.10 CORONARY ARTERY DISEASE INVOLVING NATIVE HEART WITHOUT ANGINA PECTORIS, UNSPECIFIED VESSEL OR LESION TYPE: ICD-10-CM

## 2019-11-06 DIAGNOSIS — R06.02 SHORTNESS OF BREATH: Primary | ICD-10-CM

## 2019-11-06 DIAGNOSIS — I10 BENIGN ESSENTIAL HYPERTENSION: ICD-10-CM

## 2019-11-06 DIAGNOSIS — R53.1 WEAKNESS: ICD-10-CM

## 2019-11-06 DIAGNOSIS — N17.9 ACUTE RENAL FAILURE, UNSPECIFIED ACUTE RENAL FAILURE TYPE (HCC): ICD-10-CM

## 2019-11-06 PROBLEM — G89.29 CHRONIC BACK PAIN: Status: ACTIVE | Noted: 2018-12-17

## 2019-11-06 PROBLEM — M54.9 CHRONIC BACK PAIN: Status: ACTIVE | Noted: 2018-12-17

## 2019-11-06 PROBLEM — E87.5 HYPERKALEMIA: Status: ACTIVE | Noted: 2019-11-06

## 2019-11-06 PROBLEM — D72.829 LEUKOCYTOSIS: Status: ACTIVE | Noted: 2019-11-06

## 2019-11-06 PROBLEM — E87.1 HYPONATREMIA: Status: ACTIVE | Noted: 2019-11-06

## 2019-11-06 LAB
ALBUMIN SERPL BCP-MCNC: 2.6 G/DL (ref 3.5–5)
ALP SERPL-CCNC: 147 U/L (ref 46–116)
ALT SERPL W P-5'-P-CCNC: 80 U/L (ref 12–78)
ANION GAP SERPL CALCULATED.3IONS-SCNC: 10 MMOL/L (ref 4–13)
APTT PPP: 30 SECONDS (ref 23–37)
AST SERPL W P-5'-P-CCNC: 54 U/L (ref 5–45)
BACTERIA UR QL AUTO: ABNORMAL /HPF
BASOPHILS # BLD AUTO: 0.1 THOUSANDS/ΜL (ref 0–0.1)
BASOPHILS NFR BLD AUTO: 1 % (ref 0–1)
BILIRUB SERPL-MCNC: 0.6 MG/DL (ref 0.2–1)
BILIRUB UR QL STRIP: NEGATIVE
BUN SERPL-MCNC: 65 MG/DL (ref 5–25)
CALCIUM SERPL-MCNC: 8.9 MG/DL (ref 8.3–10.1)
CHLORIDE SERPL-SCNC: 97 MMOL/L (ref 100–108)
CLARITY UR: ABNORMAL
CO2 SERPL-SCNC: 26 MMOL/L (ref 21–32)
COLOR UR: YELLOW
CREAT SERPL-MCNC: 3.97 MG/DL (ref 0.6–1.3)
D DIMER PPP FEU-MCNC: 2.38 UG/ML FEU
EOSINOPHIL # BLD AUTO: 0.14 THOUSAND/ΜL (ref 0–0.61)
EOSINOPHIL NFR BLD AUTO: 1 % (ref 0–6)
ERYTHROCYTE [DISTWIDTH] IN BLOOD BY AUTOMATED COUNT: 12.9 % (ref 11.6–15.1)
GFR SERPL CREATININE-BSD FRML MDRD: 14 ML/MIN/1.73SQ M
GLUCOSE SERPL-MCNC: 87 MG/DL (ref 65–140)
GLUCOSE UR STRIP-MCNC: NEGATIVE MG/DL
HCT VFR BLD AUTO: 40.8 % (ref 36.5–49.3)
HGB BLD-MCNC: 13.1 G/DL (ref 12–17)
HGB UR QL STRIP.AUTO: ABNORMAL
IMM GRANULOCYTES # BLD AUTO: 0.26 THOUSAND/UL (ref 0–0.2)
IMM GRANULOCYTES NFR BLD AUTO: 2 % (ref 0–2)
INR PPP: 1.24 (ref 0.84–1.19)
KETONES UR STRIP-MCNC: NEGATIVE MG/DL
LEUKOCYTE ESTERASE UR QL STRIP: ABNORMAL
LYMPHOCYTES # BLD AUTO: 2.56 THOUSANDS/ΜL (ref 0.6–4.47)
LYMPHOCYTES NFR BLD AUTO: 18 % (ref 14–44)
MCH RBC QN AUTO: 30.2 PG (ref 26.8–34.3)
MCHC RBC AUTO-ENTMCNC: 32.1 G/DL (ref 31.4–37.4)
MCV RBC AUTO: 94 FL (ref 82–98)
MONOCYTES # BLD AUTO: 0.96 THOUSAND/ΜL (ref 0.17–1.22)
MONOCYTES NFR BLD AUTO: 7 % (ref 4–12)
NEUTROPHILS # BLD AUTO: 9.92 THOUSANDS/ΜL (ref 1.85–7.62)
NEUTS SEG NFR BLD AUTO: 71 % (ref 43–75)
NITRITE UR QL STRIP: NEGATIVE
NON-SQ EPI CELLS URNS QL MICRO: ABNORMAL /HPF
NRBC BLD AUTO-RTO: 0 /100 WBCS
NT-PROBNP SERPL-MCNC: 733 PG/ML
PH UR STRIP.AUTO: 5 [PH] (ref 4.5–8)
PLATELET # BLD AUTO: 491 THOUSANDS/UL (ref 149–390)
PMV BLD AUTO: 9.1 FL (ref 8.9–12.7)
POTASSIUM SERPL-SCNC: 5.6 MMOL/L (ref 3.5–5.3)
PROT SERPL-MCNC: 7 G/DL (ref 6.4–8.2)
PROT UR STRIP-MCNC: NEGATIVE MG/DL
PROTHROMBIN TIME: 14.9 SECONDS (ref 11.6–14.5)
RBC # BLD AUTO: 4.34 MILLION/UL (ref 3.88–5.62)
RBC #/AREA URNS AUTO: ABNORMAL /HPF
SODIUM SERPL-SCNC: 133 MMOL/L (ref 136–145)
SP GR UR STRIP.AUTO: 1.01 (ref 1–1.03)
TROPONIN I SERPL-MCNC: <0.02 NG/ML
TSH SERPL DL<=0.05 MIU/L-ACNC: 0.92 UIU/ML (ref 0.36–3.74)
URATE SERPL-MCNC: 9 MG/DL (ref 4.2–8)
UROBILINOGEN UR QL STRIP.AUTO: 0.2 E.U./DL
WBC # BLD AUTO: 13.94 THOUSAND/UL (ref 4.31–10.16)
WBC #/AREA URNS AUTO: ABNORMAL /HPF

## 2019-11-06 PROCEDURE — 99203 OFFICE O/P NEW LOW 30 MIN: CPT | Performed by: NURSE PRACTITIONER

## 2019-11-06 PROCEDURE — 36415 COLL VENOUS BLD VENIPUNCTURE: CPT | Performed by: PHYSICIAN ASSISTANT

## 2019-11-06 PROCEDURE — 83880 ASSAY OF NATRIURETIC PEPTIDE: CPT | Performed by: PHYSICIAN ASSISTANT

## 2019-11-06 PROCEDURE — 71046 X-RAY EXAM CHEST 2 VIEWS: CPT

## 2019-11-06 PROCEDURE — 84484 ASSAY OF TROPONIN QUANT: CPT | Performed by: PHYSICIAN ASSISTANT

## 2019-11-06 PROCEDURE — 99223 1ST HOSP IP/OBS HIGH 75: CPT | Performed by: NURSE PRACTITIONER

## 2019-11-06 PROCEDURE — 81001 URINALYSIS AUTO W/SCOPE: CPT

## 2019-11-06 PROCEDURE — 84550 ASSAY OF BLOOD/URIC ACID: CPT | Performed by: NURSE PRACTITIONER

## 2019-11-06 PROCEDURE — 84443 ASSAY THYROID STIM HORMONE: CPT | Performed by: NURSE PRACTITIONER

## 2019-11-06 PROCEDURE — 84300 ASSAY OF URINE SODIUM: CPT | Performed by: NURSE PRACTITIONER

## 2019-11-06 PROCEDURE — 83930 ASSAY OF BLOOD OSMOLALITY: CPT | Performed by: NURSE PRACTITIONER

## 2019-11-06 PROCEDURE — 87077 CULTURE AEROBIC IDENTIFY: CPT

## 2019-11-06 PROCEDURE — 85379 FIBRIN DEGRADATION QUANT: CPT | Performed by: NURSE PRACTITIONER

## 2019-11-06 PROCEDURE — 87186 SC STD MICRODIL/AGAR DIL: CPT

## 2019-11-06 PROCEDURE — 93005 ELECTROCARDIOGRAM TRACING: CPT

## 2019-11-06 PROCEDURE — 80053 COMPREHEN METABOLIC PANEL: CPT | Performed by: PHYSICIAN ASSISTANT

## 2019-11-06 PROCEDURE — 83935 ASSAY OF URINE OSMOLALITY: CPT | Performed by: NURSE PRACTITIONER

## 2019-11-06 PROCEDURE — 85025 COMPLETE CBC W/AUTO DIFF WBC: CPT | Performed by: PHYSICIAN ASSISTANT

## 2019-11-06 PROCEDURE — 85730 THROMBOPLASTIN TIME PARTIAL: CPT | Performed by: PHYSICIAN ASSISTANT

## 2019-11-06 PROCEDURE — 93970 EXTREMITY STUDY: CPT

## 2019-11-06 PROCEDURE — 87086 URINE CULTURE/COLONY COUNT: CPT

## 2019-11-06 PROCEDURE — 99285 EMERGENCY DEPT VISIT HI MDM: CPT

## 2019-11-06 PROCEDURE — 99285 EMERGENCY DEPT VISIT HI MDM: CPT | Performed by: PHYSICIAN ASSISTANT

## 2019-11-06 PROCEDURE — 85610 PROTHROMBIN TIME: CPT | Performed by: PHYSICIAN ASSISTANT

## 2019-11-06 RX ORDER — HEPARIN SODIUM 5000 [USP'U]/ML
5000 INJECTION, SOLUTION INTRAVENOUS; SUBCUTANEOUS EVERY 8 HOURS SCHEDULED
Status: DISCONTINUED | OUTPATIENT
Start: 2019-11-06 | End: 2019-11-09 | Stop reason: HOSPADM

## 2019-11-06 RX ORDER — SODIUM POLYSTYRENE SULFONATE 4.1 MEQ/G
30 POWDER, FOR SUSPENSION ORAL; RECTAL ONCE
Status: COMPLETED | OUTPATIENT
Start: 2019-11-06 | End: 2019-11-06

## 2019-11-06 RX ORDER — ONDANSETRON 2 MG/ML
4 INJECTION INTRAMUSCULAR; INTRAVENOUS EVERY 6 HOURS PRN
Status: DISCONTINUED | OUTPATIENT
Start: 2019-11-06 | End: 2019-11-09 | Stop reason: HOSPADM

## 2019-11-06 RX ORDER — ALBUTEROL SULFATE 2.5 MG/3ML
2.5 SOLUTION RESPIRATORY (INHALATION) ONCE
Status: DISCONTINUED | OUTPATIENT
Start: 2019-11-06 | End: 2019-11-06

## 2019-11-06 RX ORDER — ASPIRIN 81 MG/1
162 TABLET, CHEWABLE ORAL DAILY
Status: DISCONTINUED | OUTPATIENT
Start: 2019-11-07 | End: 2019-11-09 | Stop reason: HOSPADM

## 2019-11-06 RX ORDER — ATORVASTATIN CALCIUM 40 MG/1
40 TABLET, FILM COATED ORAL
Status: DISCONTINUED | OUTPATIENT
Start: 2019-11-07 | End: 2019-11-09 | Stop reason: HOSPADM

## 2019-11-06 RX ORDER — ACETAMINOPHEN 325 MG/1
650 TABLET ORAL EVERY 6 HOURS PRN
Status: DISCONTINUED | OUTPATIENT
Start: 2019-11-06 | End: 2019-11-09 | Stop reason: HOSPADM

## 2019-11-06 RX ORDER — SODIUM CHLORIDE 9 MG/ML
75 INJECTION, SOLUTION INTRAVENOUS CONTINUOUS
Status: DISCONTINUED | OUTPATIENT
Start: 2019-11-06 | End: 2019-11-08

## 2019-11-06 RX ORDER — METOPROLOL SUCCINATE 25 MG/1
25 TABLET, EXTENDED RELEASE ORAL 2 TIMES DAILY
Status: DISCONTINUED | OUTPATIENT
Start: 2019-11-06 | End: 2019-11-09 | Stop reason: HOSPADM

## 2019-11-06 RX ADMIN — SODIUM CHLORIDE 75 ML/HR: 0.9 INJECTION, SOLUTION INTRAVENOUS at 22:53

## 2019-11-06 RX ADMIN — METOPROLOL SUCCINATE 25 MG: 25 TABLET, EXTENDED RELEASE ORAL at 22:43

## 2019-11-06 RX ADMIN — SODIUM POLYSTYRENE SULFONATE 30 G: 1 POWDER ORAL; RECTAL at 22:44

## 2019-11-06 RX ADMIN — HEPARIN SODIUM 5000 UNITS: 5000 INJECTION INTRAVENOUS; SUBCUTANEOUS at 22:44

## 2019-11-06 RX ADMIN — SODIUM CHLORIDE 1000 ML: 0.9 INJECTION, SOLUTION INTRAVENOUS at 20:07

## 2019-11-06 NOTE — PROGRESS NOTES
Assessment/Plan:      Diagnoses and all orders for this visit:    Shortness of breath  -     Ambulatory Referral to Emergency Medicine; Future    Weakness  -     Ambulatory Referral to Emergency Medicine; Future    Coronary artery disease involving native heart without angina pectoris, unspecified vessel or lesion type  Patient follows up with cardiology  Benign essential hypertension  Patient follows up with cardiology  Patient is currently on metoprolol and lisinopril-HCTZ  Patient reports increased SOB for the past few weeks  Patient reports that it is getting worse  Denies any chest pain or palpitations  Denies any wheezing or cough  Patient appears SOB on exam  O2 sat 91%-94%  Lung sounds are decreased  Patient has CAD  Patient had a recent echo done which was normal  Patient referred to the [address] [place] [address] ER for further evaluation and treatment  Patient refuses an ambulance  Discussed importance of further evaluation and treatment at the ER  Patient reports that he will go immediately with his wife to the ER  Called and discussed case with ER physician  Subjective:     Patient ID: Robb Laboy is a 68 y o  male  Patient is here to establish care  Patient reports that he follows up with cardiology for HTN and CAD  Patient reports that he takes metoprolol and lisinopril-HCTZ for HTN  Patient reports that he also takes lipitor daily  Patient reports that he saw the cardiologist last week and had an echo done which was normal    Patient reports increased SOB for the past few weeks  Denies any fever, wheezing, or cough  Denies any chest pain or palpitations  Patient reports that he thinks he has an infection in his lungs  Patient also reports nasal congestion  Denies any sore throat, earache, vomiting, or diarrhea  Patient denies taking anything OTC for his symptoms  Patient reports that his SOB is getting worse  Patient reports that his SOB gets worse with even light exertion   Patient reports that he feels tired and weak  Review of Systems   Constitutional: Positive for fatigue  Negative for chills and fever  HENT:        As noted in HPI  Eyes: Negative for pain, discharge and redness  Respiratory:        As noted in HPI  Cardiovascular: Negative for chest pain and palpitations  Gastrointestinal: Negative for abdominal pain, diarrhea, nausea and vomiting  Genitourinary: Negative for dysuria, frequency, hematuria and urgency  Skin: Negative for rash  Neurological: Negative for dizziness, seizures, syncope, light-headedness and headaches  Psychiatric/Behavioral: Negative for suicidal ideas  Denies any depression  Objective:     Physical Exam   Constitutional: He is oriented to person, place, and time  Patient appears SOB  HENT:   Right Ear: External ear normal    Left Ear: External ear normal    Mouth/Throat: Oropharynx is clear and moist    Tympanic membranes and ear canals wnl  Clear nasal discharge noted  Eyes: Pupils are equal, round, and reactive to light  Conjunctivae are normal    Cardiovascular: Normal rate, regular rhythm and normal heart sounds  Radial pulses +2 bilaterally  Pulmonary/Chest: Tachypnea noted  He has decreased breath sounds  He has no wheezes  Abdominal: Soft  There is no tenderness  Musculoskeletal:   Gait wnl  Lymphadenopathy:     He has no cervical adenopathy  Neurological: He is alert and oriented to person, place, and time  Skin: No rash noted  Psychiatric: He has a normal mood and affect  Vitals reviewed

## 2019-11-06 NOTE — ED PROVIDER NOTES
History  Chief Complaint   Patient presents with    Shortness of Breath     Pt c/o dyspnea on exertion x2 weeks  Pt states he finally went to his PCP today because he wasn't getting better  Denies fevers  Denies coughing  Pt in no apparent distress at time of triage  78-year-old male presents to the emergency department with dyspnea on exertion  States that he has had progressive symptoms over the past 2 weeks  Was seen by his cardiologist last week for routine visit but states his symptoms are not very pronounced  Did have an echo after the appointment which showed an ejection fraction of 60%  States that since then he has had progression of symptoms and notes that he has difficulty maintaining his ADLs due to symptoms  He denies waking or swelling legs  No significant chest pain  Upon further questioning patient states that he does have ongoing back pain and difficulty sleeping at night  Has been taking 3+ tablets of alleve nightly for the past 6 months or so to help with symptoms  History provided by:  Patient   used: No        Prior to Admission Medications   Prescriptions Last Dose Informant Patient Reported? Taking?   aspirin 1 mg/mL SUSP 11/6/2019 at Unknown time Self Yes Yes   Sig: Take 162 mg by mouth     atorvastatin (LIPITOR) 40 mg tablet 11/6/2019 at Unknown time Self No Yes   Sig: TAKE 1 TABLET BY MOUTH  EVERY DAY   lisinopril-hydrochlorothiazide (PRINZIDE,ZESTORETIC) 10-12 5 MG per tablet 11/6/2019 at Unknown time Self No Yes   Sig: Take 1 tablet by mouth daily   metoprolol succinate (TOPROL-XL) 25 mg 24 hr tablet 11/6/2019 at Unknown time Self No Yes   Sig: Take 1 tablet (25 mg total) by mouth 2 (two) times a day   nitroglycerin (NITROSTAT) 0 4 mg SL tablet Not Taking at Unknown time Self Yes No   Sig: Place 1 tablet under the tongue      Facility-Administered Medications: None       Past Medical History:   Diagnosis Date    Arthritis     Coronary artery disease involving native coronary artery of native heart without angina pectoris     Edema, lower extremity     Last assessed 1/12/2015     Hyperlipidemia     Hypertension     Melanoma ([address] )     back, removed    Stented coronary artery        Past Surgical History:   Procedure Laterality Date    BILATERAL VATS ABLATION      CAROTID STENT      Transcath placement of intrathoracic carotid artery stent     MALIGNANT SKIN LESION EXCISION      Trunk     NERVE BLOCK Bilateral 12/28/2018    Procedure: L3 L4 L5 S1 Medical Branch Block #2 ([id number]); Surgeon: Ishmael Mckeon MD;  Location: [facility] MAIN OR;  Service: Pain Management     NERVE BLOCK Bilateral 12/21/2018    Procedure: L3 L4 L5 S1 Medial Branch Block #1 ([id number]); Surgeon: Ishmael Mckeon MD;  Location: [facility] MAIN OR;  Service: Pain Management     RADIOFREQUENCY ABLATION Right 1/4/2019    Procedure: L3 L4 L5 S1 Radio Frequency Ablation;  Surgeon: Ishmael Mckeon MD;  Location: [facility] MAIN OR;  Service: Pain Management     RADIOFREQUENCY ABLATION Left 1/18/2019    Procedure: L3 L4 L5 S1 Radio Frequency Ablation;  Surgeon: Ishmael Mckeon MD;  Location: [facility] MAIN OR;  Service: Pain Management     SENTINEL LYMPH NODE BIOPSY         Family History   Problem Relation Age of Onset   Michael Burks ALS Mother     Prostate cancer Father     Pancreatic cancer Father     Colon cancer Father     Breast cancer Sister     Arrhythmia Neg Hx     Clotting disorder Neg Hx     Fainting Neg Hx     Heart attack Neg Hx     Heart disease Neg Hx     Hypertension Neg Hx     Hyperlipidemia Neg Hx     Anuerysm Neg Hx     Stroke Neg Hx      I have reviewed and agree with the history as documented  Social History     Tobacco Use    Smoking status: Former Smoker    Smokeless tobacco: Former User   Substance Use Topics    Alcohol use: No    Drug use: No        Review of Systems   Constitutional: Negative for activity change, appetite change, chills and fever  HENT: Negative for congestion, dental problem, drooling, ear discharge, ear pain, mouth sores, nosebleeds, rhinorrhea, sore throat and trouble swallowing  Eyes: Negative for pain, discharge and itching  Respiratory: Positive for shortness of breath  Negative for cough, chest tightness and wheezing  Cardiovascular: Negative for chest pain and palpitations  Gastrointestinal: Negative for abdominal pain, blood in stool, constipation, diarrhea, nausea and vomiting  Endocrine: Negative for cold intolerance and heat intolerance  Genitourinary: Negative for difficulty urinating, dysuria, flank pain, frequency and urgency  Skin: Negative for rash and wound  Allergic/Immunologic: Negative for food allergies and immunocompromised state  Neurological: Negative for dizziness, seizures, syncope, weakness, numbness and headaches  Psychiatric/Behavioral: Negative for agitation, behavioral problems and confusion  Physical Exam  Physical Exam   Constitutional: He is oriented to person, place, and time  He appears well-developed and well-nourished  No distress  HENT:   Head: Normocephalic and atraumatic  Right Ear: External ear normal    Left Ear: External ear normal    Mouth/Throat: Oropharynx is clear and moist  No oropharyngeal exudate  Eyes: Conjunctivae are normal    Neck: No JVD present  No tracheal deviation present  Cardiovascular: Normal rate and regular rhythm  Exam reveals no gallop and no friction rub  No murmur heard  Pulmonary/Chest: Effort normal  No respiratory distress  He has no wheezes  He has no rales  He exhibits no tenderness  Abdominal: Soft  Bowel sounds are normal  He exhibits no distension  There is no tenderness  There is no guarding  Musculoskeletal: Normal range of motion  He exhibits no edema, tenderness or deformity  Lymphadenopathy:     He has no cervical adenopathy  Neurological: He is alert and oriented to person, place, and time     Skin: Skin is warm and dry  No rash noted  He is not diaphoretic  No erythema  Psychiatric: He has a normal mood and affect  Nursing note and vitals reviewed        Vital Signs  ED Triage Vitals   Temperature Pulse Respirations Blood Pressure SpO2   11/06/19 1520 11/06/19 1520 11/06/19 1520 11/06/19 1520 11/06/19 1520   (!) 97 4 °F (36 3 °C) 90 20 113/61 96 %      Temp Source Heart Rate Source Patient Position - Orthostatic VS BP Location FiO2 (%)   11/06/19 1520 11/06/19 1520 11/06/19 1520 11/06/19 1520 --   Oral Monitor Sitting Right arm       Pain Score       11/06/19 2004       No Pain           Vitals:    11/06/19 2056 11/07/19 0326 11/07/19 0519 11/07/19 0700   BP: 139/67 (!) 84/42 127/59 114/65   Pulse: 67 (!) 43 91 62   Patient Position - Orthostatic VS: Sitting Lying Sitting Lying         Visual Acuity      ED Medications  Medications   atorvastatin (LIPITOR) tablet 40 mg (has no administration in time range)   metoprolol succinate (TOPROL-XL) 24 hr tablet 25 mg (25 mg Oral Given 11/6/19 2243)   sodium chloride 0 9 % infusion (75 mL/hr Intravenous New Bag 11/6/19 2253)   ondansetron (ZOFRAN) injection 4 mg (has no administration in time range)   heparin (porcine) subcutaneous injection 5,000 Units (5,000 Units Subcutaneous Given 11/7/19 0549)   acetaminophen (TYLENOL) tablet 650 mg (has no administration in time range)   aspirin chewable tablet 162 mg (has no administration in time range)   sodium chloride 0 9 % bolus 1,000 mL (1,000 mL Intravenous New Bag 11/6/19 2007)   sodium polystyrene (KAYEXALATE) powder 30 g (30 g Oral Given 11/6/19 2244)       Diagnostic Studies  Results Reviewed     Procedure Component Value Units Date/Time    Sodium, urine, random [[id number]] Collected:  11/06/19 2012    Lab Status:  Final result Specimen:  Urine, Clean Catch Updated:  11/07/19 0540     Sodium, Ur 53    D-dimer, quantitative [[id number]]  (Abnormal) Collected:  11/06/19 1605    Lab Status:  Final result Specimen:  Blood from Arm, Right Updated:  11/06/19 2201     D-Dimer, Quant 2 38 ug/ml FEU     TSH, 3rd generation [[id number]]  (Normal) Collected:  11/06/19 1658    Lab Status:  Final result Specimen:  Blood from Arm, Right Updated:  11/06/19 2156     TSH 3RD GENERATON 0 918 uIU/mL     Narrative:       Patients undergoing fluorescein dye angiography may retain small amounts of fluorescein in the body for 48-72 hours post procedure  Samples containing fluorescein can produce falsely depressed TSH values  If the patient had this procedure,a specimen should be resubmitted post fluorescein clearance  Uric acid [[id number]]  (Abnormal) Collected:  11/06/19 1658    Lab Status:  Final result Specimen:  Blood from Arm, Right Updated:  11/06/19 2156     Uric Acid 9 0 mg/dL     Osmolality, urine [[id number]] Collected:  11/06/19 2012    Lab Status: In process Specimen:  Urine, Clean Catch Updated:  11/06/19 2152    Urine Microscopic [[id number]]  (Abnormal) Collected:  11/06/19 2012    Lab Status:  Final result Specimen:  Urine, Clean Catch Updated:  11/06/19 2038     RBC, UA None Seen /hpf      WBC, UA Innumerable /hpf      Epithelial Cells Occasional /hpf      Bacteria, UA Moderate /hpf     Urine culture [[id number]] Collected:  11/06/19 2012    Lab Status:   In process Specimen:  Urine, Clean Catch Updated:  11/06/19 2038    ED Urine Macroscopic [[id number]]  (Abnormal) Collected:  11/06/19 2012    Lab Status:  Final result Specimen:  Urine Updated:  11/06/19 2014     Color, UA Yellow     Clarity, UA Cloudy     pH, UA 5 0     Leukocytes, UA Small     Nitrite, UA Negative     Protein, UA Negative mg/dl      Glucose, UA Negative mg/dl      Ketones, UA Negative mg/dl      Urobilinogen, UA 0 2 E U /dl      Bilirubin, UA Negative     Blood, UA Trace     Specific Shepherd, UA 1 015    Narrative:       CLINITEK RESULT    Comprehensive metabolic panel [[id number]]  (Abnormal) Collected:  11/06/19 1658    Lab Status:  Final result Specimen:  Blood from Arm, Right Updated:  11/06/19 1718     Sodium 133 mmol/L      Potassium 5 6 mmol/L      Chloride 97 mmol/L      CO2 26 mmol/L      ANION GAP 10 mmol/L      BUN 65 mg/dL      Creatinine 3 97 mg/dL      Glucose 87 mg/dL      Calcium 8 9 mg/dL      AST 54 U/L      ALT 80 U/L      Alkaline Phosphatase 147 U/L      Total Protein 7 0 g/dL      Albumin 2 6 g/dL      Total Bilirubin 0 60 mg/dL      eGFR 14 ml/min/1 73sq m     Narrative:       [place] guidelines for Chronic Kidney Disease (CKD):     Stage 1 with normal or high GFR (GFR > 90 mL/min/1 73 square meters)    Stage 2 Mild CKD (GFR = 60-89 mL/min/1 73 square meters)    Stage 3A Moderate CKD (GFR = 45-59 mL/min/1 73 square meters)    Stage 3B Moderate CKD (GFR = 30-44 mL/min/1 73 square meters)    Stage 4 Severe CKD (GFR = 15-29 mL/min/1 73 square meters)    Stage 5 End Stage CKD (GFR <15 mL/min/1 73 square meters)  Note: GFR calculation is accurate only with a steady state creatinine    NT-BNP PRO [[id number]]  (Abnormal) Collected:  11/06/19 1605    Lab Status:  Final result Specimen:  Blood from Arm, Right Updated:  11/06/19 1638     NT-proBNP 733 pg/mL     Troponin I [[id number]]  (Normal) Collected:  11/06/19 1605    Lab Status:  Final result Specimen:  Blood from Arm, Right Updated:  11/06/19 1631     Troponin I <0 02 ng/mL     Protime-INR [[id number]]  (Abnormal) Collected:  11/06/19 1605    Lab Status:  Final result Specimen:  Blood from Arm, Right Updated:  11/06/19 1628     Protime 14 9 seconds      INR 1 24    APTT [[id number]]  (Normal) Collected:  11/06/19 1605    Lab Status:  Final result Specimen:  Blood from Arm, Right Updated:  11/06/19 1628     PTT 30 seconds     CBC and differential [[id number]]  (Abnormal) Collected:  11/06/19 1605    Lab Status:  Final result Specimen:  Blood from Arm, Right Updated:  11/06/19 1613     WBC 13 94 Thousand/uL      RBC 4 34 Million/uL      Hemoglobin 13 1 g/dL      Hematocrit 40 8 %      MCV 94 fL      MCH 30 2 pg      MCHC 32 1 g/dL      RDW 12 9 %      MPV 9 1 fL      Platelets 603 Thousands/uL      nRBC 0 /100 WBCs      Neutrophils Relative 71 %      Immat GRANS % 2 %      Lymphocytes Relative 18 %      Monocytes Relative 7 %      Eosinophils Relative 1 %      Basophils Relative 1 %      Neutrophils Absolute 9 92 Thousands/µL      Immature Grans Absolute 0 26 Thousand/uL      Lymphocytes Absolute 2 56 Thousands/µL      Monocytes Absolute 0 96 Thousand/µL      Eosinophils Absolute 0 14 Thousand/µL      Basophils Absolute 0 10 Thousands/µL                  XR chest 2 views   Final Result by Ade Lynch MD (11/06 9292)      Small lung volumes with subsegmental atelectasis right middle lobe  Workstation performed: [id number]         CT chest wo contrast    (Results Pending)   VAS lower limb venous duplex study, complete bilateral    (Results Pending)              Procedures  ECG 12 Lead Documentation Only  Date/Time: 11/6/2019 5:15 PM  Performed by: Adam Coles PA-C  Authorized by: Adam Coles PA-C     Indications / Diagnosis:  SOB  ECG reviewed by me, the ED Provider: yes    Patient location:  ED  Previous ECG:     Previous ECG:  Compared to current    Comparison ECG info:  2/4/15    Similarity:  No change  Interpretation:     Interpretation: normal    Rate:     ECG rate:  72    ECG rate assessment: normal    Rhythm:     Rhythm: sinus rhythm    Ectopy:     Ectopy: PAC    QRS:     QRS axis:  Normal    QRS intervals:  Normal  Conduction:     Conduction: normal    ST segments:     ST segments:  Normal  T waves:     T waves: normal             ED Course  ED Course as of Nov 07 0843   Wed Nov 06, 2019   1727 Discussed lab results with patient  No history of renal failure  States that he does feel he has been urinating and emptying his has bladder normally                                    MDM  Number of Diagnoses or Management Options  Acute renal failure, unspecified acute renal failure type [facility]):   Dyspnea on exertion:   Diagnosis management comments: Differential diagnosis includes but not limited to:  Congestive heart failure, acute coronary syndrome, renal failure         Amount and/or Complexity of Data Reviewed  Clinical lab tests: ordered and reviewed  Tests in the radiology section of CPT®: ordered and reviewed  Independent visualization of images, tracings, or specimens: yes        Disposition  Final diagnoses:   Dyspnea on exertion   Acute renal failure, unspecified acute renal failure type ([address] )     Time reflects when diagnosis was documented in both MDM as applicable and the Disposition within this note     Time User Action Codes Description Comment    11/6/2019  6:16 PM Elio Tylerley Add [R06 09] Dyspnea on exertion     11/6/2019  6:16 PM Sara Klein Add [N17 9] Acute renal failure, unspecified acute renal failure type [facility])       ED Disposition     ED Disposition Condition Date/Time Comment    Admit Stable Wed Nov 6, 2019  6:16 PM Case was discussed with MANNIE and the patient's admission status was agreed to be Admission Status: inpatient status to the service of Dr Miguel Most   Follow-up Information    None         Current Discharge Medication List      CONTINUE these medications which have NOT CHANGED    Details   aspirin 1 mg/mL SUSP Take 162 mg by mouth        atorvastatin (LIPITOR) 40 mg tablet TAKE 1 TABLET BY MOUTH  EVERY DAY  Qty: 90 tablet, Refills: 3    Associated Diagnoses: CAD (coronary artery disease)      lisinopril-hydrochlorothiazide (PRINZIDE,ZESTORETIC) 10-12 5 MG per tablet Take 1 tablet by mouth daily  Qty: 90 tablet, Refills: 3    Associated Diagnoses: Hypertension, unspecified type      metoprolol succinate (TOPROL-XL) 25 mg 24 hr tablet Take 1 tablet (25 mg total) by mouth 2 (two) times a day  Qty: 180 tablet, Refills: 2    Associated Diagnoses: Coronary artery disease involving native coronary artery of native heart without angina pectoris nitroglycerin (NITROSTAT) 0 4 mg SL tablet Place 1 tablet under the tongue           No discharge procedures on file      ED Provider  Electronically Signed by           Zana Cabrales PA-C  11/07/19 9270

## 2019-11-07 ENCOUNTER — APPOINTMENT (INPATIENT)
Dept: CT IMAGING | Facility: HOSPITAL | Age: 69
DRG: 683 | End: 2019-11-07
Payer: MEDICARE

## 2019-11-07 LAB
ANION GAP SERPL CALCULATED.3IONS-SCNC: 9 MMOL/L (ref 4–13)
ATRIAL RATE: 70 BPM
BASOPHILS # BLD AUTO: 0.07 THOUSANDS/ΜL (ref 0–0.1)
BASOPHILS NFR BLD AUTO: 1 % (ref 0–1)
BUN SERPL-MCNC: 61 MG/DL (ref 5–25)
CALCIUM SERPL-MCNC: 8.9 MG/DL (ref 8.3–10.1)
CHLORIDE SERPL-SCNC: 99 MMOL/L (ref 100–108)
CO2 SERPL-SCNC: 27 MMOL/L (ref 21–32)
CORTIS AM PEAK SERPL-MCNC: 18.3 UG/DL (ref 4.2–22.4)
CREAT SERPL-MCNC: 3.3 MG/DL (ref 0.6–1.3)
EOSINOPHIL # BLD AUTO: 0.24 THOUSAND/ΜL (ref 0–0.61)
EOSINOPHIL NFR BLD AUTO: 2 % (ref 0–6)
ERYTHROCYTE [DISTWIDTH] IN BLOOD BY AUTOMATED COUNT: 12.9 % (ref 11.6–15.1)
GFR SERPL CREATININE-BSD FRML MDRD: 18 ML/MIN/1.73SQ M
GLUCOSE SERPL-MCNC: 81 MG/DL (ref 65–140)
HCT VFR BLD AUTO: 38.1 % (ref 36.5–49.3)
HGB BLD-MCNC: 12.3 G/DL (ref 12–17)
IMM GRANULOCYTES # BLD AUTO: 0.29 THOUSAND/UL (ref 0–0.2)
IMM GRANULOCYTES NFR BLD AUTO: 2 % (ref 0–2)
LYMPHOCYTES # BLD AUTO: 2.92 THOUSANDS/ΜL (ref 0.6–4.47)
LYMPHOCYTES NFR BLD AUTO: 23 % (ref 14–44)
MAGNESIUM SERPL-MCNC: 1.9 MG/DL (ref 1.6–2.6)
MCH RBC QN AUTO: 30.5 PG (ref 26.8–34.3)
MCHC RBC AUTO-ENTMCNC: 32.3 G/DL (ref 31.4–37.4)
MCV RBC AUTO: 95 FL (ref 82–98)
MONOCYTES # BLD AUTO: 0.97 THOUSAND/ΜL (ref 0.17–1.22)
MONOCYTES NFR BLD AUTO: 8 % (ref 4–12)
NEUTROPHILS # BLD AUTO: 8.18 THOUSANDS/ΜL (ref 1.85–7.62)
NEUTS SEG NFR BLD AUTO: 64 % (ref 43–75)
NRBC BLD AUTO-RTO: 0 /100 WBCS
OSMOLALITY UR/SERPL-RTO: 296 MMOL/KG (ref 282–298)
OSMOLALITY UR: 369 MMOL/KG
P AXIS: 33 DEGREES
PLATELET # BLD AUTO: 414 THOUSANDS/UL (ref 149–390)
PMV BLD AUTO: 8.8 FL (ref 8.9–12.7)
POTASSIUM SERPL-SCNC: 5.3 MMOL/L (ref 3.5–5.3)
PR INTERVAL: 166 MS
QRS AXIS: 53 DEGREES
QRSD INTERVAL: 88 MS
QT INTERVAL: 402 MS
QTC INTERVAL: 434 MS
RBC # BLD AUTO: 4.03 MILLION/UL (ref 3.88–5.62)
SODIUM 24H UR-SCNC: 53 MOL/L
SODIUM SERPL-SCNC: 135 MMOL/L (ref 136–145)
T WAVE AXIS: 67 DEGREES
VENTRICULAR RATE: 70 BPM
WBC # BLD AUTO: 12.67 THOUSAND/UL (ref 4.31–10.16)

## 2019-11-07 PROCEDURE — 80048 BASIC METABOLIC PNL TOTAL CA: CPT | Performed by: INTERNAL MEDICINE

## 2019-11-07 PROCEDURE — 71250 CT THORAX DX C-: CPT

## 2019-11-07 PROCEDURE — 85025 COMPLETE CBC W/AUTO DIFF WBC: CPT | Performed by: INTERNAL MEDICINE

## 2019-11-07 PROCEDURE — 93010 ELECTROCARDIOGRAM REPORT: CPT | Performed by: INTERNAL MEDICINE

## 2019-11-07 PROCEDURE — 93970 EXTREMITY STUDY: CPT | Performed by: SURGERY

## 2019-11-07 PROCEDURE — 83735 ASSAY OF MAGNESIUM: CPT | Performed by: INTERNAL MEDICINE

## 2019-11-07 PROCEDURE — 93005 ELECTROCARDIOGRAM TRACING: CPT

## 2019-11-07 PROCEDURE — 99233 SBSQ HOSP IP/OBS HIGH 50: CPT | Performed by: FAMILY MEDICINE

## 2019-11-07 PROCEDURE — 82533 TOTAL CORTISOL: CPT | Performed by: INTERNAL MEDICINE

## 2019-11-07 RX ADMIN — HEPARIN SODIUM 5000 UNITS: 5000 INJECTION INTRAVENOUS; SUBCUTANEOUS at 22:38

## 2019-11-07 RX ADMIN — ATORVASTATIN CALCIUM 40 MG: 40 TABLET, FILM COATED ORAL at 09:34

## 2019-11-07 RX ADMIN — ASPIRIN 81 MG 162 MG: 81 TABLET ORAL at 09:33

## 2019-11-07 RX ADMIN — METOPROLOL SUCCINATE 25 MG: 25 TABLET, EXTENDED RELEASE ORAL at 22:39

## 2019-11-07 RX ADMIN — HEPARIN SODIUM 5000 UNITS: 5000 INJECTION INTRAVENOUS; SUBCUTANEOUS at 14:05

## 2019-11-07 RX ADMIN — SODIUM CHLORIDE 75 ML/HR: 0.9 INJECTION, SOLUTION INTRAVENOUS at 11:56

## 2019-11-07 RX ADMIN — HEPARIN SODIUM 5000 UNITS: 5000 INJECTION INTRAVENOUS; SUBCUTANEOUS at 05:49

## 2019-11-07 NOTE — ASSESSMENT & PLAN NOTE
· Patient reports he was taking NSAIDs 4-6 doses throughout the day and Motrin P M  At bedtime for chronic back pain over the past several weeks  · Counseled to stop NSAID use  · He denies pain at this time  · Tylenol and aqua K for pain control  · Continue to monitor

## 2019-11-07 NOTE — H&P
[facility]    H&P- Angel Beasley 1950, 68 y o  male MRN: [id number]    Unit/Bed#: S -01 Encounter: [id number]    Primary Care Provider: Elsy Camarillo DO   Date and time admitted to hospital: 11/6/2019  3:16 PM        * Shortness of breath  Assessment & Plan  · Patient reports over 2 weeks of shortness of breath  He went to his outpatient PCP today, at which point they referred him to the ER for further workup  He reports that the shortness of breath is worse with exertion  He denies chest pain, chest tightness, cough, fever, chills  · On admission, chest x-ray: Small lung volumes with subsegmental atelectasis right middle lobe  · NT-proBNP 733  · Per ER nurse documentation, patient's oxygen saturation decreased to 80% on room air with ambulation  Patient oxygen saturation recovered to 96% on room air once he was returned to sitting at rest   · Will obtain CT of the chest without contrast  · Will obtain D-dimer, however this is likely to be elevated in the setting of acute renal injury  · Will obtain bilateral duplex to rule out DVT  · Telemetry monitoring  · Unable to obtain PE study at this time due to patient's current kidney function  · Will continue to monitor patient's symptoms  NIECY (acute kidney injury) ([facility] )  Assessment & Plan  · Patient reports daily use of NSAIDs, about 6-8 pills daily, due to chronic back pain  Counseled on cessation of NSAIDs  · Present on admission, creatinine of 3 97, baseline creatinine 0 9  · Will hold lisinopril-hydrochlorothiazide secondary to NIECY  · Start gentle IV hydration  · Repeat BMP in A M  Hyponatremia  Assessment & Plan  · Present on admission, sodium of 133  · Appears acute  · Will obtain TSH, osmolality urine, sodium urine, uric acid and cortisol level  · Will start gentle IV hydration  · Repeat BMP in a m  Hyperkalemia  Assessment & Plan  · Present on admission    · Potassium of 5 6  · Kayexalate ordered  · Repeat BMP in a m       Leukocytosis  Assessment & Plan  · Present on admission, WBC 13 94   · Patient is afebrile  Denies any fever or chills at home  · Urine microscopic:  WBC is innumerable, moderate bacteria  Urine cultures pending  · Chest x-ray: Small lung volumes with subsegmental atelectasis right middle lobe  · Will continue to monitor  Repeat CBC in a m  Coronary artery disease  Assessment & Plan  · Patient originally presented to his PCP's office today with complaints of shortness of breath  · Follows with Dr Matt Chavarria of Cardiology outpatient, last visit of 10/29/2019  · Echo from 27/62/9796 indicated systolic function was normal   Ejection fraction was estimated to be 60%  There were regional wall motion abnormalities  · Continue ASA, Lipitor, Toprol-XL  · Will hold home medication of lisinopril-hydrochlorothiazide in the setting of NIECY  · Continue to monitor  Chronic back pain  Assessment & Plan  · Patient reports he was taking NSAIDs 4-6 doses throughout the day and Motrin P M  At bedtime for chronic back pain over the past several weeks  · Counseled to stop NSAID use  · He denies pain at this time  · Tylenol and aqua K for pain control  · Continue to monitor  Benign essential hypertension  Assessment & Plan  · Home regimen of lisinopril-hydrochlorothiazide and and Toprol-XL  · Blood pressure is currently well controlled in the 130s/60s  · Continue Toprol XL  · Will hold lisinopril hydrochlorothiazide at this time due to NIECY  · Continue to monitor  VTE Prophylaxis: Heparin  / sequential compression device   Code Status: Full  POLST: POLST form is not discussed and not completed at this time  Discussion with family: Girlfriend at bedside    Anticipated Length of Stay:  Patient will be admitted on an Inpatient basis with an anticipated length of stay of  > 2 midnights     Justification for Hospital Stay:  NIECY, PE Workup, hyperkalemia, hyponatremia    Total Time for Visit, including Counseling / Coordination of Care: 1 hour  Greater than 50% of this total time spent on direct patient counseling and coordination of care  Chief Complaint:   Shortness of breath    History of Present Illness:    Mainor Pastrana is a 68 y o  male with a past medical history of CAD with stent placement over 10 years ago, hypertension, and chronic low back pain who presents with shortness of breath  Patient reports over 2 weeks of shortness of breath  He went to his outpatient PCP today, at which point they referred him to the ER for further workup  He reports that the shortness of breath is worse with exertion  He denies it being worse when lying down  He denies chest pain, chest tightness, cough, fever, chills  He reports regular use of NSAIDs for chronic back pain  Review of Systems:    Review of Systems   Constitutional: Negative for chills, fatigue and fever  Respiratory: Positive for shortness of breath  Negative for cough, chest tightness and wheezing  Cardiovascular: Negative for chest pain, palpitations and leg swelling  Gastrointestinal: Negative for abdominal pain, constipation, diarrhea, nausea and vomiting  Genitourinary: Positive for decreased urine volume  Negative for difficulty urinating, dysuria, flank pain and frequency  Musculoskeletal: Negative for arthralgias, back pain and myalgias  Neurological: Negative for dizziness, syncope, weakness, light-headedness, numbness and headaches  Psychiatric/Behavioral: Negative for confusion         Past Medical and Surgical History:     Past Medical History:   Diagnosis Date    Arthritis     Coronary artery disease involving native coronary artery of native heart without angina pectoris     Edema, lower extremity     Last assessed 1/12/2015     Hyperlipidemia     Hypertension     Melanoma ([address] )     back, removed    Stented coronary artery        Past Surgical History:   Procedure Laterality Date    BILATERAL VATS ABLATION      CAROTID STENT      Transcath placement of intrathoracic carotid artery stent     MALIGNANT SKIN LESION EXCISION      Trunk     NERVE BLOCK Bilateral 12/28/2018    Procedure: L3 L4 L5 S1 Medical Branch Block #2 ([id number]); Surgeon: Tiny Romberg, MD;  Location: [facility] MAIN OR;  Service: Pain Management     NERVE BLOCK Bilateral 12/21/2018    Procedure: L3 L4 L5 S1 Medial Branch Block #1 ([id number]); Surgeon: Tiny Romberg, MD;  Location: [facility] MAIN OR;  Service: Pain Management     RADIOFREQUENCY ABLATION Right 1/4/2019    Procedure: L3 L4 L5 S1 Radio Frequency Ablation;  Surgeon: Tiny Romberg, MD;  Location: [place] [address] MAIN OR;  Service: Pain Management     RADIOFREQUENCY ABLATION Left 1/18/2019    Procedure: L3 L4 L5 S1 Radio Frequency Ablation;  Surgeon: Tiny Romberg, MD;  Location: [place] [address] MAIN OR;  Service: Pain Management     SENTINEL LYMPH NODE BIOPSY         Meds/Allergies:    Prior to Admission medications    Medication Sig Start Date End Date Taking? Authorizing Provider   aspirin 1 mg/mL SUSP Take 162 mg by mouth  Yes Historical Provider, MD   atorvastatin (LIPITOR) 40 mg tablet TAKE 1 TABLET BY MOUTH  EVERY DAY 9/13/19  Yes Rober Crowe MD   lisinopril-hydrochlorothiazide (PRINZIDE,ZESTORETIC) 10-12 5 MG per tablet Take 1 tablet by mouth daily 9/13/19  Yes Rober Crowe MD   metoprolol succinate (TOPROL-XL) 25 mg 24 hr tablet Take 1 tablet (25 mg total) by mouth 2 (two) times a day 4/19/19  Yes Rober Crowe MD   nitroglycerin (NITROSTAT) 0 4 mg SL tablet Place 1 tablet under the tongue 1/26/15   Historical Provider, MD     I have reviewed home medications with patient personally  Allergies:    Allergies   Allergen Reactions    Seasonal Ic  [Cholestatin]        Social History:     Marital Status: /Civil Union   Occupation:  Unknown  Patient Pre-hospital Living Situation:  With girlfriend  Patient Pre-hospital Level of Mobility:  Independent  Patient Pre-hospital Diet Restrictions:  Cardiac  Substance Use History:   Social History     Substance and Sexual Activity   Alcohol Use No     Social History     Tobacco Use   Smoking Status Former Smoker   Smokeless Tobacco Former User     Social History     Substance and Sexual Activity   Drug Use No       Family History:    non-contributory    Physical Exam:     Vitals:   Blood Pressure: 139/67 (11/06/19 2056)  Pulse: 67 (11/06/19 2056)  Temperature: 97 7 °F (36 5 °C) (11/06/19 2056)  Temp Source: Oral (11/06/19 2056)  Respirations: 16 (11/06/19 2056)  SpO2: 99 % (11/06/19 2056)    Physical Exam   Constitutional: He is oriented to person, place, and time  He appears well-developed  Obese   HENT:   Head: Normocephalic and atraumatic  Eyes: Conjunctivae are normal    Neck: Normal range of motion  Neck supple  Cardiovascular: Normal rate, regular rhythm, normal heart sounds and intact distal pulses  No murmur heard  Pulmonary/Chest: Effort normal  No respiratory distress  He has decreased breath sounds  He has no wheezes  He has no rales  Abdominal: Soft  Bowel sounds are normal  There is no tenderness  Musculoskeletal: Normal range of motion  He exhibits no edema or tenderness  Neurological: He is oriented to person, place, and time  Skin: Skin is warm and dry  No rash noted  No erythema  No pallor  Psychiatric: He has a normal mood and affect  His behavior is normal  Judgment and thought content normal    Nursing note and vitals reviewed  Additional Data:     Lab Results: I have personally reviewed pertinent reports        Results from last 7 days   Lab Units 11/06/19  1605   WBC Thousand/uL 13 94*   HEMOGLOBIN g/dL 13 1   HEMATOCRIT % 40 8   PLATELETS Thousands/uL 491*   NEUTROS PCT % 71   LYMPHS PCT % 18   MONOS PCT % 7   EOS PCT % 1     Results from last 7 days   Lab Units 11/06/19  1658   SODIUM mmol/L 133*   POTASSIUM mmol/L 5 6*   CHLORIDE mmol/L 97*   CO2 mmol/L 26   BUN mg/dL 65* CREATININE mg/dL 3 97*   ANION GAP mmol/L 10   CALCIUM mg/dL 8 9   ALBUMIN g/dL 2 6*   TOTAL BILIRUBIN mg/dL 0 60   ALK PHOS U/L 147*   ALT U/L 80*   AST U/L 54*   GLUCOSE RANDOM mg/dL 87     Results from last 7 days   Lab Units 11/06/19  1605   INR  1 24*                   Imaging: I have personally reviewed pertinent reports  XR chest 2 views   Final Result by Alfred Rios MD (11/06 8539)      Small lung volumes with subsegmental atelectasis right middle lobe  Workstation performed: [id number]         CT chest wo contrast    (Results Pending)   VAS lower limb venous duplex study, complete bilateral    (Results Pending)       EKG, Pathology, and Other Studies Reviewed on Admission:   · EKG: NSR    Allscripts / Epic Records Reviewed: Yes     ** Please Note: This note has been constructed using a voice recognition system   **

## 2019-11-07 NOTE — ASSESSMENT & PLAN NOTE
· Patient reports daily use of NSAIDs, about 6-8 pills daily, due to chronic back pain  Counseled on cessation of NSAIDs  · Present on admission, creatinine of 3 97, baseline creatinine 0 9  · Lisinopril-hydrochlorothiazide on hold  Creatinine improved from 3 97 yesterday to 3 30 today  Continue IV fluids and continue to monitor creatinine

## 2019-11-07 NOTE — ASSESSMENT & PLAN NOTE
· Patient originally presented to his PCP's office today with complaints of shortness of breath  · Follows with Dr Hawk Files of Cardiology outpatient, last visit of 10/29/2019  · Echo from 57/97/9047 indicated systolic function was normal   Ejection fraction was estimated to be 60%  There were regional wall motion abnormalities  · Continue ASA, Lipitor, Toprol-XL  · Will continue to hold home medication of lisinopril-hydrochlorothiazide in the setting of NIECY  · Continue to monitor

## 2019-11-07 NOTE — ASSESSMENT & PLAN NOTE
· Present on admission, WBC 13 94   · Patient is afebrile  Denies any fever or chills at home  · Urine microscopic:  WBC is innumerable, moderate bacteria  Urine cultures pending  · Chest x-ray: Small lung volumes with subsegmental atelectasis right middle lobe  · Will continue to monitor  Repeat CBC in a m

## 2019-11-07 NOTE — ASSESSMENT & PLAN NOTE
· Patient originally presented to his PCP's office today with complaints of shortness of breath  · Follows with Dr Nicky Whittington of Cardiology outpatient, last visit of 10/29/2019  · Echo from 90/53/6950 indicated systolic function was normal   Ejection fraction was estimated to be 60%  There were regional wall motion abnormalities  · Continue ASA, Lipitor, Toprol-XL  · Will hold home medication of lisinopril-hydrochlorothiazide in the setting of NIECY  · Continue to monitor

## 2019-11-07 NOTE — ASSESSMENT & PLAN NOTE
· Present on admission, sodium of 133  · Appears acute  · Will obtain TSH, osmolality urine, sodium urine, uric acid and cortisol level  · Will start gentle IV hydration  · Repeat BMP in a m

## 2019-11-07 NOTE — ASSESSMENT & PLAN NOTE
· Patient reports over 2 weeks of shortness of breath  He went to his outpatient PCP today, at which point they referred him to the ER for further workup  He reports that the shortness of breath is worse with exertion  He denies chest pain, chest tightness, cough, fever, chills  · On admission, chest x-ray: Small lung volumes with subsegmental atelectasis right middle lobe  · NT-proBNP 733  · Per ER nurse documentation, patient's oxygen saturation decreased to 80% on room air with ambulation  Patient oxygen saturation recovered to 96% on room air once he was returned to sitting at rest   · Will obtain CT of the chest without contrast  · Will obtain D-dimer, however this is likely to be elevated in the setting of acute renal injury  · Will obtain bilateral duplex to rule out DVT  · Telemetry monitoring  · Unable to obtain PE study at this time due to patient's current kidney function  · Will continue to monitor patient's symptoms

## 2019-11-07 NOTE — ASSESSMENT & PLAN NOTE
· Present on admission  · Potassium of 5 6    Secondary to NIECY, lisinopril and potassium pills that patient took for muscle cramping  Potassium 5 3 today  Continue IV fluids

## 2019-11-07 NOTE — ASSESSMENT & PLAN NOTE
· Home regimen of lisinopril-hydrochlorothiazide and and Toprol-XL  · Blood pressure is currently well controlled in the 130s/60s  · Continue Toprol XL  · Will hold lisinopril hydrochlorothiazide at this time due to NIECY  · Continue to monitor

## 2019-11-07 NOTE — ASSESSMENT & PLAN NOTE
· CT chest showed elevated right hemidiaphragm with basilar atelectasis vs scarring of right lung  It also incidentally showed a 2 mm RHONDA nodule and a 12 mm right adrenal nodule  This findings were discussed with patient and are most likely benign  He quit smoking in 1985  His shortness of breath was likely due to dehydration  It has improved with IV fluids

## 2019-11-08 LAB
ANION GAP SERPL CALCULATED.3IONS-SCNC: 8 MMOL/L (ref 4–13)
ATRIAL RATE: 71 BPM
BASOPHILS # BLD AUTO: 0.09 THOUSANDS/ΜL (ref 0–0.1)
BASOPHILS NFR BLD AUTO: 1 % (ref 0–1)
BUN SERPL-MCNC: 44 MG/DL (ref 5–25)
CALCIUM SERPL-MCNC: 8.9 MG/DL (ref 8.3–10.1)
CHLORIDE SERPL-SCNC: 103 MMOL/L (ref 100–108)
CO2 SERPL-SCNC: 26 MMOL/L (ref 21–32)
CREAT SERPL-MCNC: 1.95 MG/DL (ref 0.6–1.3)
EOSINOPHIL # BLD AUTO: 0.23 THOUSAND/ΜL (ref 0–0.61)
EOSINOPHIL NFR BLD AUTO: 2 % (ref 0–6)
ERYTHROCYTE [DISTWIDTH] IN BLOOD BY AUTOMATED COUNT: 13 % (ref 11.6–15.1)
GFR SERPL CREATININE-BSD FRML MDRD: 34 ML/MIN/1.73SQ M
GLUCOSE SERPL-MCNC: 84 MG/DL (ref 65–140)
HCT VFR BLD AUTO: 38.3 % (ref 36.5–49.3)
HGB BLD-MCNC: 11.9 G/DL (ref 12–17)
IMM GRANULOCYTES # BLD AUTO: 0.24 THOUSAND/UL (ref 0–0.2)
IMM GRANULOCYTES NFR BLD AUTO: 2 % (ref 0–2)
LYMPHOCYTES # BLD AUTO: 2.73 THOUSANDS/ΜL (ref 0.6–4.47)
LYMPHOCYTES NFR BLD AUTO: 25 % (ref 14–44)
MAGNESIUM SERPL-MCNC: 1.7 MG/DL (ref 1.6–2.6)
MCH RBC QN AUTO: 29.7 PG (ref 26.8–34.3)
MCHC RBC AUTO-ENTMCNC: 31.1 G/DL (ref 31.4–37.4)
MCV RBC AUTO: 96 FL (ref 82–98)
MONOCYTES # BLD AUTO: 0.78 THOUSAND/ΜL (ref 0.17–1.22)
MONOCYTES NFR BLD AUTO: 7 % (ref 4–12)
NEUTROPHILS # BLD AUTO: 6.96 THOUSANDS/ΜL (ref 1.85–7.62)
NEUTS SEG NFR BLD AUTO: 63 % (ref 43–75)
NRBC BLD AUTO-RTO: 0 /100 WBCS
P AXIS: 84 DEGREES
PLATELET # BLD AUTO: 365 THOUSANDS/UL (ref 149–390)
PMV BLD AUTO: 8.5 FL (ref 8.9–12.7)
POTASSIUM SERPL-SCNC: 4.5 MMOL/L (ref 3.5–5.3)
PR INTERVAL: 160 MS
QRS AXIS: 41 DEGREES
QRSD INTERVAL: 90 MS
QT INTERVAL: 384 MS
QTC INTERVAL: 418 MS
RBC # BLD AUTO: 4.01 MILLION/UL (ref 3.88–5.62)
SODIUM SERPL-SCNC: 137 MMOL/L (ref 136–145)
T WAVE AXIS: 59 DEGREES
VENTRICULAR RATE: 71 BPM
WBC # BLD AUTO: 11.03 THOUSAND/UL (ref 4.31–10.16)

## 2019-11-08 PROCEDURE — 83735 ASSAY OF MAGNESIUM: CPT | Performed by: FAMILY MEDICINE

## 2019-11-08 PROCEDURE — 85025 COMPLETE CBC W/AUTO DIFF WBC: CPT | Performed by: FAMILY MEDICINE

## 2019-11-08 PROCEDURE — 93010 ELECTROCARDIOGRAM REPORT: CPT | Performed by: INTERNAL MEDICINE

## 2019-11-08 PROCEDURE — 80048 BASIC METABOLIC PNL TOTAL CA: CPT | Performed by: FAMILY MEDICINE

## 2019-11-08 PROCEDURE — 99232 SBSQ HOSP IP/OBS MODERATE 35: CPT | Performed by: FAMILY MEDICINE

## 2019-11-08 RX ORDER — MAGNESIUM SULFATE 1 G/100ML
1 INJECTION INTRAVENOUS ONCE
Status: COMPLETED | OUTPATIENT
Start: 2019-11-08 | End: 2019-11-08

## 2019-11-08 RX ORDER — SODIUM CHLORIDE, SODIUM LACTATE, POTASSIUM CHLORIDE, CALCIUM CHLORIDE 600; 310; 30; 20 MG/100ML; MG/100ML; MG/100ML; MG/100ML
75 INJECTION, SOLUTION INTRAVENOUS CONTINUOUS
Status: DISCONTINUED | OUTPATIENT
Start: 2019-11-08 | End: 2019-11-09 | Stop reason: HOSPADM

## 2019-11-08 RX ADMIN — ATORVASTATIN CALCIUM 40 MG: 40 TABLET, FILM COATED ORAL at 08:34

## 2019-11-08 RX ADMIN — SODIUM CHLORIDE, SODIUM LACTATE, POTASSIUM CHLORIDE, AND CALCIUM CHLORIDE 75 ML/HR: .6; .31; .03; .02 INJECTION, SOLUTION INTRAVENOUS at 14:57

## 2019-11-08 RX ADMIN — HEPARIN SODIUM 5000 UNITS: 5000 INJECTION INTRAVENOUS; SUBCUTANEOUS at 22:04

## 2019-11-08 RX ADMIN — MAGNESIUM SULFATE HEPTAHYDRATE 1 G: 1 INJECTION, SOLUTION INTRAVENOUS at 11:00

## 2019-11-08 RX ADMIN — HEPARIN SODIUM 5000 UNITS: 5000 INJECTION INTRAVENOUS; SUBCUTANEOUS at 05:29

## 2019-11-08 RX ADMIN — SODIUM CHLORIDE 75 ML/HR: 0.9 INJECTION, SOLUTION INTRAVENOUS at 00:03

## 2019-11-08 RX ADMIN — METOPROLOL SUCCINATE 25 MG: 25 TABLET, EXTENDED RELEASE ORAL at 22:04

## 2019-11-08 RX ADMIN — ASPIRIN 81 MG 162 MG: 81 TABLET ORAL at 08:33

## 2019-11-08 RX ADMIN — HEPARIN SODIUM 5000 UNITS: 5000 INJECTION INTRAVENOUS; SUBCUTANEOUS at 14:57

## 2019-11-08 RX ADMIN — METOPROLOL SUCCINATE 25 MG: 25 TABLET, EXTENDED RELEASE ORAL at 08:33

## 2019-11-08 NOTE — ASSESSMENT & PLAN NOTE
· Patient reports daily use of NSAIDs, about 6-8 pills daily, due to chronic back pain  Counseled on cessation of NSAIDs  · Present on admission, creatinine of 3 97, baseline creatinine 0 9  · Lisinopril-hydrochlorothiazide on hold  Creatinine improved from 3 30 yesterday 1 95 today  Will continue IV fluids for 1 more day and will recheck creatinine tomorrow

## 2019-11-08 NOTE — PROGRESS NOTES
Progress Note Patricia Ramírez 1950, 68 y o  male MRN: [id number]    Unit/Bed#: S -01 Encounter: [id number]    Primary Care Provider: Deidra Ortega DO   Date and time admitted to hospital: 11/6/2019  3:16 PM    Leukocytosis  Assessment & Plan  · Present on admission  Likely stress reaction  Improved  Will continue to monitor  Hyperkalemia  Assessment & Plan  · Present on admission  · Potassium of 5 6    Secondary to NIECY, lisinopril and potassium pills that patient took for muscle cramping  Potassium normal today  Hyponatremia  Assessment & Plan  · Present on admission, sodium of 133  · Sodium now normal today  NIECY (acute kidney injury) ([facility] )  Assessment & Plan  · Patient reports daily use of NSAIDs, about 6-8 pills daily, due to chronic back pain  Counseled on cessation of NSAIDs  · Present on admission, creatinine of 3 97, baseline creatinine 0 9  · Lisinopril-hydrochlorothiazide on hold  Creatinine improved from 3 30 yesterday 1 95 today  Will continue IV fluids for 1 more day and will recheck creatinine tomorrow  Benign essential hypertension  Assessment & Plan  · Controlled  Continue Toprol XL  Lisinopril-HCTZ on hold due to NIECY  Coronary artery disease  Assessment & Plan  · Patient originally presented to his PCP's office today with complaints of shortness of breath  · Follows with Dr Neri Quiles of Cardiology outpatient, last visit of 10/29/2019  · Echo from 21/56/1213 indicated systolic function was normal   Ejection fraction was estimated to be 60%  There were regional wall motion abnormalities  · Continue ASA, Lipitor, Toprol-XL  · Will continue to hold home medication of lisinopril-hydrochlorothiazide in the setting of NIECY  · Continue to monitor  Chronic back pain  Assessment & Plan  · Patient reports he was taking NSAIDs 4-6 doses throughout the day and Motrin P M  At bedtime for chronic back pain over the past several weeks    · Counseled to stop NSAID use   · He denies pain at this time  · Tylenol and aqua K for pain control  · Continue to monitor  * Shortness of breath  Assessment & Plan  · CT chest showed elevated right hemidiaphragm with basilar atelectasis vs scarring of right lung  It also incidentally showed a 2 mm RHONDA nodule and a 12 mm right adrenal nodule  This findings were discussed with patient and are most likely benign  He quit smoking in 1985  His shortness of breath was likely due to dehydration  It has improved with IV fluids  Subjective/Objective     Subjective:   Patient seen and examined this morning  He is doing quite well  He denies any shortness of breath  No other issues reported  Objective:  Vitals: Blood pressure 131/77, pulse 77, temperature 98 °F (36 7 °C), temperature source Oral, resp  rate 18, SpO2 97 %  ,There is no height or weight on file to calculate BMI  Intake/Output Summary (Last 24 hours) at 11/8/2019 0929  Last data filed at 11/8/2019 0800  Gross per 24 hour   Intake 2122 5 ml   Output 3900 ml   Net -1777 5 ml       Invasive Devices     Peripheral Intravenous Line            Peripheral IV 11/06/19 Right Wrist 1 day                Physical Exam: /77 (BP Location: Left arm)   Pulse 77   Temp 98 °F (36 7 °C) (Oral)   Resp 18   SpO2 97%   General appearance: alert and oriented, in no acute distress  Head: Normocephalic, without obvious abnormality, atraumatic  Eyes: No scleral icterus  Lungs: clear to auscultation bilaterally  Heart: regular rate and rhythm, S1, S2 normal, no murmur, click, rub or gallop  Extremities: extremities normal, warm and well-perfused; no cyanosis, clubbing, or edema  Neurologic: Grossly normal    Lab, Imaging and other studies: I have personally reviewed pertinent reports      VTE Pharmacologic Prophylaxis: Heparin  VTE Mechanical Prophylaxis: sequential compression device

## 2019-11-08 NOTE — ASSESSMENT & PLAN NOTE
· Patient originally presented to his PCP's office today with complaints of shortness of breath  · Follows with Dr Dev Herbert of Cardiology outpatient, last visit of 10/29/2019  · Echo from 65/66/6119 indicated systolic function was normal   Ejection fraction was estimated to be 60%  There were regional wall motion abnormalities  · Continue ASA, Lipitor, Toprol-XL  · Will continue to hold home medication of lisinopril-hydrochlorothiazide in the setting of NIECY  · Continue to monitor

## 2019-11-08 NOTE — ASSESSMENT & PLAN NOTE
· Present on admission  · Potassium of 5 6    Secondary to NIECY, lisinopril and potassium pills that patient took for muscle cramping  Potassium normal today

## 2019-11-08 NOTE — PROGRESS NOTES
Progress Note Brooks Messina 1950, 68 y o  male MRN: [id number]    Unit/Bed#: S -01 Encounter: [id number]    Primary Care Provider: Artemio Stone DO   Date and time admitted to hospital: 11/6/2019  3:16 PM        Leukocytosis  Assessment & Plan  · Present on admission  Likely stress reaction  Improved  Will continue to monitor  Hyperkalemia  Assessment & Plan  · Present on admission  · Potassium of 5 6    Secondary to NIECY, lisinopril and potassium pills that patient took for muscle cramping  Potassium 5 3 today  Continue IV fluids  Hyponatremia  Assessment & Plan  · Present on admission, sodium of 133  · Improved to 135 today  Continue normal saline  NIECY (acute kidney injury) ([facility] )  Assessment & Plan  · Patient reports daily use of NSAIDs, about 6-8 pills daily, due to chronic back pain  Counseled on cessation of NSAIDs  · Present on admission, creatinine of 3 97, baseline creatinine 0 9  · Lisinopril-hydrochlorothiazide on hold  Creatinine improved from 3 97 yesterday to 3 30 today  Continue IV fluids and continue to monitor creatinine  Benign essential hypertension  Assessment & Plan  · Controlled  Continue Toprol XL  Lisinopril-HCTZ on hold due to NIECY  Coronary artery disease  Assessment & Plan  · Patient originally presented to his PCP's office today with complaints of shortness of breath  · Follows with Dr Dann Velazquez of Cardiology outpatient, last visit of 10/29/2019  · Echo from 15/83/3951 indicated systolic function was normal   Ejection fraction was estimated to be 60%  There were regional wall motion abnormalities  · Continue ASA, Lipitor, Toprol-XL  · Will continue to hold home medication of lisinopril-hydrochlorothiazide in the setting of NIECY  · Continue to monitor  Chronic back pain  Assessment & Plan  · Patient reports he was taking NSAIDs 4-6 doses throughout the day and Motrin P M   At bedtime for chronic back pain over the past several weeks   · Counseled to stop NSAID use  · He denies pain at this time  · Tylenol and aqua K for pain control  · Continue to monitor  * Shortness of breath  Assessment & Plan  · CT chest showed elevated right hemidiaphragm with basilar atelectasis vs scarring of right lung  It also incidentally showed a 2 mm RHONDA nodule and a 12 mm right adrenal nodule  This findings were discussed with patient and are most likely benign  He quit smoking in 1985  His shortness of breath was likely due to dehydration  It has improved with IV fluids  Subjective/Objective     Subjective:   Patient seen and examined this evening  He is feeling better compared to yesterday  He has no complaints at the moment  Objective:  Vitals: Blood pressure 139/82, pulse 70, temperature 98 6 °F (37 °C), temperature source Oral, resp  rate 18, SpO2 96 %  ,There is no height or weight on file to calculate BMI  Intake/Output Summary (Last 24 hours) at 11/7/2019 2229  Last data filed at 11/7/2019 2059  Gross per 24 hour   Intake 1217 5 ml   Output 1900 ml   Net -682 5 ml       Invasive Devices     Peripheral Intravenous Line            Peripheral IV 11/06/19 Right Wrist 1 day                Physical Exam: /82 (BP Location: Left arm)   Pulse 70   Temp 98 6 °F (37 °C) (Oral)   Resp 18   SpO2 96%   General appearance: alert and oriented, in no acute distress  Head: Normocephalic, without obvious abnormality, atraumatic  Eyes: No scleral icterus  Lungs: clear to auscultation bilaterally  Heart: regular rate and rhythm, S1, S2 normal, no murmur, click, rub or gallop  Extremities: extremities normal, warm and well-perfused; no cyanosis, clubbing, or edema  Neurologic: Grossly normal    Lab, Imaging and other studies: I have personally reviewed pertinent reports      VTE Pharmacologic Prophylaxis: Heparin  VTE Mechanical Prophylaxis: sequential compression device

## 2019-11-09 VITALS
HEART RATE: 62 BPM | SYSTOLIC BLOOD PRESSURE: 121 MMHG | DIASTOLIC BLOOD PRESSURE: 74 MMHG | RESPIRATION RATE: 18 BRPM | TEMPERATURE: 98 F | OXYGEN SATURATION: 95 %

## 2019-11-09 PROBLEM — E87.5 HYPERKALEMIA: Status: RESOLVED | Noted: 2019-11-06 | Resolved: 2019-11-09

## 2019-11-09 PROBLEM — R06.02 SHORTNESS OF BREATH: Status: RESOLVED | Noted: 2019-11-06 | Resolved: 2019-11-09

## 2019-11-09 PROBLEM — E87.1 HYPONATREMIA: Status: RESOLVED | Noted: 2019-11-06 | Resolved: 2019-11-09

## 2019-11-09 LAB
ANION GAP SERPL CALCULATED.3IONS-SCNC: 6 MMOL/L (ref 4–13)
BACTERIA UR CULT: ABNORMAL
BASOPHILS # BLD AUTO: 0.08 THOUSANDS/ΜL (ref 0–0.1)
BASOPHILS NFR BLD AUTO: 1 % (ref 0–1)
BUN SERPL-MCNC: 32 MG/DL (ref 5–25)
CALCIUM SERPL-MCNC: 9.2 MG/DL (ref 8.3–10.1)
CHLORIDE SERPL-SCNC: 104 MMOL/L (ref 100–108)
CO2 SERPL-SCNC: 28 MMOL/L (ref 21–32)
CREAT SERPL-MCNC: 1.41 MG/DL (ref 0.6–1.3)
EOSINOPHIL # BLD AUTO: 0.33 THOUSAND/ΜL (ref 0–0.61)
EOSINOPHIL NFR BLD AUTO: 3 % (ref 0–6)
ERYTHROCYTE [DISTWIDTH] IN BLOOD BY AUTOMATED COUNT: 13 % (ref 11.6–15.1)
GFR SERPL CREATININE-BSD FRML MDRD: 50 ML/MIN/1.73SQ M
GLUCOSE SERPL-MCNC: 81 MG/DL (ref 65–140)
GLUCOSE SERPL-MCNC: 86 MG/DL (ref 65–140)
HCT VFR BLD AUTO: 37.3 % (ref 36.5–49.3)
HGB BLD-MCNC: 11.6 G/DL (ref 12–17)
IMM GRANULOCYTES # BLD AUTO: 0.16 THOUSAND/UL (ref 0–0.2)
IMM GRANULOCYTES NFR BLD AUTO: 1 % (ref 0–2)
LYMPHOCYTES # BLD AUTO: 2.99 THOUSANDS/ΜL (ref 0.6–4.47)
LYMPHOCYTES NFR BLD AUTO: 25 % (ref 14–44)
MAGNESIUM SERPL-MCNC: 1.7 MG/DL (ref 1.6–2.6)
MCH RBC QN AUTO: 30.1 PG (ref 26.8–34.3)
MCHC RBC AUTO-ENTMCNC: 31.1 G/DL (ref 31.4–37.4)
MCV RBC AUTO: 97 FL (ref 82–98)
MONOCYTES # BLD AUTO: 0.87 THOUSAND/ΜL (ref 0.17–1.22)
MONOCYTES NFR BLD AUTO: 7 % (ref 4–12)
NEUTROPHILS # BLD AUTO: 7.44 THOUSANDS/ΜL (ref 1.85–7.62)
NEUTS SEG NFR BLD AUTO: 63 % (ref 43–75)
NRBC BLD AUTO-RTO: 0 /100 WBCS
PLATELET # BLD AUTO: 350 THOUSANDS/UL (ref 149–390)
PMV BLD AUTO: 8.7 FL (ref 8.9–12.7)
POTASSIUM SERPL-SCNC: 4.7 MMOL/L (ref 3.5–5.3)
RBC # BLD AUTO: 3.86 MILLION/UL (ref 3.88–5.62)
SODIUM SERPL-SCNC: 138 MMOL/L (ref 136–145)
WBC # BLD AUTO: 11.87 THOUSAND/UL (ref 4.31–10.16)

## 2019-11-09 PROCEDURE — 80048 BASIC METABOLIC PNL TOTAL CA: CPT | Performed by: FAMILY MEDICINE

## 2019-11-09 PROCEDURE — 82948 REAGENT STRIP/BLOOD GLUCOSE: CPT

## 2019-11-09 PROCEDURE — 83735 ASSAY OF MAGNESIUM: CPT | Performed by: FAMILY MEDICINE

## 2019-11-09 PROCEDURE — 85025 COMPLETE CBC W/AUTO DIFF WBC: CPT | Performed by: FAMILY MEDICINE

## 2019-11-09 PROCEDURE — 99238 HOSP IP/OBS DSCHRG MGMT 30/<: CPT | Performed by: FAMILY MEDICINE

## 2019-11-09 RX ADMIN — ATORVASTATIN CALCIUM 40 MG: 40 TABLET, FILM COATED ORAL at 09:13

## 2019-11-09 RX ADMIN — SODIUM CHLORIDE, SODIUM LACTATE, POTASSIUM CHLORIDE, AND CALCIUM CHLORIDE 75 ML/HR: .6; .31; .03; .02 INJECTION, SOLUTION INTRAVENOUS at 01:13

## 2019-11-09 RX ADMIN — ASPIRIN 81 MG 162 MG: 81 TABLET ORAL at 09:13

## 2019-11-09 RX ADMIN — METOPROLOL SUCCINATE 25 MG: 25 TABLET, EXTENDED RELEASE ORAL at 09:13

## 2019-11-09 RX ADMIN — HEPARIN SODIUM 5000 UNITS: 5000 INJECTION INTRAVENOUS; SUBCUTANEOUS at 06:22

## 2019-11-09 NOTE — ASSESSMENT & PLAN NOTE
· Thought to be due to stress reaction  However, looking through old labs it looks like patient has a modest leukocytosis chronically

## 2019-11-09 NOTE — DISCHARGE INSTR - AVS FIRST PAGE
Stop taking lisinopril/HCTZ  Drink at least 64 oz of water a day  You have lab work to be drawn  Have a basic metabolic panel drawn this coming Wednesday  Please avoid nonsteroidal anti-inflammatory drugs (NSAIDs) such is ibuprofen (Motrin, Advil) and naproxen (Aleve)  For aches and pains it is okay to take acetaminophen (Tylenol)  Do not take more than 4,000 mg of Tylenol in a 24 hour period

## 2019-11-09 NOTE — ASSESSMENT & PLAN NOTE
· CT chest showed elevated right hemidiaphragm with basilar atelectasis vs scarring of right lung  It also incidentally showed a 2 mm RHONDA nodule and a 12 mm right adrenal nodule  This findings were discussed with patient and are most likely benign  He quit smoking in 1985  His shortness of breath was likely due to dehydration  This has resolved with IV fluids

## 2019-11-09 NOTE — DISCHARGE SUMMARY
Discharge- Angel Beasley 1950, 68 y o  male MRN: [id number]    Unit/Bed#: S -01 Encounter: [id number]    Primary Care Provider: Elsy Camarillo DO   Date and time admitted to hospital: 11/6/2019  3:16 PM    Leukocytosis  Assessment & Plan  · Thought to be due to stress reaction  However, looking through old labs it looks like patient has a modest leukocytosis chronically  Benign essential hypertension  Assessment & Plan  · Controlled  Continue Toprol XL  Lisinopril-HCTZ on hold due to NIECY  Coronary artery disease  Assessment & Plan  · Patient originally presented to his PCP's office today with complaints of shortness of breath  · Follows with Dr Ilir Aguilera of Cardiology outpatient, last visit of 10/29/2019  · Echo from 73/83/7649 indicated systolic function was normal   Ejection fraction was estimated to be 60%  There were regional wall motion abnormalities  · Continue ASA, Lipitor, Toprol-XL    Will continue to hold lisinopril/HCTZ as creatinine is not back at baseline as yet  If creatinine does return to baseline I would consider starting back stand-alone lisinopril  Chronic back pain  Assessment & Plan  · Patient reports he was taking NSAIDs 4-6 doses throughout the day and Motrin P M  At bedtime for chronic back pain over the past several weeks  · Counseled to stop NSAID use  · He denies pain at this time  · Tylenol and aqua K for pain control    * NIECY (acute kidney injury) ([facility] [address] )  Assessment & Plan  · Patient reports daily use of NSAIDs, about 6-8 pills daily, due to chronic back pain  Counseled on cessation of NSAIDs  · Present on admission, creatinine of 3 97, baseline creatinine 0 9  · Lisinopril-hydrochlorothiazide on hold  Creatinine improved from 1 95 yesterday to 1 41 today  This is still not at patient's baseline, but I believe he is okay for discharge today  Knows to avoid NSAIDs and to stop lisinopril/HCTZ for now    He was also instructed to stay well hydrated and to get a repeat BMP next week  Hyperkalemiaresolved as of 11/9/2019  Assessment & Plan  Secondary to NIECY, lisinopril and potassium pills that patient took for muscle cramping  Resolved with IV fluids  Hyponatremiaresolved as of 11/9/2019  Assessment & Plan  · Present on admission, sodium of 133  · Sodium now normal today  Shortness of breathresolved as of 11/9/2019  Assessment & Plan  · CT chest showed elevated right hemidiaphragm with basilar atelectasis vs scarring of right lung  It also incidentally showed a 2 mm RHONDA nodule and a 12 mm right adrenal nodule  This findings were discussed with patient and are most likely benign  He quit smoking in 1985  His shortness of breath was likely due to dehydration  This has resolved with IV fluids  Disposition:     Home    Reason for Admission:  Acute kidney injury    Discharge Diagnoses:     Principal Problem:    NIECY (acute kidney injury) ([facility] [address] )  Active Problems:    Chronic back pain    Coronary artery disease    Benign essential hypertension    Leukocytosis  Resolved Problems:    Shortness of breath    Hyponatremia    Hyperkalemia      Consultations During Hospital Stay:  · None    Procedures Performed:     · None    Significant Findings / Test Results:     CT chest wo contrast [[id number]] Collected: 11/07/19 1105   Order Status: Completed Updated: 11/07/19 1124   Narrative:     CT CHEST WITHOUT IV CONTRAST    INDICATION:   Shortness of breath  COMPARISON: Jenelle Oliveira are no previous CT studies for comparison  [employer] is made to the chest x-ray study performed yesterday    TECHNIQUE: CT examination of the chest was performed without intravenous contrast   Axial, sagittal, and coronal 2D reformatted images were created from the source data and submitted for interpretation      Radiation dose length product (DLP) for this visit: [id number] 61 mGy-cm    This examination, like all CT scans performed in the [facility], was performed utilizing techniques to minimize radiation dose exposure, including the use of iterative   reconstruction and automated exposure control  FINDINGS:    LUNGS: Ham Gavel is linear subsegmental atelectasis or scarring at the base of the right middle lobe and right lower lobe  Ham Gavel is a 2 mm subpleural nodule in the left lower lobe laterally, image 3/83   The lungs are otherwise clear   Right hemidiaphragm   is noted to be elevated    PLEURA:  Unremarkable  HEART/GREAT VESSELS:  Coronary artery calcification   Calcification in the region of the mitral valve   No pericardial effusion  MEDIASTINUM AND SHAR:  Unremarkable  CHEST WALL AND LOWER NECK:   Nonspecific subcutaneous nodule, left anterior chest wall image 2/29 measuring 9 mm   No axillary adenopathy    VISUALIZED STRUCTURES IN THE UPPER ABDOMEN:  12 mm right adrenal nodule identified   Although minimally more dense than typical cutoff values for adrenal adenoma, benign adenoma is the favored diagnosis  OSSEOUS STRUCTURES:  Mild loss of height of multiple thoracic vertebrae with degenerative changes similar to prior chest x-ray from 2015   Severe degenerative changes of the left shoulder noted, moderate changes on the right   Impression:       1  Elevated right hemidiaphragm with adjacent subsegmental atelectasis or scarring at the right lung base  2  2 mm left upper lobe pulmonary nodule  Based on current Fleischner Society 2017 Guidelines on incidental pulmonary nodule, no routine follow-up is needed if the patient is considered low risk for lung cancer   If the patient is considered high risk   for lung cancer, 12 month follow-up non-contrast chest CT is recommended    3  No evidence of airspace consolidation or other acute pulmonary disease  4  12 mm right adrenal nodule, probable adenoma   9 mm left anterior chest wall subcutaneous nodule, nonspecific, may represent sebaceous cyst   5  Coronary artery calcification          Workstation performed: [id number]   XR chest 2 views [[id number]] Collected: 11/06/19 1550   Order Status: Completed Updated: 11/06/19 1553   Narrative:     CHEST     INDICATION:   SOB  COMPARISON:  1/12/2015    EXAM PERFORMED/VIEWS:  UG CHEST PA & LATERAL  The frontal view was performed utilizing dual energy radiographic technique  FINDINGS:    Cardiomediastinal silhouette appears unremarkable  Small lung volumes and asymmetric elevation of the right hemidiaphragm   The lungs are clear other than subsegmental atelectasis in the right middle lobe   No pneumothorax or pleural effusion  Osseous structures appear within normal limits for patient age  Impression:       Small lung volumes with subsegmental atelectasis right middle lobe  ·     Incidental Findings:   · 2 mm left upper lobe pulmonary nodule  Patient is low risk for lung cancer as he quit smoking in 1985  I do not think he needs any routine follow-up for this  ·   · 12 mm right adrenal nodule, probable adenoma  ·   · 9 mm left anterior chest wall subcutaneous nodule which may represent a sebaceous cyst   ·   · Elevated right hemidiaphragm with adjacent subsegmental atelectasis or scarring at the right lung base  Test Results Pending at Discharge (will require follow up): · None     Outpatient Tests Requested:  · BMP    Complications:  None    Hospital Course:     Patient presented to the emergency room for shortness of breath  He was found to be hypotensive in dehydrated and he also had acute kidney injury with a creatinine of 3 97  His baseline creatinine is around 1 0  Prior to admission he was taking lisinopril/HCTZ and he was taking a lot of NSAIDs for his chronic back pain  He was started on IV fluids  Nephrotoxic medications were held  Patient's creatinine did improve  It did not return to his baseline but did come down to 1 41 on day of discharge  At this point his creatinine can be monitored as an outpatient    He was instructed to continue holding lisinopril/HCTZ, to stay well hydrated and to avoid NSAIDs  He expressed understanding  We will get a repeat BMP in 4 days and he will follow up with his PCP  Condition at Discharge: stable     Discharge Day Visit / Exam:     Subjective:  Patient was seen and examined this morning  He is feeling quite well and is eager to go home today  Vitals: Blood Pressure: 121/74 (11/09/19 0736)  Pulse: 62 (11/09/19 0736)  Temperature: 98 °F (36 7 °C) (11/09/19 0736)  Temp Source: Oral (11/09/19 0736)  Respirations: 18 (11/09/19 0736)  SpO2: 95 % (11/09/19 0736)  Exam:   Physical Exam   Constitutional: He is oriented to person, place, and time  He appears well-developed and well-nourished  No distress  HENT:   Head: Normocephalic and atraumatic  Eyes: No scleral icterus  Pulmonary/Chest: No respiratory distress  Neurological: He is alert and oriented to person, place, and time  Psychiatric: He has a normal mood and affect  His behavior is normal        Discharge instructions/Information to patient and family:   See after visit summary for information provided to patient and family  Provisions for Follow-Up Care:  See after visit summary for information related to follow-up care and any pertinent home health orders  Planned Readmission:  None     Discharge Statement:  I spent 30 minutes discharging the patient  This time was spent on the day of discharge  I had direct contact with the patient on the day of discharge  Greater than 50% of the total time was spent examining patient, answering all patient questions, arranging and discussing plan of care with patient as well as directly providing post-discharge instructions  Additional time then spent on discharge activities  Discharge Medications:  See after visit summary for reconciled discharge medications provided to patient and family        ** Please Note: This note has been constructed using a voice recognition system **

## 2019-11-09 NOTE — ASSESSMENT & PLAN NOTE
· Patient originally presented to his PCP's office today with complaints of shortness of breath  · Follows with Dr Estrella rAdon of Cardiology outpatient, last visit of 10/29/2019  · Echo from 87/08/4892 indicated systolic function was normal   Ejection fraction was estimated to be 60%  There were regional wall motion abnormalities  · Continue ASA, Lipitor, Toprol-XL    Will continue to hold lisinopril/HCTZ as creatinine is not back at baseline as yet  If creatinine does return to baseline I would consider starting back stand-alone lisinopril

## 2019-11-09 NOTE — ASSESSMENT & PLAN NOTE
Secondary to NIECY, lisinopril and potassium pills that patient took for muscle cramping  Resolved with IV fluids

## 2019-11-09 NOTE — ASSESSMENT & PLAN NOTE
· Patient reports he was taking NSAIDs 4-6 doses throughout the day and Motrin P M  At bedtime for chronic back pain over the past several weeks  · Counseled to stop NSAID use  · He denies pain at this time    · Tylenol and aqua K for pain control

## 2019-11-09 NOTE — RESULT ENCOUNTER NOTE
Patient was notified of positive urine culture and the need for an antibiotic  Ceftin 500 mg, dispensed 14 tablets [facility]

## 2019-11-09 NOTE — ASSESSMENT & PLAN NOTE
· Patient reports daily use of NSAIDs, about 6-8 pills daily, due to chronic back pain  Counseled on cessation of NSAIDs  · Present on admission, creatinine of 3 97, baseline creatinine 0 9  · Lisinopril-hydrochlorothiazide on hold  Creatinine improved from 1 95 yesterday to 1 41 today  This is still not at patient's baseline, but I believe he is okay for discharge today  Knows to avoid NSAIDs and to stop lisinopril/HCTZ for now  He was also instructed to stay well hydrated and to get a repeat BMP next week

## 2019-11-11 ENCOUNTER — TRANSITIONAL CARE MANAGEMENT (OUTPATIENT)
Dept: FAMILY MEDICINE CLINIC | Facility: CLINIC | Age: 69
End: 2019-11-11

## 2019-11-12 ENCOUNTER — APPOINTMENT (OUTPATIENT)
Dept: LAB | Facility: CLINIC | Age: 69
End: 2019-11-12
Payer: MEDICARE

## 2019-11-12 DIAGNOSIS — N17.9 AKI (ACUTE KIDNEY INJURY) (HCC): ICD-10-CM

## 2019-11-12 LAB
ANION GAP SERPL CALCULATED.3IONS-SCNC: 7 MMOL/L (ref 4–13)
BUN SERPL-MCNC: 21 MG/DL (ref 5–25)
CALCIUM SERPL-MCNC: 9.7 MG/DL (ref 8.3–10.1)
CHLORIDE SERPL-SCNC: 99 MMOL/L (ref 100–108)
CO2 SERPL-SCNC: 29 MMOL/L (ref 21–32)
CREAT SERPL-MCNC: 1.3 MG/DL (ref 0.6–1.3)
GFR SERPL CREATININE-BSD FRML MDRD: 55 ML/MIN/1.73SQ M
GLUCOSE P FAST SERPL-MCNC: 92 MG/DL (ref 65–99)
POTASSIUM SERPL-SCNC: 4.4 MMOL/L (ref 3.5–5.3)
SODIUM SERPL-SCNC: 135 MMOL/L (ref 136–145)

## 2019-11-12 PROCEDURE — 36415 COLL VENOUS BLD VENIPUNCTURE: CPT

## 2019-11-12 PROCEDURE — 80048 BASIC METABOLIC PNL TOTAL CA: CPT

## 2019-11-13 ENCOUNTER — OFFICE VISIT (OUTPATIENT)
Dept: FAMILY MEDICINE CLINIC | Facility: CLINIC | Age: 69
End: 2019-11-13
Payer: MEDICARE

## 2019-11-13 VITALS
WEIGHT: 264 LBS | DIASTOLIC BLOOD PRESSURE: 70 MMHG | OXYGEN SATURATION: 97 % | BODY MASS INDEX: 37.8 KG/M2 | SYSTOLIC BLOOD PRESSURE: 122 MMHG | HEART RATE: 78 BPM | RESPIRATION RATE: 18 BRPM | HEIGHT: 70 IN

## 2019-11-13 DIAGNOSIS — I10 BENIGN ESSENTIAL HYPERTENSION: ICD-10-CM

## 2019-11-13 DIAGNOSIS — E66.9 OBESITY (BMI 30-39.9): ICD-10-CM

## 2019-11-13 DIAGNOSIS — I49.3 PVC'S (PREMATURE VENTRICULAR CONTRACTIONS): ICD-10-CM

## 2019-11-13 DIAGNOSIS — G89.29 CHRONIC BILATERAL LOW BACK PAIN WITHOUT SCIATICA: ICD-10-CM

## 2019-11-13 DIAGNOSIS — I49.9 IRREGULAR HEART RHYTHM: ICD-10-CM

## 2019-11-13 DIAGNOSIS — N17.9 AKI (ACUTE KIDNEY INJURY) (HCC): Primary | ICD-10-CM

## 2019-11-13 DIAGNOSIS — M54.50 CHRONIC BILATERAL LOW BACK PAIN WITHOUT SCIATICA: ICD-10-CM

## 2019-11-13 DIAGNOSIS — I25.10 CORONARY ARTERY DISEASE INVOLVING NATIVE HEART WITHOUT ANGINA PECTORIS, UNSPECIFIED VESSEL OR LESION TYPE: ICD-10-CM

## 2019-11-13 PROCEDURE — 1111F DSCHRG MED/CURRENT MED MERGE: CPT | Performed by: NURSE PRACTITIONER

## 2019-11-13 PROCEDURE — 99496 TRANSJ CARE MGMT HIGH F2F 7D: CPT | Performed by: NURSE PRACTITIONER

## 2019-11-13 PROCEDURE — 93000 ELECTROCARDIOGRAM COMPLETE: CPT | Performed by: NURSE PRACTITIONER

## 2019-11-13 NOTE — PROGRESS NOTES
Assessment/Plan:      Diagnoses and all orders for this visit:    NIECY (acute kidney injury) ([facility] [address] )  -     Comprehensive metabolic panel; Future  -     CBC and differential; Future    Patient is here for a hospital follow-up for acute kidney injury  Patient initially went to the hospital with SOB and was admitted  Patient was diagnosed with acute kidney injury  Patient was taking nsaids OTC for back pain  Denies anymore SOB  Patient's most recent Creatinine is 1 3, GFR 55  Patient instructed to continue to make sure that he is properly hydrated  CMP and CBC ordered  Patient instructed to avoid Nsaids  Coronary artery disease involving native heart without angina pectoris, unspecified vessel or lesion type  Patient encouraged to continue to follow-up with cardiology  Benign essential hypertension  -     Comprehensive metabolic panel; Future  -     CBC and differential; Future    Stable  Continue metoprolol  PVC's (premature ventricular contractions)  Rhythm sounded irregular on auscultation  EKG done and reviewed with Dr Demar Larios  EKG- Sinus rhythm with frequent PVCs  Patient reports that he has had frequent PVCs for years  Patient reports that he had a holter monitor done and it found frequent PVCs  Patient reports that he takes metoprolol as prescribed and follows up with cardiology  Irregular heart rhythm  -     POCT ECG    Chronic bilateral low back pain without sciatica  Patient encouraged to continue to follow-up with pain management  Do not take nsaids  Obesity (BMI 30-39  9)  Patient instructed to eat a low fat diet  Lab work ordered  Patient instructed to follow-up in 1 month or sooner prn  Subjective:     Patient ID: Angel Beasley is a 68 y o  male  Patient is here for a hospital follow-up for acute kidney injury  Patient reports that he was admitted on 11/6/19 for SOB  Patient reports that he had acute kidney disease  Patient reports that he was dehydrated   Patient reports that he was given IV fluids in the hospital  Patient reports that he is drinking more fluids  Denies anymore SOB  Denies any cough or wheezing  Patient reports that he was taking lots of ibuprofen OTC for back pain which affected his kidney function  Patient reports that he is no longer taking the nsaids for his back  Patient reports that he follows up with pain management for chronic lower back pain  Patient reports that he was discharged on 11/9/19  Patient reports that he feels much better  Patient reports that he is taking metoprolol for HTN and frequent PVCs  Patient reports that he has had a holter monitor done and they found lots of PVCs  Patient follows up with cardiology  Patient reports that they stopped his lisinopril-HCTZ due to his kidney function at the hospital  Denies any chest pain, SOB, palpitations, or dizziness  TCM Call (since 10/13/2019)     Date and time call was made  11/11/2019  9:21 AM    Hospital care reviewed  Records reviewed    Patient was hospitialized at  [facility]    Date of Admission  11/06/19    Date of discharge  11/09/19    Diagnosis  NIECY (acute kidney injury) [facility])     Disposition  Home    Were the patients medications reviewed and updated  Yes    Current Symptoms  None      TCM Call (since 10/13/2019)     Post hospital issues  None    Should patient be enrolled in anticoag monitoring? No    Scheduled for follow up?   Yes    Did you obtain your prescribed medications  Yes    Do you need help managing your prescriptions or medications  No    Is transportation to your appointment needed  No    I have advised the patient to call PCP with any new or worsening symptoms  ARMANI CAAL MA    Living Arrangements  Spouse or Significiant other    Support System  Family    Do you have social support  Yes, as much as I need    Are you recieving any outpatient services  No    Are you recieving home care services  No    Are you using any community resources  No    Current waiver services  No    Have you fallen in the last 12 months  No    Interperter language line needed  No        Review of Systems   Constitutional: Negative for chills, fatigue and fever  HENT: Negative for congestion, ear pain and sore throat  Respiratory: Negative for cough, chest tightness, shortness of breath and wheezing  Cardiovascular: Negative for chest pain, palpitations and leg swelling  Gastrointestinal: Negative for abdominal pain, diarrhea, nausea and vomiting  Genitourinary: Negative for dysuria, frequency and hematuria  Musculoskeletal:        As noted in HPI  Skin: Negative for rash  Neurological: Negative for dizziness, syncope, light-headedness and headaches  Objective:     Physical Exam   Constitutional: He is oriented to person, place, and time  No distress  obese   HENT:   Right Ear: External ear normal    Left Ear: External ear normal    Mouth/Throat: Oropharynx is clear and moist    Eyes: Pupils are equal, round, and reactive to light  Conjunctivae are normal    Cardiovascular: Normal rate and normal heart sounds  An irregular rhythm present  No edema noted  Pulmonary/Chest: Effort normal and breath sounds normal  No respiratory distress  He has no wheezes  Neurological: He is alert and oriented to person, place, and time  Skin: No rash noted  Psychiatric: He has a normal mood and affect  Vitals reviewed  BMI Counseling: Body mass index is 37 88 kg/m²  The BMI is above normal  Nutrition recommendations include reducing intake of saturated fat and trans fat and reducing intake of cholesterol

## 2019-11-23 DIAGNOSIS — I25.10 CORONARY ARTERY DISEASE INVOLVING NATIVE CORONARY ARTERY OF NATIVE HEART WITHOUT ANGINA PECTORIS: ICD-10-CM

## 2019-11-23 RX ORDER — METOPROLOL SUCCINATE 25 MG/1
TABLET, EXTENDED RELEASE ORAL
Qty: 180 TABLET | Refills: 2 | Status: SHIPPED | OUTPATIENT
Start: 2019-11-23 | End: 2020-06-09 | Stop reason: SDUPTHER

## 2019-12-09 ENCOUNTER — APPOINTMENT (OUTPATIENT)
Dept: LAB | Facility: CLINIC | Age: 69
End: 2019-12-09
Payer: MEDICARE

## 2019-12-09 DIAGNOSIS — I10 BENIGN ESSENTIAL HYPERTENSION: ICD-10-CM

## 2019-12-09 DIAGNOSIS — N17.9 AKI (ACUTE KIDNEY INJURY) (HCC): ICD-10-CM

## 2019-12-09 LAB
ALBUMIN SERPL BCP-MCNC: 3.6 G/DL (ref 3.5–5)
ALP SERPL-CCNC: 91 U/L (ref 46–116)
ALT SERPL W P-5'-P-CCNC: 20 U/L (ref 12–78)
ANION GAP SERPL CALCULATED.3IONS-SCNC: 4 MMOL/L (ref 4–13)
AST SERPL W P-5'-P-CCNC: 15 U/L (ref 5–45)
BASOPHILS # BLD AUTO: 0.04 THOUSANDS/ΜL (ref 0–0.1)
BASOPHILS NFR BLD AUTO: 0 % (ref 0–1)
BILIRUB SERPL-MCNC: 1.12 MG/DL (ref 0.2–1)
BUN SERPL-MCNC: 18 MG/DL (ref 5–25)
CALCIUM SERPL-MCNC: 9.4 MG/DL (ref 8.3–10.1)
CHLORIDE SERPL-SCNC: 109 MMOL/L (ref 100–108)
CO2 SERPL-SCNC: 30 MMOL/L (ref 21–32)
CREAT SERPL-MCNC: 1.04 MG/DL (ref 0.6–1.3)
EOSINOPHIL # BLD AUTO: 0.19 THOUSAND/ΜL (ref 0–0.61)
EOSINOPHIL NFR BLD AUTO: 2 % (ref 0–6)
ERYTHROCYTE [DISTWIDTH] IN BLOOD BY AUTOMATED COUNT: 14.6 % (ref 11.6–15.1)
GFR SERPL CREATININE-BSD FRML MDRD: 72 ML/MIN/1.73SQ M
GLUCOSE P FAST SERPL-MCNC: 87 MG/DL (ref 65–99)
HCT VFR BLD AUTO: 42.6 % (ref 36.5–49.3)
HGB BLD-MCNC: 12.9 G/DL (ref 12–17)
IMM GRANULOCYTES # BLD AUTO: 0.05 THOUSAND/UL (ref 0–0.2)
IMM GRANULOCYTES NFR BLD AUTO: 0 % (ref 0–2)
LYMPHOCYTES # BLD AUTO: 2.5 THOUSANDS/ΜL (ref 0.6–4.47)
LYMPHOCYTES NFR BLD AUTO: 19 % (ref 14–44)
MCH RBC QN AUTO: 29.9 PG (ref 26.8–34.3)
MCHC RBC AUTO-ENTMCNC: 30.3 G/DL (ref 31.4–37.4)
MCV RBC AUTO: 99 FL (ref 82–98)
MONOCYTES # BLD AUTO: 0.8 THOUSAND/ΜL (ref 0.17–1.22)
MONOCYTES NFR BLD AUTO: 6 % (ref 4–12)
NEUTROPHILS # BLD AUTO: 9.38 THOUSANDS/ΜL (ref 1.85–7.62)
NEUTS SEG NFR BLD AUTO: 73 % (ref 43–75)
NRBC BLD AUTO-RTO: 0 /100 WBCS
PLATELET # BLD AUTO: 262 THOUSANDS/UL (ref 149–390)
PMV BLD AUTO: 9.7 FL (ref 8.9–12.7)
POTASSIUM SERPL-SCNC: 4.4 MMOL/L (ref 3.5–5.3)
PROT SERPL-MCNC: 7.2 G/DL (ref 6.4–8.2)
RBC # BLD AUTO: 4.32 MILLION/UL (ref 3.88–5.62)
SODIUM SERPL-SCNC: 143 MMOL/L (ref 136–145)
WBC # BLD AUTO: 12.96 THOUSAND/UL (ref 4.31–10.16)

## 2019-12-09 PROCEDURE — 36415 COLL VENOUS BLD VENIPUNCTURE: CPT

## 2019-12-09 PROCEDURE — 85025 COMPLETE CBC W/AUTO DIFF WBC: CPT

## 2019-12-09 PROCEDURE — 80053 COMPREHEN METABOLIC PANEL: CPT

## 2019-12-11 ENCOUNTER — OFFICE VISIT (OUTPATIENT)
Dept: FAMILY MEDICINE CLINIC | Facility: CLINIC | Age: 69
End: 2019-12-11
Payer: MEDICARE

## 2019-12-11 VITALS
BODY MASS INDEX: 39.17 KG/M2 | WEIGHT: 273.6 LBS | HEIGHT: 70 IN | SYSTOLIC BLOOD PRESSURE: 120 MMHG | OXYGEN SATURATION: 96 % | HEART RATE: 72 BPM | RESPIRATION RATE: 18 BRPM | DIASTOLIC BLOOD PRESSURE: 76 MMHG

## 2019-12-11 DIAGNOSIS — M54.50 CHRONIC BILATERAL LOW BACK PAIN WITHOUT SCIATICA: ICD-10-CM

## 2019-12-11 DIAGNOSIS — D72.829 LEUKOCYTOSIS, UNSPECIFIED TYPE: ICD-10-CM

## 2019-12-11 DIAGNOSIS — E66.9 OBESITY (BMI 30-39.9): ICD-10-CM

## 2019-12-11 DIAGNOSIS — R17 ELEVATED BILIRUBIN: ICD-10-CM

## 2019-12-11 DIAGNOSIS — E27.8 ADRENAL NODULE (HCC): ICD-10-CM

## 2019-12-11 DIAGNOSIS — Z12.11 SCREENING FOR COLON CANCER: ICD-10-CM

## 2019-12-11 DIAGNOSIS — Z00.00 MEDICARE ANNUAL WELLNESS VISIT, INITIAL: Primary | ICD-10-CM

## 2019-12-11 DIAGNOSIS — I25.10 CORONARY ARTERY DISEASE INVOLVING NATIVE HEART WITHOUT ANGINA PECTORIS, UNSPECIFIED VESSEL OR LESION TYPE: ICD-10-CM

## 2019-12-11 DIAGNOSIS — I49.3 PVC'S (PREMATURE VENTRICULAR CONTRACTIONS): ICD-10-CM

## 2019-12-11 DIAGNOSIS — Z12.5 ENCOUNTER FOR PROSTATE CANCER SCREENING: ICD-10-CM

## 2019-12-11 DIAGNOSIS — G89.29 CHRONIC BILATERAL LOW BACK PAIN WITHOUT SCIATICA: ICD-10-CM

## 2019-12-11 DIAGNOSIS — I10 BENIGN ESSENTIAL HYPERTENSION: ICD-10-CM

## 2019-12-11 PROBLEM — E27.9 ADRENAL NODULE (HCC): Status: ACTIVE | Noted: 2019-12-11

## 2019-12-11 PROCEDURE — G0438 PPPS, INITIAL VISIT: HCPCS | Performed by: NURSE PRACTITIONER

## 2019-12-11 PROCEDURE — 99214 OFFICE O/P EST MOD 30 MIN: CPT | Performed by: NURSE PRACTITIONER

## 2019-12-11 NOTE — PATIENT INSTRUCTIONS

## 2019-12-11 NOTE — PROGRESS NOTES
Assessment/Plan:      Diagnoses and all orders for this visit:    Medicare annual wellness visit, initial    Benign essential hypertension  -     Comprehensive metabolic panel; Future    /76  Continue metoprolol  Kidney function improved  Creatinine 1 04 and GFR 72  PVC's (premature ventricular contractions)  Continue to follow-up with cardiology  Coronary artery disease involving native heart without angina pectoris, unspecified vessel or lesion type  Continue to follow-up with cardiology  Obesity (BMI 30-39 9)  -     Comprehensive metabolic panel; Future  Patient instructed to eat a low fat diet  Leukocytosis, unspecified type  -     CBC and differential; Future  -     Ambulatory referral to Hematology / Oncology; Future    WBC 12 9  Patient reports that he feels fine  Patient referred to hematology for further evaluation  Patient wanted to repeat the CBC first  Discussed with the patient that his CBC has been elevated for awhile  Repeat CBC ordered  If still high, patient will follow-up with hematology  Chronic bilateral low back pain without sciatica  Continue back exercises  Elevated bilirubin  -     Comprehensive metabolic panel; Future    Adrenal nodule (HCC)  -     Metanephrine, Fractionated Plasma Free; Future    Encounter for prostate cancer screening  -     PSA, Total Screen; Future    Screening for colon cancer  -     Cologuard; Future    Lab work ordered to be done in 2 weeks  Will follow-up results with the patient  Patient instructed to follow-up in the office in 3 months or sooner prn  Subjective:     Patient ID: Sejal Joseph is a 68 y o  male  Patient is here for a follow-up for chronic medical conditions  Patient reports that he feels good  Denies any fever, SOB, wheezing, or cough  Patient reports that he had lab work done to recheck his kidney function  Patient follows up with cardiology for frequent PVCs, HTN, and CAD   Patient reports that he takes metoprolol and aspirin daily  Patient reports that he takes atorvastatin for hyperlipidemia  Denies any chest pain, SOB, palpitations, dizziness, or Has  Patient reports that he does exercises for chronic lower back pain  Patient reports that he takes tylenol OTC prn and it helps  Review of Systems   Constitutional: Negative for appetite change, chills and fever  HENT: Negative for congestion, ear pain and sore throat  Respiratory: Negative for cough, chest tightness, shortness of breath and wheezing  Cardiovascular: Negative for chest pain, palpitations and leg swelling  Gastrointestinal: Negative for abdominal pain, nausea and vomiting  Genitourinary: Negative for dysuria, frequency and hematuria  Musculoskeletal:        As noted in HPI  Skin: Negative for rash  Neurological: Negative for dizziness, light-headedness and headaches  Psychiatric/Behavioral: Negative for suicidal ideas  Denies any depression  Objective:     Physical Exam   Constitutional: He is oriented to person, place, and time  No distress  obese   HENT:   Right Ear: External ear normal    Left Ear: External ear normal    Mouth/Throat: Oropharynx is clear and moist    Tympanic membranes and ear canals wnl  Posterior pharynx wnl  Eyes: Pupils are equal, round, and reactive to light  Conjunctivae are normal    Cardiovascular: Normal rate, regular rhythm and normal heart sounds  No edema noted  Pulmonary/Chest: Effort normal and breath sounds normal  He has no wheezes  Musculoskeletal:   Gait wnl  Lymphadenopathy:     He has no cervical adenopathy  Neurological: He is alert and oriented to person, place, and time  Skin: No rash noted  Psychiatric: He has a normal mood and affect  Vitals reviewed

## 2019-12-11 NOTE — PROGRESS NOTES
Assessment and Plan:     Problem List Items Addressed This Visit        Cardiovascular and Mediastinum    Coronary artery disease  Continue to follow-up with cardiology  Benign essential hypertension  Stable  /76  Relevant Orders    Comprehensive metabolic panel    PVC's (premature ventricular contractions)  Continue to follow-up with cardiology  Other    Chronic bilateral low back pain without sciatica  Continue back exercises  Leukocytosis    Relevant Orders    CBC and differential    Ambulatory referral to Hematology / Oncology    Obesity (BMI 30-39  9)    Relevant Orders    Comprehensive metabolic panel    Medicare annual wellness visit, initial - Primary    Elevated bilirubin    Relevant Orders    Comprehensive metabolic panel    Adrenal nodule ([facility] [address] )    Relevant Orders    Metanephrine, Fractionated Plasma Free    Encounter for prostate cancer screening    Relevant Orders    PSA, Total Screen    Screening for colon cancer    Relevant Orders    Cologuard           Preventive health issues were discussed with patient, and age appropriate screening tests were ordered as noted in patient's After Visit Summary  Personalized health advice and appropriate referrals for health education or preventive services given if needed, as noted in patient's After Visit Summary  History of Present Illness:     Patient presents for Medicare Annual Wellness visit    Patient Care Team:  Juanita Broderick as PCP - General (Family Medicine)  MD Nabeel Lemon MD Lianne Madura, MD     Problem List:     Patient Active Problem List   Diagnosis    Chronic pain syndrome    Chronic bilateral low back pain without sciatica    Chronic back pain    Low back pain    Weakness    Coronary artery disease    Benign essential hypertension    NIECY (acute kidney injury) ([facility] [address] )    Leukocytosis    PVC's (premature ventricular contractions)    Irregular heart rhythm    Obesity (BMI 30-39  9)  Medicare annual wellness visit, initial    Elevated bilirubin    Adrenal nodule ([facility] [address] )    Encounter for prostate cancer screening    Screening for colon cancer      Past Medical and Surgical History:     Past Medical History:   Diagnosis Date    Arthritis     Coronary artery disease involving native coronary artery of native heart without angina pectoris     Edema, lower extremity     Last assessed 1/12/2015     Hyperlipidemia     Hypertension     Melanoma ([facility] [address] )     back, removed    Stented coronary artery      Past Surgical History:   Procedure Laterality Date    BILATERAL VATS ABLATION      CAROTID STENT      Transcath placement of intrathoracic carotid artery stent     MALIGNANT SKIN LESION EXCISION      Trunk     NERVE BLOCK Bilateral 12/28/2018    Procedure: L3 L4 L5 S1 Medical Branch Block #2 ([id number]); Surgeon: Varghese Blancas MD;  Location: [facility] MAIN OR;  Service: Pain Management     NERVE BLOCK Bilateral 12/21/2018    Procedure: L3 L4 L5 S1 Medial Branch Block #1 ([id number]);   Surgeon: Varghese Blancas MD;  Location: [facility] MAIN OR;  Service: Pain Management     RADIOFREQUENCY ABLATION Right 1/4/2019    Procedure: L3 L4 L5 S1 Radio Frequency Ablation;  Surgeon: Varghese Blancas MD;  Location: [facility] MAIN OR;  Service: Pain Management     RADIOFREQUENCY ABLATION Left 1/18/2019    Procedure: L3 L4 L5 S1 Radio Frequency Ablation;  Surgeon: Varghese Blancas MD;  Location: [facility] MAIN OR;  Service: Pain Management     SENTINEL LYMPH NODE BIOPSY        Family History:     Family History   Problem Relation Age of Onset   [address] ALS Mother     Prostate cancer Father     Pancreatic cancer Father     Breast cancer Sister     Arrhythmia Neg Hx     Clotting disorder Neg Hx     Fainting Neg Hx     Heart attack Neg Hx     Heart disease Neg Hx     Hypertension Neg Hx     Hyperlipidemia Neg Hx     Anuerysm Neg Hx     Stroke Neg Hx       Social History:     Social History Socioeconomic History    Marital status: /Civil Union     Spouse name: None    Number of children: None    Years of education: None    Highest education level: None   Occupational History    None   Social Needs    Financial resource strain: None    Food insecurity:     Worry: None     Inability: None    Transportation needs:     Medical: None     Non-medical: None   Tobacco Use    Smoking status: Former Smoker    Smokeless tobacco: Former User   Substance and Sexual Activity    Alcohol use: No    Drug use: No    Sexual activity: None   Lifestyle    Physical activity:     Days per week: None     Minutes per session: None    Stress: None   Relationships    Social connections:     Talks on phone: None     Gets together: None     Attends Yazidi service: None     Active member of club or organization: None     Attends meetings of clubs or organizations: None     Relationship status: None    Intimate partner violence:     Fear of current or ex partner: None     Emotionally abused: None     Physically abused: None     Forced sexual activity: None   Other Topics Concern    None   Social History Narrative    History of never a smoker, resolved 1/12/2015 - As per [employer]        Medications and Allergies:     Current Outpatient Medications   Medication Sig Dispense Refill    aspirin 1 mg/mL SUSP Take 162 mg by mouth   atorvastatin (LIPITOR) 40 mg tablet TAKE 1 TABLET BY MOUTH  EVERY DAY 90 tablet 3    metoprolol succinate (TOPROL-XL) 25 mg 24 hr tablet TAKE 1 TABLET BY MOUTH TWO  TIMES DAILY 180 tablet 2    nitroglycerin (NITROSTAT) 0 4 mg SL tablet Place 1 tablet under the tongue       No current facility-administered medications for this visit        Allergies   Allergen Reactions    Seasonal Ic  [Cholestatin]       Immunizations:     Immunization History   Administered Date(s) Administered     Influenza (IM) Preservative Free 11/12/2014    INFLUENZA 09/14/2014, 09/15/2018, 09/30/2019    Influenza Split High Dose Preservative Free IM 08/30/2015, 09/24/2016, 09/30/2017    Pneumococcal Conjugate 13-Valent 09/26/2015    Pneumococcal Polysaccharide PPV23 09/14/2014, 11/12/2014, 10/07/2017    Zoster 11/24/2013      Health Maintenance:         Topic Date Due    Hepatitis C Screening  1950    CRC Screening: Colonoscopy  1950     There are no preventive care reminders to display for this patient  Medicare Health Risk Assessment:     /76   Pulse 72   Resp 18   Ht 5' 10" (1 778 m)   Wt 124 kg (273 lb 9 6 oz)   SpO2 96%   BMI 39 26 kg/m²      Megan Orlando is here for his Initial Wellness visit  Health Risk Assessment:   Patient rates overall health as good  Patient feels that their physical health rating is same  Eyesight was rated as same  Hearing was rated as same  Patient feels that their emotional and mental health rating is same  Pain experienced in the last 7 days has been some  Patient's pain rating has been 4/10  Patient reports occasional lower back pain  Patient reports that he takes tylenol OTC prn and it helps  Depression Screening:   PHQ-2 Score: 0      Fall Risk Screening: In the past year, patient has experienced: no history of falling in past year      Home Safety:  Patient does not have trouble with stairs inside or outside of their home  Patient has working smoke alarms and has working carbon monoxide detector  Home safety hazards include: none  Nutrition:   Current diet is Regular and Limited junk food  Medications:   Patient is not currently taking any over-the-counter supplements  Patient is able to manage medications  Activities of Daily Living (ADLs)/Instrumental Activities of Daily Living (IADLs):   Walk and transfer into and out of bed and chair?: Yes  Dress and groom yourself?: Yes    Bathe or shower yourself?: Yes    Feed yourself?  Yes  Do your laundry/housekeeping?: Yes  Manage your money, pay your bills and track your expenses?: Yes  Make your own meals?: Yes    Do your own shopping?: Yes    Previous Hospitalizations:   Any hospitalizations or ED visits within the last 12 months?: Yes    How many hospitalizations have you had in the last year?: 1-2    Hospitalization Comments: Patient was recently hospitalized for acute kidney injury  Advance Care Planning:   Living will: Yes    Durable POA for healthcare: Yes    Advanced directive: Yes      Cognitive Screening:   Provider or family/friend/caregiver concerned regarding cognition?: No    PREVENTIVE SCREENINGS      Cardiovascular Screening:    General: Screening Current      Diabetes Screening:     General: Screening Current      Colorectal Cancer Screening:     General: Risks and Benefits Discussed    Due for: Cologuard      Prostate Cancer Screening:    General: Risks and Benefits Discussed    Due for: PSA      Osteoporosis Screening:    General: Risks and Benefits Discussed and Patient Declines      Abdominal Aortic Aneurysm (AAA) Screening:    Risk factors include: age between 73-67 yo and tobacco use        General: Risks and Benefits Discussed and Patient Declines      Lung Cancer Screening:     General: Screening Current      Hepatitis C Screening:    General: Screening Current      Preventive Screening Comments: Hepatitis C screening is current per patient report  Other Counseling Topics:   Car/seat belt/driving safety, skin self-exam and sunscreen         Rosa Elena Botello

## 2019-12-17 ENCOUNTER — TELEPHONE (OUTPATIENT)
Dept: HEMATOLOGY ONCOLOGY | Facility: CLINIC | Age: 69
End: 2019-12-17

## 2019-12-17 NOTE — TELEPHONE ENCOUNTER
Called and spoke with patient  He said he is not ready to make an appointment as of yet as he is having more testing done  Will call back after testing to schedule

## 2019-12-26 ENCOUNTER — TELEPHONE (OUTPATIENT)
Dept: HEMATOLOGY ONCOLOGY | Facility: CLINIC | Age: 69
End: 2019-12-26

## 2019-12-30 ENCOUNTER — TELEPHONE (OUTPATIENT)
Dept: FAMILY MEDICINE CLINIC | Facility: CLINIC | Age: 69
End: 2019-12-30

## 2020-01-03 ENCOUNTER — APPOINTMENT (OUTPATIENT)
Dept: LAB | Facility: CLINIC | Age: 70
End: 2020-01-03
Payer: MEDICARE

## 2020-01-03 ENCOUNTER — TRANSCRIBE ORDERS (OUTPATIENT)
Dept: LAB | Facility: CLINIC | Age: 70
End: 2020-01-03

## 2020-01-03 DIAGNOSIS — E66.9 OBESITY (BMI 30-39.9): ICD-10-CM

## 2020-01-03 DIAGNOSIS — Z12.5 ENCOUNTER FOR PROSTATE CANCER SCREENING: ICD-10-CM

## 2020-01-03 DIAGNOSIS — R17 ELEVATED BILIRUBIN: ICD-10-CM

## 2020-01-03 DIAGNOSIS — I10 BENIGN ESSENTIAL HYPERTENSION: ICD-10-CM

## 2020-01-03 DIAGNOSIS — E27.8 ADRENAL NODULE (HCC): ICD-10-CM

## 2020-01-03 DIAGNOSIS — D72.829 LEUKOCYTOSIS, UNSPECIFIED TYPE: ICD-10-CM

## 2020-01-03 LAB
ALBUMIN SERPL BCP-MCNC: 3.6 G/DL (ref 3.5–5)
ALP SERPL-CCNC: 82 U/L (ref 46–116)
ALT SERPL W P-5'-P-CCNC: 29 U/L (ref 12–78)
ANION GAP SERPL CALCULATED.3IONS-SCNC: 3 MMOL/L (ref 4–13)
AST SERPL W P-5'-P-CCNC: 14 U/L (ref 5–45)
BASOPHILS # BLD AUTO: 0.05 THOUSANDS/ΜL (ref 0–0.1)
BASOPHILS NFR BLD AUTO: 1 % (ref 0–1)
BILIRUB SERPL-MCNC: 1.46 MG/DL (ref 0.2–1)
BUN SERPL-MCNC: 22 MG/DL (ref 5–25)
CALCIUM SERPL-MCNC: 9.4 MG/DL (ref 8.3–10.1)
CHLORIDE SERPL-SCNC: 107 MMOL/L (ref 100–108)
CO2 SERPL-SCNC: 32 MMOL/L (ref 21–32)
CREAT SERPL-MCNC: 1.02 MG/DL (ref 0.6–1.3)
EOSINOPHIL # BLD AUTO: 0.27 THOUSAND/ΜL (ref 0–0.61)
EOSINOPHIL NFR BLD AUTO: 3 % (ref 0–6)
ERYTHROCYTE [DISTWIDTH] IN BLOOD BY AUTOMATED COUNT: 14.5 % (ref 11.6–15.1)
GFR SERPL CREATININE-BSD FRML MDRD: 74 ML/MIN/1.73SQ M
GLUCOSE P FAST SERPL-MCNC: 85 MG/DL (ref 65–99)
HCT VFR BLD AUTO: 45.1 % (ref 36.5–49.3)
HGB BLD-MCNC: 13.9 G/DL (ref 12–17)
IMM GRANULOCYTES # BLD AUTO: 0.03 THOUSAND/UL (ref 0–0.2)
IMM GRANULOCYTES NFR BLD AUTO: 0 % (ref 0–2)
LYMPHOCYTES # BLD AUTO: 3.33 THOUSANDS/ΜL (ref 0.6–4.47)
LYMPHOCYTES NFR BLD AUTO: 32 % (ref 14–44)
MCH RBC QN AUTO: 30.4 PG (ref 26.8–34.3)
MCHC RBC AUTO-ENTMCNC: 30.8 G/DL (ref 31.4–37.4)
MCV RBC AUTO: 99 FL (ref 82–98)
MONOCYTES # BLD AUTO: 0.83 THOUSAND/ΜL (ref 0.17–1.22)
MONOCYTES NFR BLD AUTO: 8 % (ref 4–12)
NEUTROPHILS # BLD AUTO: 6.02 THOUSANDS/ΜL (ref 1.85–7.62)
NEUTS SEG NFR BLD AUTO: 56 % (ref 43–75)
NRBC BLD AUTO-RTO: 0 /100 WBCS
PLATELET # BLD AUTO: 224 THOUSANDS/UL (ref 149–390)
PMV BLD AUTO: 9.7 FL (ref 8.9–12.7)
POTASSIUM SERPL-SCNC: 4.8 MMOL/L (ref 3.5–5.3)
PROT SERPL-MCNC: 7.1 G/DL (ref 6.4–8.2)
PSA SERPL-MCNC: 1 NG/ML (ref 0–4)
RBC # BLD AUTO: 4.57 MILLION/UL (ref 3.88–5.62)
SODIUM SERPL-SCNC: 142 MMOL/L (ref 136–145)
WBC # BLD AUTO: 10.53 THOUSAND/UL (ref 4.31–10.16)

## 2020-01-03 PROCEDURE — 85025 COMPLETE CBC W/AUTO DIFF WBC: CPT

## 2020-01-03 PROCEDURE — 36415 COLL VENOUS BLD VENIPUNCTURE: CPT

## 2020-01-03 PROCEDURE — G0103 PSA SCREENING: HCPCS

## 2020-01-03 PROCEDURE — 83835 ASSAY OF METANEPHRINES: CPT

## 2020-01-03 PROCEDURE — 80053 COMPREHEN METABOLIC PANEL: CPT

## 2020-01-07 LAB
METANEPH FREE SERPL-MCNC: 21 PG/ML (ref 0–62)
NORMETANEPHRINE SERPL-MCNC: 77 PG/ML (ref 0–145)

## 2020-01-15 ENCOUNTER — OFFICE VISIT (OUTPATIENT)
Dept: FAMILY MEDICINE CLINIC | Facility: CLINIC | Age: 70
End: 2020-01-15
Payer: MEDICARE

## 2020-01-15 VITALS
BODY MASS INDEX: 40.43 KG/M2 | HEIGHT: 70 IN | RESPIRATION RATE: 18 BRPM | OXYGEN SATURATION: 97 % | WEIGHT: 282.4 LBS | HEART RATE: 90 BPM | DIASTOLIC BLOOD PRESSURE: 78 MMHG | SYSTOLIC BLOOD PRESSURE: 120 MMHG

## 2020-01-15 DIAGNOSIS — R17 ELEVATED BILIRUBIN: ICD-10-CM

## 2020-01-15 DIAGNOSIS — E27.8 ADRENAL NODULE (HCC): ICD-10-CM

## 2020-01-15 DIAGNOSIS — I10 BENIGN ESSENTIAL HYPERTENSION: ICD-10-CM

## 2020-01-15 DIAGNOSIS — I25.10 CORONARY ARTERY DISEASE INVOLVING NATIVE HEART WITHOUT ANGINA PECTORIS, UNSPECIFIED VESSEL OR LESION TYPE: ICD-10-CM

## 2020-01-15 DIAGNOSIS — D72.829 LEUKOCYTOSIS, UNSPECIFIED TYPE: Primary | ICD-10-CM

## 2020-01-15 PROBLEM — N17.9 AKI (ACUTE KIDNEY INJURY) (HCC): Status: RESOLVED | Noted: 2019-11-06 | Resolved: 2020-01-15

## 2020-01-15 PROCEDURE — 99214 OFFICE O/P EST MOD 30 MIN: CPT | Performed by: NURSE PRACTITIONER

## 2020-01-15 NOTE — PROGRESS NOTES
Assessment/Plan:      Diagnoses and all orders for this visit:    Leukocytosis, unspecified type  -     Ambulatory referral to Hematology / Oncology; Future    Patient referred to hematology for continued elevated WBC  Patient reports that he feels well  Elevated bilirubin  -     Ambulatory referral to Gastroenterology; Future  Bilirubin 1 46  AST and ALT normal  Patient referred to GI for further evaluation  Benign essential hypertension  Continue to follow-up with cardiology  Coronary artery disease involving native heart without angina pectoris, unspecified vessel or lesion type  Continue to follow-up with cardiology  Adrenal nodule ([facility] [address] )  -     Ambulatory referral to Hematology / Oncology; Future    Patient instructed to follow-up with his specialists  Patient instructed to follow-up with our office in 6 months or sooner prn  Subjective:     Patient ID: Mainor Pastrana is a 68 y o  male  Patient is here for a follow-up to review lab results  Patient reports that he feels well  Denies any chest pain, SOB, palpitations, dizziness, cough, or wheezing  Patient follows up with cardiology for HTN, CAD, and hyperlipidemia  Patient reports that he takes metoprolol for HTN and atorvastatin for hyperlipidemia daily  Denies any chest pain or SoB  Review of Systems   Constitutional: Negative for chills and fever  Respiratory: Negative for cough, chest tightness, shortness of breath and wheezing  Cardiovascular: Negative for chest pain, palpitations and leg swelling  Gastrointestinal: Negative for abdominal pain, diarrhea, nausea and vomiting  Genitourinary: Negative for dysuria, frequency and hematuria  Skin: Negative for rash  Neurological: Negative for dizziness, seizures, syncope, light-headedness and headaches  Objective:     Physical Exam   Constitutional: He is oriented to person, place, and time  No distress     HENT:   Right Ear: External ear normal    Left Ear: External ear normal    Mouth/Throat: Oropharynx is clear and moist    Cardiovascular: Normal rate, regular rhythm and normal heart sounds  Radial pulses +2 bilaterally  No edema noted  Pulmonary/Chest: Effort normal and breath sounds normal    Neurological: He is alert and oriented to person, place, and time  Skin: No rash noted  Psychiatric: He has a normal mood and affect  Vitals reviewed

## 2020-01-16 ENCOUNTER — TELEPHONE (OUTPATIENT)
Dept: SURGICAL ONCOLOGY | Facility: CLINIC | Age: 70
End: 2020-01-16

## 2020-01-16 NOTE — TELEPHONE ENCOUNTER
New Patient Encounter    New Patient Intake Form   Patient Details:  Ethel Gannon  1950  [id number]    Background Information:  [address] starts by opening a telephone encounter and gathering the following information   Who is calling to schedule? If not self, relationship to patient? Kyle Porter   Referring Provider Rocío Primus   What is the diagnosis? leukocytosis   Is this diagnosis confirmed Yes   Is there a confirmed diagnosis from a biopsy/tissue reviewed by pathology? No   Is there any prior history of Cancer? No   If yes, please explain    When was the diagnosis? 1/2020   Is patient aware of diagnosis? Yes   Reason for visit? NP DX   Have you had any testing done? If so: when, where? Yes   Are records in [facility]? yes   Was the patient told to bring a disk? no   Scheduling Information:   Preferred [place]: [facility]     Requesting Specific Provider? Adela Rodriguez   Are there any dates/time the patient cannot be seen? no      Miscellaneous: n/a   After completing the above information, please route to Financial Counselor and the appropriate Nurse Navigator for review

## 2020-02-24 ENCOUNTER — CONSULT (OUTPATIENT)
Dept: HEMATOLOGY ONCOLOGY | Facility: CLINIC | Age: 70
End: 2020-02-24
Payer: MEDICARE

## 2020-02-24 VITALS
BODY MASS INDEX: 40.94 KG/M2 | HEIGHT: 70 IN | HEART RATE: 75 BPM | DIASTOLIC BLOOD PRESSURE: 90 MMHG | WEIGHT: 286 LBS | TEMPERATURE: 98.6 F | RESPIRATION RATE: 18 BRPM | SYSTOLIC BLOOD PRESSURE: 140 MMHG

## 2020-02-24 DIAGNOSIS — D72.829 LEUKOCYTOSIS, UNSPECIFIED TYPE: ICD-10-CM

## 2020-02-24 DIAGNOSIS — E27.8 ADRENAL NODULE (HCC): ICD-10-CM

## 2020-02-24 PROCEDURE — 99204 OFFICE O/P NEW MOD 45 MIN: CPT | Performed by: INTERNAL MEDICINE

## 2020-02-24 NOTE — PROGRESS NOTES
Oncology Consult Note  Sergey Azul 68 y o  male MRN: [id number]  Unit/Bed#:  Encounter: [id number]      Presenting Complaint: leukocytosis    History of Presenting Illness:  33-year-old  male with history of a degenerative arthritis, chronic lower back pain, postnasal drip, aspiration pneumonitis, benign hypertension, PVC, coronary artery disease status post angioplasty, stage I melanoma of the back status post resection, obesity, chronic pain syndrome, dyslipidemia was noticed to have mildly elevated WBC with normal differential, hemoglobin, platelets    On January 2020 WBC 10 5, hemoglobin 13 9, MCV 99, platelets [id number], 90% neutrophils, 32% lymphocytes, 8% monocytes    On 11/2019 WBC 11 0, hemoglobin 11 9, MCV 96, platelets [id number]    I do not have any previous CBC on this patient     He quit smoking 20 years ago, he quit drinking 8 years ago    Denies any headache blurred vision odynophagia dysphagia angina abdominal pain dysuria hematuria melena hematochezia he reported skin rash intermittently, wheezing in the morning with chronic cough in the morning         Review of Systems - As stated in the HPI otherwise the fourteen point review of systems was negative      Past Medical History:   Diagnosis Date    Arthritis     Coronary artery disease involving native coronary artery of native heart without angina pectoris     Edema, lower extremity     Last assessed 1/12/2015     Hyperlipidemia     Hypertension     Melanoma ([address] )     back, removed    Stented coronary artery        Social History     Socioeconomic History    Marital status: /Civil Union     Spouse name: None    Number of children: None    Years of education: None    Highest education level: None   Occupational History    None   Social Needs    Financial resource strain: None    Food insecurity:     Worry: None     Inability: None    Transportation needs:     Medical: None     Non-medical: None   Tobacco Use    Smoking status: Former Smoker    Smokeless tobacco: Former User   Substance and Sexual Activity    Alcohol use: No    Drug use: No    Sexual activity: None   Lifestyle    Physical activity:     Days per week: None     Minutes per session: None    Stress: None   Relationships    Social connections:     Talks on phone: None     Gets together: None     Attends Druze service: None     Active member of club or organization: None     Attends meetings of clubs or organizations: None     Relationship status: None    Intimate partner violence:     Fear of current or ex partner: None     Emotionally abused: None     Physically abused: None     Forced sexual activity: None   Other Topics Concern    None   Social History Narrative    History of never a smoker, resolved 1/12/2015 - As per [employer]        Family History   Problem Relation Age of Onset   [facility] ALS Mother     Prostate cancer Father     Pancreatic cancer Father     Breast cancer Sister     Arrhythmia Neg Hx     Clotting disorder Neg Hx     Fainting Neg Hx     Heart attack Neg Hx     Heart disease Neg Hx     Hypertension Neg Hx     Hyperlipidemia Neg Hx     Anuerysm Neg Hx     Stroke Neg Hx        Allergies   Allergen Reactions    Seasonal Ic  [Cholestatin]          Current Outpatient Medications:     aspirin 1 mg/mL SUSP, Take 162 mg by mouth , Disp: , Rfl:     atorvastatin (LIPITOR) 40 mg tablet, TAKE 1 TABLET BY MOUTH  EVERY DAY, Disp: 90 tablet, Rfl: 3    metoprolol succinate (TOPROL-XL) 25 mg 24 hr tablet, TAKE 1 TABLET BY MOUTH TWO  TIMES DAILY, Disp: 180 tablet, Rfl: 2    nitroglycerin (NITROSTAT) 0 4 mg SL tablet, Place 1 tablet under the tongue, Disp: , Rfl:       /90 (BP Location: Right arm, Patient Position: Sitting, Cuff Size: Standard)   Pulse 75   Temp 98 6 °F (37 °C)   Resp 18   Ht 5' 10" (1 778 m)   Wt 130 kg (286 lb)   BMI 41 04 kg/m²       General Appearance:    Alert, oriented, obese        Eyes:    PERRL   Ears: Normal external ear canals, both ears   Nose:   Nares normal, septum midline   Throat:   Mucosa moist  Pharynx without injection  Neck:   Supple       Lungs:     Clear to auscultation bilaterally   Chest Wall:    No tenderness or deformity    Heart:    Regular rate and rhythm       Abdomen:     Soft, non-tender, bowel sounds +, no organomegaly           Extremities:   Extremities no cyanosis or edema advanced arthritis       Skin:   no rash or icterus  Lymph nodes:   Cervical, supraclavicular, and axillary nodes normal   Neurologic:   CNII-XII intact, normal strength, sensation and reflexes     Throughout               No results found for this or any previous visit (from the past 48 hour(s))  No results found    ECOG : 1      Assessment and plan:   leukocytosis, with WBC in the range of 10,000, it was 12,000 in November 2019   I do not have any previous CBC on the patient, he has normal hemoglobin, platelets, differential    The patient told me that he has intermittent nasal drip with aspiration, he coughs in the morning yellow he might have also chronic bronchitis from previous smoking    His WBC is trending down slowly    He has chronic spinal stenosis, chronic pain syndrome, advanced arthritis    He does not smoke or drink    I believe at this time watchful observation and repeat CBC and differential in your office in 6-12 months is good idea    He might have also Dental decay that contribute to elevation of WBC

## 2020-06-09 DIAGNOSIS — I25.10 CORONARY ARTERY DISEASE INVOLVING NATIVE CORONARY ARTERY OF NATIVE HEART WITHOUT ANGINA PECTORIS: ICD-10-CM

## 2020-06-09 DIAGNOSIS — I25.10 CAD (CORONARY ARTERY DISEASE): ICD-10-CM

## 2020-06-09 RX ORDER — ATORVASTATIN CALCIUM 40 MG/1
TABLET, FILM COATED ORAL
Qty: 90 TABLET | Refills: 3 | Status: SHIPPED | OUTPATIENT
Start: 2020-06-09 | End: 2021-06-01

## 2020-06-09 RX ORDER — METOPROLOL SUCCINATE 25 MG/1
25 TABLET, EXTENDED RELEASE ORAL 2 TIMES DAILY
Qty: 180 TABLET | Refills: 2 | Status: SHIPPED | OUTPATIENT
Start: 2020-06-09 | End: 2021-03-18

## 2020-07-07 ENCOUNTER — TELEPHONE (OUTPATIENT)
Dept: FAMILY MEDICINE CLINIC | Facility: CLINIC | Age: 70
End: 2020-07-07

## 2020-07-07 NOTE — TELEPHONE ENCOUNTER
Lavonne called to see if you wanted to order labs for the patient's upcoming appointment next week?  Please call back

## 2020-07-15 ENCOUNTER — OFFICE VISIT (OUTPATIENT)
Dept: FAMILY MEDICINE CLINIC | Facility: CLINIC | Age: 70
End: 2020-07-15
Payer: MEDICARE

## 2020-07-15 VITALS
RESPIRATION RATE: 18 BRPM | WEIGHT: 281 LBS | OXYGEN SATURATION: 98 % | HEIGHT: 70 IN | DIASTOLIC BLOOD PRESSURE: 82 MMHG | SYSTOLIC BLOOD PRESSURE: 125 MMHG | BODY MASS INDEX: 40.23 KG/M2 | HEART RATE: 64 BPM | TEMPERATURE: 98.2 F

## 2020-07-15 DIAGNOSIS — I25.10 CORONARY ARTERY DISEASE INVOLVING NATIVE HEART WITHOUT ANGINA PECTORIS, UNSPECIFIED VESSEL OR LESION TYPE: ICD-10-CM

## 2020-07-15 DIAGNOSIS — I10 BENIGN ESSENTIAL HYPERTENSION: ICD-10-CM

## 2020-07-15 DIAGNOSIS — E66.01 MORBID OBESITY (HCC): ICD-10-CM

## 2020-07-15 DIAGNOSIS — R17 ELEVATED BILIRUBIN: ICD-10-CM

## 2020-07-15 DIAGNOSIS — D72.829 LEUKOCYTOSIS, UNSPECIFIED TYPE: ICD-10-CM

## 2020-07-15 DIAGNOSIS — R09.82 POST-NASAL DRIP: Primary | ICD-10-CM

## 2020-07-15 PROCEDURE — 1036F TOBACCO NON-USER: CPT | Performed by: NURSE PRACTITIONER

## 2020-07-15 PROCEDURE — 4040F PNEUMOC VAC/ADMIN/RCVD: CPT | Performed by: NURSE PRACTITIONER

## 2020-07-15 PROCEDURE — 1160F RVW MEDS BY RX/DR IN RCRD: CPT | Performed by: NURSE PRACTITIONER

## 2020-07-15 PROCEDURE — 3079F DIAST BP 80-89 MM HG: CPT | Performed by: NURSE PRACTITIONER

## 2020-07-15 PROCEDURE — 3074F SYST BP LT 130 MM HG: CPT | Performed by: NURSE PRACTITIONER

## 2020-07-15 PROCEDURE — 3008F BODY MASS INDEX DOCD: CPT | Performed by: NURSE PRACTITIONER

## 2020-07-15 PROCEDURE — 99214 OFFICE O/P EST MOD 30 MIN: CPT | Performed by: NURSE PRACTITIONER

## 2020-07-15 RX ORDER — FLUTICASONE PROPIONATE 50 MCG
1 SPRAY, SUSPENSION (ML) NASAL DAILY
Qty: 1 BOTTLE | Refills: 1 | Status: SHIPPED | OUTPATIENT
Start: 2020-07-15

## 2020-07-15 NOTE — PROGRESS NOTES
Assessment/Plan:      Diagnoses and all orders for this visit:    Post-nasal drip  -     fluticasone (FLONASE) 50 mcg/act nasal spray; 1 spray into each nostril daily    Fluticasone prescribed for chronic post nasal drip  Patient instructed to follow-up if symptoms get worse or do not get better  Benign essential hypertension  -     Comprehensive metabolic panel; Future  -     CBC and differential; Future  -     TSH, 3rd generation with Free T4 reflex; Future    Stable  Continue to follow-up with cardiology  Morbid [facility] ([facility] )  -     Comprehensive metabolic panel; Future  -     CBC and differential; Future  -     TSH, 3rd generation with Free T4 reflex; Future    Patient instructed to exercise on a regular basis and to eat a low fat diet  Leukocytosis, unspecified type  -     CBC and differential; Future    Patient followed up with hematology and they suggested that his PCP continue to monitor his WBC  CBC ordered  Elevated bilirubin  -     Comprehensive metabolic panel; Future    Coronary artery disease involving native heart without angina pectoris, unspecified vessel or lesion type  Continue to follow-up with cardiology  Lab work ordered  Will follow-up results with the patient  Patient instructed to follow-up in 6 months or sooner prn  Subjective:     Patient ID: Casey Lobo is a 68 y o  male  Patient is here for a follow-up for chronic medical conditions  Patient reports post nasal drip for years  Denies any fever  Patient reports occasional nasal congestion and rhinitis  Denies any cough, sore throat, earache, vomiting, or diarrhea  Patient reports that he would like a prescription for the post nasal drip  Patient reports that his symptoms are not going away  Patient follows up with the cardiology for HTN and CAD  Patient reports that he takes atorvastatin and metoprolol as prescribed  Denies any chest pain, SOB, palpitations, dizziness, or Has     Patient followed up with the hematologist for leukocytosis  Hematologist wanted us to continue to monitor his CBC  Patient reports that he never followed up with GI for elevated bilirubin  Review of Systems   Constitutional: Negative for chills and fever  HENT:        As noted in HPI  Eyes: Negative for pain, discharge and redness  Respiratory: Negative for cough, chest tightness, shortness of breath and wheezing  Cardiovascular: Negative for chest pain, palpitations and leg swelling  Gastrointestinal: Negative for abdominal pain, diarrhea, nausea and vomiting  Genitourinary: Negative for dysuria, frequency and hematuria  Skin: Negative for rash  Neurological: Negative for dizziness, seizures, syncope, light-headedness and headaches  Objective:     Physical Exam   Constitutional: He is oriented to person, place, and time  He does not appear ill  No distress  HENT:   Right Ear: External ear normal    Left Ear: External ear normal    Mouth/Throat: Oropharynx is clear and moist    Tympanic membranes and ear canals wnl  Clear nasal discharge noted  Eyes: Pupils are equal, round, and reactive to light  Conjunctivae are normal    Cardiovascular: Normal rate, regular rhythm and normal heart sounds  No edema noted  Pulmonary/Chest: Effort normal and breath sounds normal  No respiratory distress  He has no wheezes  Musculoskeletal:   Gait wnl  Neurological: He is alert and oriented to person, place, and time  Skin: No rash noted  Psychiatric: He has a normal mood and affect  Vitals reviewed  BMI Counseling: Body mass index is 40 32 kg/m²  The BMI is above normal  Nutrition recommendations include 3-5 servings of fruits/vegetables daily, reducing intake of saturated fat and trans fat and reducing intake of cholesterol  Exercise recommendations include exercising 3-5 times per week

## 2020-07-16 ENCOUNTER — APPOINTMENT (OUTPATIENT)
Dept: LAB | Facility: CLINIC | Age: 70
End: 2020-07-16
Payer: MEDICARE

## 2020-07-16 DIAGNOSIS — I10 BENIGN ESSENTIAL HYPERTENSION: ICD-10-CM

## 2020-07-16 DIAGNOSIS — E66.01 MORBID OBESITY (HCC): ICD-10-CM

## 2020-07-16 DIAGNOSIS — D72.829 LEUKOCYTOSIS, UNSPECIFIED TYPE: ICD-10-CM

## 2020-07-16 DIAGNOSIS — R17 ELEVATED BILIRUBIN: ICD-10-CM

## 2020-07-16 LAB
ALBUMIN SERPL BCP-MCNC: 3.8 G/DL (ref 3.5–5)
ALP SERPL-CCNC: 89 U/L (ref 46–116)
ALT SERPL W P-5'-P-CCNC: 25 U/L (ref 12–78)
ANION GAP SERPL CALCULATED.3IONS-SCNC: 5 MMOL/L (ref 4–13)
AST SERPL W P-5'-P-CCNC: 19 U/L (ref 5–45)
BASOPHILS # BLD AUTO: 0.06 THOUSANDS/ΜL (ref 0–0.1)
BASOPHILS NFR BLD AUTO: 1 % (ref 0–1)
BILIRUB SERPL-MCNC: 2.05 MG/DL (ref 0.2–1)
BUN SERPL-MCNC: 20 MG/DL (ref 5–25)
CALCIUM SERPL-MCNC: 9.5 MG/DL (ref 8.3–10.1)
CHLORIDE SERPL-SCNC: 105 MMOL/L (ref 100–108)
CO2 SERPL-SCNC: 32 MMOL/L (ref 21–32)
CREAT SERPL-MCNC: 1.08 MG/DL (ref 0.6–1.3)
EOSINOPHIL # BLD AUTO: 0.29 THOUSAND/ΜL (ref 0–0.61)
EOSINOPHIL NFR BLD AUTO: 2 % (ref 0–6)
ERYTHROCYTE [DISTWIDTH] IN BLOOD BY AUTOMATED COUNT: 13.1 % (ref 11.6–15.1)
GFR SERPL CREATININE-BSD FRML MDRD: 69 ML/MIN/1.73SQ M
GLUCOSE P FAST SERPL-MCNC: 74 MG/DL (ref 65–99)
HCT VFR BLD AUTO: 53.1 % (ref 36.5–49.3)
HGB BLD-MCNC: 16.6 G/DL (ref 12–17)
IMM GRANULOCYTES # BLD AUTO: 0.03 THOUSAND/UL (ref 0–0.2)
IMM GRANULOCYTES NFR BLD AUTO: 0 % (ref 0–2)
LYMPHOCYTES # BLD AUTO: 2.97 THOUSANDS/ΜL (ref 0.6–4.47)
LYMPHOCYTES NFR BLD AUTO: 23 % (ref 14–44)
MCH RBC QN AUTO: 31.7 PG (ref 26.8–34.3)
MCHC RBC AUTO-ENTMCNC: 31.3 G/DL (ref 31.4–37.4)
MCV RBC AUTO: 101 FL (ref 82–98)
MONOCYTES # BLD AUTO: 0.86 THOUSAND/ΜL (ref 0.17–1.22)
MONOCYTES NFR BLD AUTO: 7 % (ref 4–12)
NEUTROPHILS # BLD AUTO: 8.51 THOUSANDS/ΜL (ref 1.85–7.62)
NEUTS SEG NFR BLD AUTO: 67 % (ref 43–75)
NRBC BLD AUTO-RTO: 0 /100 WBCS
PLATELET # BLD AUTO: 264 THOUSANDS/UL (ref 149–390)
PMV BLD AUTO: 10.2 FL (ref 8.9–12.7)
POTASSIUM SERPL-SCNC: 4.8 MMOL/L (ref 3.5–5.3)
PROT SERPL-MCNC: 7.7 G/DL (ref 6.4–8.2)
RBC # BLD AUTO: 5.24 MILLION/UL (ref 3.88–5.62)
SODIUM SERPL-SCNC: 142 MMOL/L (ref 136–145)
TSH SERPL DL<=0.05 MIU/L-ACNC: 1.38 UIU/ML (ref 0.36–3.74)
WBC # BLD AUTO: 12.72 THOUSAND/UL (ref 4.31–10.16)

## 2020-07-16 PROCEDURE — 84443 ASSAY THYROID STIM HORMONE: CPT

## 2020-07-16 PROCEDURE — 80053 COMPREHEN METABOLIC PANEL: CPT

## 2020-07-16 PROCEDURE — 85025 COMPLETE CBC W/AUTO DIFF WBC: CPT

## 2020-07-16 PROCEDURE — 36415 COLL VENOUS BLD VENIPUNCTURE: CPT

## 2020-07-17 DIAGNOSIS — E27.8 ADRENAL NODULE (HCC): Primary | ICD-10-CM

## 2020-07-20 ENCOUNTER — TELEPHONE (OUTPATIENT)
Dept: FAMILY MEDICINE CLINIC | Facility: CLINIC | Age: 70
End: 2020-07-20

## 2020-08-05 ENCOUNTER — HOSPITAL ENCOUNTER (OUTPATIENT)
Dept: CT IMAGING | Facility: HOSPITAL | Age: 70
Discharge: HOME | End: 2020-08-05
Payer: MEDICARE

## 2020-08-05 ENCOUNTER — TELEPHONE (OUTPATIENT)
Dept: GASTROENTEROLOGY | Facility: AMBULARY SURGERY CENTER | Age: 70
End: 2020-08-05

## 2020-08-05 DIAGNOSIS — E27.8 ADRENAL NODULE (HCC): ICD-10-CM

## 2020-08-05 PROCEDURE — 74170 CT ABD WO CNTRST FLWD CNTRST: CPT

## 2020-08-05 RX ADMIN — IOHEXOL 100 ML: 350 INJECTION, SOLUTION INTRAVENOUS at 17:15

## 2020-08-06 ENCOUNTER — APPOINTMENT (OUTPATIENT)
Dept: LAB | Facility: CLINIC | Age: 70
End: 2020-08-06
Payer: MEDICARE

## 2020-08-06 ENCOUNTER — OFFICE VISIT (OUTPATIENT)
Dept: GASTROENTEROLOGY | Facility: AMBULARY SURGERY CENTER | Age: 70
End: 2020-08-06
Payer: MEDICARE

## 2020-08-06 ENCOUNTER — TRANSCRIBE ORDERS (OUTPATIENT)
Dept: LAB | Facility: CLINIC | Age: 70
End: 2020-08-06

## 2020-08-06 VITALS
TEMPERATURE: 97.6 F | BODY MASS INDEX: 40.37 KG/M2 | DIASTOLIC BLOOD PRESSURE: 70 MMHG | WEIGHT: 282 LBS | HEIGHT: 70 IN | SYSTOLIC BLOOD PRESSURE: 136 MMHG

## 2020-08-06 DIAGNOSIS — R17 ELEVATED BILIRUBIN: ICD-10-CM

## 2020-08-06 LAB
ALBUMIN SERPL BCP-MCNC: 3.8 G/DL (ref 3.5–5)
ALP SERPL-CCNC: 80 U/L (ref 46–116)
ALT SERPL W P-5'-P-CCNC: 26 U/L (ref 12–78)
AST SERPL W P-5'-P-CCNC: 19 U/L (ref 5–45)
BILIRUB DIRECT SERPL-MCNC: 0.35 MG/DL (ref 0–0.2)
BILIRUB SERPL-MCNC: 1.66 MG/DL (ref 0.2–1)
PROT SERPL-MCNC: 7.3 G/DL (ref 6.4–8.2)

## 2020-08-06 PROCEDURE — 80076 HEPATIC FUNCTION PANEL: CPT

## 2020-08-06 PROCEDURE — 99204 OFFICE O/P NEW MOD 45 MIN: CPT | Performed by: INTERNAL MEDICINE

## 2020-08-06 PROCEDURE — 36415 COLL VENOUS BLD VENIPUNCTURE: CPT

## 2020-08-06 NOTE — LETTER
August 6, 2020     ANTONIO Wynn  [address]  [place] [address]    Patient: Danica Lyons   YOB: 1950   Date of Visit: 8/6/2020       Dear Dr Chucho Montenegro: Thank you for referring Danica Lyons to me for evaluation  Below are my notes for this consultation  If you have questions, please do not hesitate to call me  I look forward to following your patient along with you  Sincerely,        Seda Mayorga MD        CC: No Recipients  Seda Mayorga MD  8/6/2020 11:56 PM  Sign when Signing Visit  Consultation - [facility]  Danica Lyons 68 y o  male MRN: [id number]          Assessment & Plan:    Pleasant 78-year-old gentle with history of coronary disease, recent laboratory studies demonstrated mild elevation of bilirubin  1  Elevated bilirubin:  Unclear etiology, 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  -remainder of his LFTs are normal  -no recent medications, no history of recent alcohol use   -recommended the patient check laboratory studies in a nonfasting state, will check hepatic function panel to evaluate for direct and indirect bilirubin  -if his LFTs remain elevated will check a right upper quadrant ultrasound and make further recommendations after    2  Colon cancer screening  -patient is up-to-date, had recent stool DNA test  -we can see the patient back in 1 year to discuss repeat evaluation at that time            _____________________________________________________________        CC:  Evaluation for elevated bilirubin    HPI:  Danica Lyons is a 68 y o male who was referred for evaluation of elevated bilirubin  As you know this is a pleasant 78-year-old gentleman with history of coronary disease, currently treated for hypertension, hyperlipidemia  Patient has a history of cardiac stents  He has a history of malignant melanoma which was resected in 2009   Patient reports that he had a stool DNA test in the recent past which was normal     Recent laboratory studies demonstrated mild elevation of bilirubin for which she was referred here patient had a CT scan to follow-up on this which was normal     Patient denies any prior history of liver disease, jaundice, abdominal pain  He denies any nausea, vomiting, heartburn, diarrhea, constipation, melena, rectal bleeding  Patient quit drinking many years ago, quit smoking many years ago  He is retired   Family history is notable for prostate cancer  There is also history of breast cancer  ROS:  The remainder of the ROS was negative except for the pertinent positives mentioned in HPI           Allergies: Seasonal ic  [cholestatin]    Medications:   Current Outpatient Medications:     aspirin 1 mg/mL SUSP, Take 162 mg by mouth , Disp: , Rfl:     atorvastatin (LIPITOR) 40 mg tablet, TAKE 1 TABLET BY MOUTH  EVERY DAY, Disp: 90 tablet, Rfl: 3    fluticasone (FLONASE) 50 mcg/act nasal spray, 1 spray into each nostril daily, Disp: 1 Bottle, Rfl: 1    metoprolol succinate (TOPROL-XL) 25 mg 24 hr tablet, Take 1 tablet (25 mg total) by mouth 2 (two) times a day, Disp: 180 tablet, Rfl: 2    nitroglycerin (NITROSTAT) 0 4 mg SL tablet, Place 1 tablet under the tongue, Disp: , Rfl:   No current facility-administered medications for this visit  '    Past Medical History:   Diagnosis Date    Arthritis     Coronary artery disease involving native coronary artery of native heart without angina pectoris     Edema, lower extremity     Last assessed 1/12/2015     Hyperlipidemia     Hypertension     Melanoma ([facility] [address] )     back, removed    Stented coronary artery        Past Surgical History:   Procedure Laterality Date    BILATERAL VATS ABLATION      CAROTID STENT      Transcath placement of intrathoracic carotid artery stent     MALIGNANT SKIN LESION EXCISION      Trunk     NERVE BLOCK Bilateral 12/28/2018    Procedure: L3 L4 L5 S1 Medical Branch Block #2 ([id number]); Surgeon: Charan Childs MD;  Location: [facility] MAIN OR;  Service: Pain Management     NERVE BLOCK Bilateral 12/21/2018    Procedure: L3 L4 L5 S1 Medial Branch Block #1 ([id number]); Surgeon: Charan Childs MD;  Location: [facility] MAIN OR;  Service: Pain Management     RADIOFREQUENCY ABLATION Right 1/4/2019    Procedure: L3 L4 L5 S1 Radio Frequency Ablation;  Surgeon: Charan Childs MD;  Location: [facility] MAIN OR;  Service: Pain Management     RADIOFREQUENCY ABLATION Left 1/18/2019    Procedure: L3 L4 L5 S1 Radio Frequency Ablation;  Surgeon: Charan Childs MD;  Location: [facility] MAIN OR;  Service: Pain Management     SENTINEL LYMPH NODE BIOPSY         Family History   Problem Relation Age of Onset   [place] ALS Mother     Prostate cancer Father     Pancreatic cancer Father     Breast cancer Sister     Arrhythmia Neg Hx     Clotting disorder Neg Hx     Fainting Neg Hx     Heart attack Neg Hx     Heart disease Neg Hx     Hypertension Neg Hx     Hyperlipidemia Neg Hx     Anuerysm Neg Hx     Stroke Neg Hx         reports that he has quit smoking  He has quit using smokeless tobacco  He reports that he does not drink alcohol or use drugs            Physical Exam:     /70   Temp 97 6 °F (36 4 °C) (Temporal)   Ht 5' 10" (1 778 m)   Wt 128 kg (282 lb)   BMI 40 46 kg/m²     Gen: wn/wd, NAD, overweight elderly male  HEENT: anicteric, MMM, no cervical LAD  CVS: RRR, no m/r/g  CHEST: CTA b/l  ABD: +BS, soft, NT,ND, no hepatosplenomegaly, ventral hernia  EXT: no c/c/e  NEURO: aaox3  SKIN: NO rashes

## 2020-08-06 NOTE — TELEPHONE ENCOUNTER
I spoke to Peter, patient's   She called to see if CT was read as he has appointment today  I confirmed CT was read and will be available for Dr Tomasa Novak to review at appointment

## 2020-08-06 NOTE — TELEPHONE ENCOUNTER
Patients GI provider:  Dr Anahi Sharp    Number to return call: [phone]    Reason for call: Pt's  Colt Nicolas calling requesting to speak with someone in reference to a CAT SCAN that was done yesterday       Scheduled procedure/appointment date if applicable: Apt/procedure 8/6/20

## 2020-08-07 NOTE — PROGRESS NOTES
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  -remainder of his LFTs are normal  -no recent medications, no history of recent alcohol use   -recommended the patient check laboratory studies in a nonfasting state, will check hepatic function panel to evaluate for direct and indirect bilirubin  -if his LFTs remain elevated will check a right upper quadrant ultrasound and make further recommendations after    2  Colon cancer screening  -patient is up-to-date, had recent stool DNA test  -we can see the patient back in 1 year to discuss repeat evaluation at that time            _____________________________________________________________        CC:  Evaluation for elevated bilirubin    HPI:  Elena Sebastian is a 68 y o male who was referred for evaluation of elevated bilirubin  As you know this is a pleasant 73-year-old gentleman with history of coronary disease, currently treated for hypertension, hyperlipidemia  Patient has a history of cardiac stents  He has a history of malignant melanoma which was resected in 2009  Patient reports that he had a stool DNA test in the recent past which was normal     Recent laboratory studies demonstrated mild elevation of bilirubin for which she was referred here patient had a CT scan to follow-up on this which was normal     Patient denies any prior history of liver disease, jaundice, abdominal pain  He denies any nausea, vomiting, heartburn, diarrhea, constipation, melena, rectal bleeding  Patient quit drinking many years ago, quit smoking many years ago  He is retired   Family history is notable for prostate cancer  There is also history of breast cancer            ROS:  The remainder of the ROS was negative except for the pertinent positives mentioned in HPI  Allergies: Seasonal ic  [cholestatin]    Medications:   Current Outpatient Medications:     aspirin 1 mg/mL SUSP, Take 162 mg by mouth , Disp: , Rfl:     atorvastatin (LIPITOR) 40 mg tablet, TAKE 1 TABLET BY MOUTH  EVERY DAY, Disp: 90 tablet, Rfl: 3    fluticasone (FLONASE) 50 mcg/act nasal spray, 1 spray into each nostril daily, Disp: 1 Bottle, Rfl: 1    metoprolol succinate (TOPROL-XL) 25 mg 24 hr tablet, Take 1 tablet (25 mg total) by mouth 2 (two) times a day, Disp: 180 tablet, Rfl: 2    nitroglycerin (NITROSTAT) 0 4 mg SL tablet, Place 1 tablet under the tongue, Disp: , Rfl:   No current facility-administered medications for this visit  '    Past Medical History:   Diagnosis Date    Arthritis     Coronary artery disease involving native coronary artery of native heart without angina pectoris     Edema, lower extremity     Last assessed 1/12/2015     Hyperlipidemia     Hypertension     Melanoma ([address] )     back, removed    Stented coronary artery        Past Surgical History:   Procedure Laterality Date    BILATERAL VATS ABLATION      CAROTID STENT      Transcath placement of intrathoracic carotid artery stent     MALIGNANT SKIN LESION EXCISION      Trunk     NERVE BLOCK Bilateral 12/28/2018    Procedure: L3 L4 L5 S1 Medical Branch Block #2 ([id number]); Surgeon: Leslie Oh MD;  Location: [facility] MAIN OR;  Service: Pain Management     NERVE BLOCK Bilateral 12/21/2018    Procedure: L3 L4 L5 S1 Medial Branch Block #1 ([id number]);   Surgeon: Leslie Oh MD;  Location: [facility] MAIN OR;  Service: Pain Management     RADIOFREQUENCY ABLATION Right 1/4/2019    Procedure: L3 L4 L5 S1 Radio Frequency Ablation;  Surgeon: Leslie Oh MD;  Location: [facility] MAIN OR;  Service: Pain Management     RADIOFREQUENCY ABLATION Left 1/18/2019    Procedure: L3 L4 L5 S1 Radio Frequency Ablation; Surgeon: Janay Gooden MD;  Location: [facility] MAIN OR;  Service: Pain Management     SENTINEL LYMPH NODE BIOPSY         Family History   Problem Relation Age of Onset   [facility] ALS Mother     Prostate cancer Father     Pancreatic cancer Father     Breast cancer Sister     Arrhythmia Neg Hx     Clotting disorder Neg Hx     Fainting Neg Hx     Heart attack Neg Hx     Heart disease Neg Hx     Hypertension Neg Hx     Hyperlipidemia Neg Hx     Anuerysm Neg Hx     Stroke Neg Hx         reports that he has quit smoking  He has quit using smokeless tobacco  He reports that he does not drink alcohol or use drugs            Physical Exam:     /70   Temp 97 6 °F (36 4 °C) (Temporal)   Ht 5' 10" (1 778 m)   Wt 128 kg (282 lb)   BMI 40 46 kg/m²     Gen: wn/wd, NAD, overweight elderly male  HEENT: anicteric, MMM, no cervical LAD  CVS: RRR, no m/r/g  CHEST: CTA b/l  ABD: +BS, soft, NT,ND, no hepatosplenomegaly, ventral hernia  EXT: no c/c/e  NEURO: aaox3  SKIN: NO rashes

## 2020-08-15 DIAGNOSIS — R17 ELEVATED BILIRUBIN: Primary | ICD-10-CM

## 2020-08-22 ENCOUNTER — HOSPITAL ENCOUNTER (OUTPATIENT)
Dept: ULTRASOUND IMAGING | Facility: HOSPITAL | Age: 70
Discharge: HOME | End: 2020-08-22
Attending: INTERNAL MEDICINE
Payer: MEDICARE

## 2020-08-22 DIAGNOSIS — R17 ELEVATED BILIRUBIN: ICD-10-CM

## 2020-08-22 PROCEDURE — 76705 ECHO EXAM OF ABDOMEN: CPT

## 2020-08-26 ENCOUNTER — TELEPHONE (OUTPATIENT)
Dept: GASTROENTEROLOGY | Facility: CLINIC | Age: 70
End: 2020-08-26

## 2020-08-26 NOTE — LETTER
August 26, 2020     Gabe Grant  [address] [place] [address]      Dear Mr Franklin:          Our office has attempted to call you in regards to your results, however, we have been unable to get a hold of you  Please give our office a call so we can review your results and answer any questions you may have in regards to your recent Lab Work                       Sincerely,          [facility] HPI Comments:   6:38 AM   19 y.o. male presents to ED C/O SOB, wheezing. Patient has a HX of ashtma, and Guillain Mauldin syndrome. Patient reports he woke up this morning not able to breath, his chest felt tight and he was wheezing. Patient denies recent illness, cough, fever, sore throat, N/V/D. Patient reports he has been out of his inhaler for three days. Patient denies previous intubation for asthma. Patient brought in by EMS, 90% on room air on arrival, up to 100% after 1 duo and 1 neb, however with still significant work of breathing. Patient denies chest pain at this time, reports chest felt tight earlier. Patient smokes 1 black and mild cigar a day. Pt denies any other sxs or complaints. Written by Joe WINTERS      The history is provided by the patient. History limited by: No language barrier. Past Medical History:   Diagnosis Date    Asthma     Guillain Barré syndrome ([facility] [address]        History reviewed. No pertinent past surgical history. History reviewed. No pertinent family history. Social History     Social History    Marital status: SINGLE     Spouse name: N/A    Number of children: N/A    Years of education: N/A     Occupational History    Not on file. Social History Main Topics    Smoking status: Current Every Day Smoker     Packs/day: 0.25    Smokeless tobacco: Not on file      Comment: Black and mild    Alcohol use No    Drug use: No    Sexual activity: Not on file     Other Topics Concern    Not on file     Social History Narrative         ALLERGIES: Review of patient's allergies indicates no known allergies. Review of Systems   Constitutional: Negative for activity change, appetite change, chills, fatigue and fever. HENT: Negative for congestion, dental problem, ear pain, rhinorrhea, sinus pressure, sneezing, sore throat, trouble swallowing and voice change. Eyes: Negative for pain, discharge, redness and itching.    Respiratory: Positive for shortness of breath and wheezing. Negative for cough and chest tightness. Cardiovascular: Negative for chest pain and palpitations. Gastrointestinal: Negative for abdominal distention, abdominal pain, blood in stool, constipation, diarrhea, nausea, rectal pain and vomiting. Endocrine: Negative for polyuria. Genitourinary: Negative for discharge, dysuria, flank pain, hematuria, penile pain and testicular pain. Musculoskeletal: Negative for arthralgias, back pain, joint swelling, neck pain and neck stiffness. Skin: Negative for color change, rash and wound. Allergic/Immunologic: Negative for immunocompromised state. Neurological: Negative for dizziness, weakness, light-headedness, numbness and headaches. Hematological: Negative for adenopathy. Psychiatric/Behavioral: Negative for agitation and behavioral problems. The patient is not nervous/anxious. Vitals:    01/29/17 0638 01/29/17 0714 01/29/17 0730   BP: 113/58 112/60 (!) 134/94   Pulse: 61 67 81   Resp: 12 14 17   Temp: 97.7 °F (36.5 °C)     SpO2: 100% 100% 95%   Weight: 63.5 kg (140 lb)     Height: 5' 11\" (1.803 m)              Physical Exam   Constitutional: He is oriented to person, place, and time. He appears well-developed and well-nourished. Arrived complete breathing treatment, NOT tripoding, able to speak in 3-4 word sentences    HENT:   Head: Normocephalic and atraumatic. Right Ear: External ear normal.   Left Ear: External ear normal.   Nose: Nose normal.   Eyes: Conjunctivae and EOM are normal.   Cardiovascular: Normal rate, regular rhythm and normal heart sounds. Pulmonary/Chest: No accessory muscle usage. Tachypnea noted. He has decreased breath sounds in the right lower field and the left lower field. He has wheezes. RR is 28 breathes per minute    Musculoskeletal: Normal range of motion. Neurological: He is alert and oriented to person, place, and time. Skin: Skin is warm and dry. No rash noted.  He is not diaphoretic. No erythema. No pallor. Psychiatric: He has a normal mood and affect. His behavior is normal. Judgment and thought content normal.   Nursing note and vitals reviewed. MDM  Number of Diagnoses or Management Options  Asthma with acute exacerbation, unspecified asthma severity:   Elevated blood pressure:   Diagnosis management comments: DDX:  asthma exacerbation, viral URI, bronchitis, pneumonia, staticus asthmaticus, PE    MDM :  Plan - Chest xray, breathing treatment, IV solumedrol and mag. Low clinical concern for PE due to wheezing and known ashtma HX. PERC - negative. 7:17 AM Patient's breath sounds significantly improved, lower lobes not longer diminished, fine expiratory wheezing still noted in the bases. No wheezing noted in upper lung fields. 8:36 AM Faint intermittent wheezing right lower lobe, all other fields clear. Patient reports he is feeling much better. Will discharge after pulse ox checked after ambulation. No acute cardiopulmonary abnormality noted on chest xray  -Pulse ox only dropped to 97% with ambulation, no distress noted. - Patient to be discharged home with diagnosis of asthma exacerbation. Breath sounds significantly improved, patient tolerating ambulation and reports feeling much better, patient is appropriate for discharge home. Will refill inhaler of albuterol and discharge with prednisone. Patient's last BP was slightly elevated, patient referred to PCP for further evaluation. Patient informed of results and discharge plan, patient denies questions. Patient educated to return to the ED for any new or worsening symptoms.          Amount and/or Complexity of Data Reviewed  Tests in the radiology section of CPT®: ordered and reviewed  Independent visualization of images, tracings, or specimens: yes      ED Course       Procedures             RESULTS:    XR CHEST PORT    (Results Pending)       Labs Reviewed - No data to display    No results found for this or any previous visit (from the past 12 hour(s)). PROGRESS NOTE:   6:39 AM   Initial assessment completed. Written by Noam WINTERS     One or more blood pressure readings were noted elevated during the Pt's presentation in the emergency department this date. This abnormal reading has been cited in the Pt's diagnosis, and they have been encouraged to follow up with their primary care physician, or referred to a consultant for further evaluation and treatment. Renetta Ding NP 8:41 AM     DISCHARGE NOTE:  8:39 AM   Phillip Leach  results have been reviewed with him. He has been counseled regarding his diagnosis, treatment, and plan. He verbally conveys understanding and agreement of the signs, symptoms, diagnosis, treatment and prognosis and additionally agrees to follow up as discussed. He also agrees with the care-plan and conveys that all of his questions have been answered. I have also provided discharge instructions for him that include: educational information regarding their diagnosis and treatment, and list of reasons why they would want to return to the ED prior to their follow-up appointment, should his condition change. CLINICAL IMPRESSION:    1. Asthma with acute exacerbation, unspecified asthma severity    2. Elevated blood pressure        AFTER VISIT PLAN:    Current Discharge Medication List      START taking these medications    Details   predniSONE (DELTASONE) 50 mg tablet Take 1 Tab by mouth daily for 5 days. Qty: 5 Tab, Refills: 0         CONTINUE these medications which have CHANGED    Details   albuterol sulfate (PROAIR RESPICLICK) 90 mcg/actuation aepb Take 2 Puffs by inhalation every four (4) hours as needed. Qty: 1 Inhaler, Refills: 0         CONTINUE these medications which have NOT CHANGED    Details   Cetirizine (ZYRTEC) 10 mg cap Take  by mouth.  Indications: SEASONAL ALLERGIC RHINITIS              Follow-up Information     Follow up With Details Comments [address]ame[address] MD Schedule an appointment as soon as possible for a visit in 3 days Further evaluation [address]  [place] [address] [phone]             Written by Calvin WINTERS

## 2020-08-26 NOTE — TELEPHONE ENCOUNTER
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

## 2020-08-27 ENCOUNTER — TELEPHONE (OUTPATIENT)
Dept: GASTROENTEROLOGY | Facility: AMBULARY SURGERY CENTER | Age: 70
End: 2020-08-27

## 2020-08-27 NOTE — TELEPHONE ENCOUNTER
Patient's POA is requesting results on his ultrasound  Please review      Thank you,  Marilynn Wakefield

## 2020-08-28 ENCOUNTER — TELEPHONE (OUTPATIENT)
Dept: GASTROENTEROLOGY | Facility: CLINIC | Age: 70
End: 2020-08-28

## 2020-08-28 DIAGNOSIS — R17 ELEVATED BILIRUBIN: Primary | ICD-10-CM

## 2020-08-28 NOTE — TELEPHONE ENCOUNTER
----- Message from Yuliana Pak MD sent at 8/28/2020 10:57 AM EDT -----  Please inform patient that recent ultrasound was normal     He should have repeat LFTs in 3 months time, I placed an order in  Please have him call with any questions or concerns

## 2021-02-13 DIAGNOSIS — Z23 ENCOUNTER FOR IMMUNIZATION: ICD-10-CM

## 2021-02-26 ENCOUNTER — IMMUNIZATIONS (OUTPATIENT)
Dept: FAMILY MEDICINE CLINIC | Facility: HOSPITAL | Age: 71
End: 2021-02-26

## 2021-02-26 DIAGNOSIS — Z23 ENCOUNTER FOR IMMUNIZATION: Primary | ICD-10-CM

## 2021-02-26 PROCEDURE — 0001A SARS-COV-2 / COVID-19 MRNA VACCINE (PFIZER-BIONTECH) 30 MCG: CPT

## 2021-02-26 PROCEDURE — 91300 SARS-COV-2 / COVID-19 MRNA VACCINE (PFIZER-BIONTECH) 30 MCG: CPT

## 2021-03-09 ENCOUNTER — TELEPHONE (OUTPATIENT)
Dept: FAMILY MEDICINE CLINIC | Facility: CLINIC | Age: 71
End: 2021-03-09

## 2021-03-17 DIAGNOSIS — I25.10 CORONARY ARTERY DISEASE INVOLVING NATIVE CORONARY ARTERY OF NATIVE HEART WITHOUT ANGINA PECTORIS: ICD-10-CM

## 2021-03-18 ENCOUNTER — IMMUNIZATIONS (OUTPATIENT)
Dept: FAMILY MEDICINE CLINIC | Facility: HOSPITAL | Age: 71
End: 2021-03-18

## 2021-03-18 DIAGNOSIS — Z23 ENCOUNTER FOR IMMUNIZATION: Primary | ICD-10-CM

## 2021-03-18 PROCEDURE — 91300 SARS-COV-2 / COVID-19 MRNA VACCINE (PFIZER-BIONTECH) 30 MCG: CPT

## 2021-03-18 PROCEDURE — 0002A SARS-COV-2 / COVID-19 MRNA VACCINE (PFIZER-BIONTECH) 30 MCG: CPT

## 2021-03-18 RX ORDER — METOPROLOL SUCCINATE 25 MG/1
TABLET, EXTENDED RELEASE ORAL
Qty: 180 TABLET | Refills: 3 | Status: SHIPPED | OUTPATIENT
Start: 2021-03-18 | End: 2021-12-20

## 2021-03-25 NOTE — TELEPHONE ENCOUNTER
Lavonne called to let you know that Charles's CT is scheduled 8/5 @ 5:00 pm
Medicare does not need auth unless they have a secondary    Per Catrachita Valerio this does not need Alin
Patient will need a prior authorization 
no

## 2021-05-28 DIAGNOSIS — I25.10 CAD (CORONARY ARTERY DISEASE): ICD-10-CM

## 2021-06-01 RX ORDER — ATORVASTATIN CALCIUM 40 MG/1
TABLET, FILM COATED ORAL
Qty: 90 TABLET | Refills: 3 | Status: SHIPPED | OUTPATIENT
Start: 2021-06-01 | End: 2022-02-22

## 2021-12-18 DIAGNOSIS — I25.10 CORONARY ARTERY DISEASE INVOLVING NATIVE CORONARY ARTERY OF NATIVE HEART WITHOUT ANGINA PECTORIS: ICD-10-CM

## 2021-12-20 RX ORDER — METOPROLOL SUCCINATE 25 MG/1
TABLET, EXTENDED RELEASE ORAL
Qty: 180 TABLET | Refills: 3 | Status: SHIPPED | OUTPATIENT
Start: 2021-12-20

## 2022-02-21 DIAGNOSIS — I25.10 CAD (CORONARY ARTERY DISEASE): ICD-10-CM

## 2022-02-22 RX ORDER — ATORVASTATIN CALCIUM 40 MG/1
TABLET, FILM COATED ORAL
Qty: 90 TABLET | Refills: 3 | Status: SHIPPED | OUTPATIENT
Start: 2022-02-22

## 2022-07-09 NOTE — NURSING NOTE
Approx  [address], Charge RN, noticed patient had bigeminy and trigeminy on monitor  Jen Howell was on unit, advised  Took VS, EKG, and labs drawn  Pt denies chest pain, asymptomatic  No new orders, pt stable 
No

## 2022-10-24 ENCOUNTER — OFFICE VISIT (OUTPATIENT)
Dept: CARDIOLOGY CLINIC | Facility: CLINIC | Age: 72
End: 2022-10-24
Payer: MEDICARE

## 2022-10-24 VITALS
HEART RATE: 72 BPM | BODY MASS INDEX: 37.68 KG/M2 | SYSTOLIC BLOOD PRESSURE: 144 MMHG | WEIGHT: 263.2 LBS | DIASTOLIC BLOOD PRESSURE: 76 MMHG | HEIGHT: 70 IN

## 2022-10-24 DIAGNOSIS — I25.10 CAD (CORONARY ARTERY DISEASE): ICD-10-CM

## 2022-10-24 DIAGNOSIS — I49.3 PVC (PREMATURE VENTRICULAR CONTRACTION): ICD-10-CM

## 2022-10-24 DIAGNOSIS — I25.10 CORONARY ARTERY DISEASE INVOLVING NATIVE CORONARY ARTERY OF NATIVE HEART WITHOUT ANGINA PECTORIS: Primary | ICD-10-CM

## 2022-10-24 PROCEDURE — 93000 ELECTROCARDIOGRAM COMPLETE: CPT | Performed by: INTERNAL MEDICINE

## 2022-10-24 PROCEDURE — 99214 OFFICE O/P EST MOD 30 MIN: CPT | Performed by: INTERNAL MEDICINE

## 2022-10-24 RX ORDER — ATORVASTATIN CALCIUM 40 MG/1
40 TABLET, FILM COATED ORAL DAILY
Qty: 90 TABLET | Refills: 3 | Status: SHIPPED | OUTPATIENT
Start: 2022-10-24

## 2022-10-24 RX ORDER — METOPROLOL SUCCINATE 50 MG/1
50 TABLET, EXTENDED RELEASE ORAL 2 TIMES DAILY
Qty: 180 TABLET | Refills: 3 | Status: SHIPPED | OUTPATIENT
Start: 2022-10-24

## 2022-10-24 NOTE — PROGRESS NOTES
Cardiology Follow Up    Mayme Comment  1950  [id number]  [facility] [address] [address] [phone]    1  Coronary artery disease involving native coronary artery of native heart without angina pectoris  POCT ECG       Interval History: Followup cad    No chest pain or palps  Some dyspnea with moderate exertion  Medical Problems             Problem List     Chronic pain syndrome    Chronic bilateral low back pain without sciatica    Chronic back pain    Low back pain    Overview Signed 12/17/2018  3:54 PM by Dano Suárez     Added automatically from request for surgery [id number]         Weakness    Coronary artery disease    Benign essential hypertension    Leukocytosis    PVC's (premature ventricular contractions)    Irregular heart rhythm    Obesity (BMI 30-39  9)    Medicare annual wellness visit, initial    Elevated bilirubin    Adrenal nodule ([facility] )    Encounter for prostate cancer screening    Screening for colon cancer    Post-nasal drip              Past Medical History:   Diagnosis Date   • Arthritis    • Coronary artery disease involving native coronary artery of native heart without angina pectoris    • Edema, lower extremity     Last assessed 1/12/2015    • Hyperlipidemia    • Hypertension    • Melanoma ([facility] )     back, removed   • Stented coronary artery      Social History     Socioeconomic History   • Marital status: Single     Spouse name: Not on file   • Number of children: Not on file   • Years of education: Not on file   • Highest education level: Not on file   Occupational History   • Not on file   Tobacco Use   • Smoking status: Former Smoker   • Smokeless tobacco: Former User   Substance and Sexual Activity   • Alcohol use: No   • Drug use: No   • Sexual activity: Not on file   Other Topics Concern   • Not on file   Social History Narrative    History of never a smoker, [facility]12[facility]2015 [facility]      Social Determinants of Health     Financial Resource Strain: Not on file   Food Insecurity: Not on file   Transportation Needs: Not on file   Physical Activity: Not on file   Stress: Not on file   Social Connections: Not on file   Intimate Partner Violence: Not on file   Housing Stability: Not on file      Family History   Problem Relation Age of Onset   • ALS Mother    • Prostate cancer Father    • Pancreatic cancer Father    • Breast cancer Sister    • Arrhythmia Neg Hx    • Clotting disorder Neg Hx    • Fainting Neg Hx    • Heart attack Neg Hx    • Heart disease Neg Hx    • Hypertension Neg Hx    • Hyperlipidemia Neg Hx    • Anuerysm Neg Hx    • Stroke Neg Hx      Past Surgical History:   Procedure Laterality Date   • BILATERAL VATS ABLATION     • CAROTID STENT      Transcath placement of intrathoracic carotid artery stent    • MALIGNANT SKIN LESION EXCISION      Trunk    • NERVE BLOCK Bilateral 12/28/2018    Procedure: L3 L4 L5 S1 Medical Branch Block #2 ([id number]); Surgeon: Kendy Aguiar MD;  Location: [facility] MAIN OR;  Service: Pain Management    • NERVE BLOCK Bilateral 12/21/2018    Procedure: L3 L4 L5 S1 Medial Branch Block #1 ([id number]);   Surgeon: Kendy Aguiar MD;  Location: [facility] MAIN OR;  Service: Pain Management    • RADIOFREQUENCY ABLATION Right 1/4/2019    Procedure: L3 L4 L5 S1 Radio Frequency Ablation;  Surgeon: Kendy Aguiar MD;  Location: [facility] MAIN OR;  Service: Pain Management    • RADIOFREQUENCY ABLATION Left 1/18/2019    Procedure: L3 L4 L5 S1 Radio Frequency Ablation;  Surgeon: Kendy Aguiar MD;  Location: [facility] MAIN OR;  Service: Pain Management    • SENTINEL LYMPH NODE BIOPSY         Current Outpatient Medications:   •  aspirin 1 mg/mL SUSP, Take 162 mg by mouth , Disp: , Rfl:   •  atorvastatin (LIPITOR) 40 mg tablet, TAKE 1 TABLET BY MOUTH  DAILY, Disp: 90 tablet, Rfl: 3  •  fluticasone (FLONASE) 50 mcg/act nasal spray, 1 spray into each nostril daily, Disp: 1 Bottle, Rfl: 1  •  metoprolol succinate (TOPROL-XL) 25 mg 24 hr tablet, TAKE 1 TABLET BY MOUTH  TWICE DAILY, Disp: 180 tablet, Rfl: 3  •  nitroglycerin (NITROSTAT) 0 4 mg SL tablet, Place 1 tablet under the tongue, Disp: , Rfl:   Allergies   Allergen Reactions   • Seasonal Ic  [Cholestatin]        Labs:     Chemistry        Component Value Date/Time     03/07/2015 1123    K 4 8 07/16/2020 1055    K 4 6 03/07/2015 1123     07/16/2020 1055     03/07/2015 1123    CO2 32 07/16/2020 1055    CO2 31 03/07/2015 1123    BUN 20 07/16/2020 1055    BUN 22 03/07/2015 1123    CREATININE 1 08 07/16/2020 1055    CREATININE 0 92 03/07/2015 1123        Component Value Date/Time    CALCIUM 9 5 07/16/2020 1055    CALCIUM 9 5 03/07/2015 1123    ALKPHOS 80 08/06/2020 1404    ALKPHOS 81 07/13/2015 1108    AST 19 08/06/2020 1404    AST 34 07/13/2015 1108    ALT 26 08/06/2020 1404    ALT 14 07/13/2015 1108    BILITOT 1 3 (H) 07/13/2015 1108            Lab Results   Component Value Date    CHOL 107 07/13/2015    CHOL 105 02/05/2015     Lab Results   Component Value Date    HDL 41 02/26/2019    HDL 42 02/16/2018    HDL 44 02/27/2017     Lab Results   Component Value Date    LDLCALC 57 02/26/2019    LDLCALC 48 02/16/2018    LDLCALC 36 02/27/2017     Lab Results   Component Value Date    TRIG 85 02/26/2019    TRIG 105 02/16/2018    TRIG 137 02/27/2017     No results found for: CHOLHDL    Imaging: No results found  EKG: NSR with frequent PVCs  Review of Systems   Constitutional: Negative  HENT: Negative  Eyes: Negative  Cardiovascular: Negative  Respiratory: Negative  Endocrine: Negative  Hematologic/Lymphatic: Negative  Skin: Negative  Musculoskeletal: Negative  Gastrointestinal: Negative  Genitourinary: Negative  Neurological: Negative  Psychiatric/Behavioral: Negative  Allergic/Immunologic: Negative          Vitals:    10/24/22 1108   BP: 144/76 Pulse: 72           Physical Exam  Vitals reviewed  Constitutional:       Appearance: Normal appearance  HENT:      Head: Normocephalic  Nose: Nose normal       Mouth/Throat:      Mouth: Mucous membranes are moist       Pharynx: Oropharynx is clear  Eyes:      General: No scleral icterus  Conjunctiva/sclera: Conjunctivae normal    Cardiovascular:      Rate and Rhythm: Normal rate and regular rhythm  Heart sounds: No murmur heard  No friction rub  No gallop  Pulmonary:      Effort: Pulmonary effort is normal  No respiratory distress  Breath sounds: Normal breath sounds  No wheezing or rales  Abdominal:      General: Abdomen is flat  Bowel sounds are normal  There is no distension  Palpations: Abdomen is soft  Tenderness: There is no abdominal tenderness  There is no guarding  Musculoskeletal:      Cervical back: Normal range of motion and neck supple  Right lower leg: No edema  Left lower leg: No edema  Skin:     General: Skin is warm and dry  Neurological:      General: No focal deficit present  Mental Status: He is alert and oriented to person, place, and time  Psychiatric:         Mood and Affect: Mood normal          Behavior: Behavior normal          Discussion/Summary:    1  Coronary Artery Disease: Hx of YOGESH to OM and RPDA  Overall stable  Continue medical therapy  LDL was 57  He has no angina       2  HTN: BP is stable on medical therapy       3  PVCs: Frequent pvcs on his ekg today in triplets  Increase metoprolol to 100 mg twice a day  Check echo and holter              The patient was counseled regarding diagnostic results, instructions for management, risk factor reductions, impressions  total time of encounter was 25 minutes and 15 minutes was spent counseling

## 2022-12-01 ENCOUNTER — HOSPITAL ENCOUNTER (OUTPATIENT)
Dept: NON INVASIVE DIAGNOSTICS | Facility: CLINIC | Age: 72
Discharge: HOME | End: 2022-12-01

## 2022-12-01 VITALS
DIASTOLIC BLOOD PRESSURE: 76 MMHG | SYSTOLIC BLOOD PRESSURE: 144 MMHG | BODY MASS INDEX: 37.65 KG/M2 | WEIGHT: 263 LBS | HEIGHT: 70 IN | HEART RATE: 73 BPM

## 2022-12-01 DIAGNOSIS — I49.3 PVC (PREMATURE VENTRICULAR CONTRACTION): ICD-10-CM

## 2022-12-01 DIAGNOSIS — I25.10 CORONARY ARTERY DISEASE INVOLVING NATIVE CORONARY ARTERY OF NATIVE HEART WITHOUT ANGINA PECTORIS: ICD-10-CM

## 2022-12-01 LAB
AORTIC ROOT: 3.5 CM
AORTIC VALVE MEAN VELOCITY: 12.2 M/S
APICAL FOUR CHAMBER EJECTION FRACTION: 53 %
ASCENDING AORTA: 3.3 CM
AV AREA BY CONTINUOUS VTI: 2 CM2
AV AREA PEAK VELOCITY: 1.9 CM2
AV LVOT MEAN GRADIENT: 1 MMHG
AV LVOT PEAK GRADIENT: 2 MMHG
AV MEAN GRADIENT: 7 MMHG
AV PEAK GRADIENT: 14 MMHG
AV VALVE AREA: 2.03 CM2
AV VELOCITY RATIO: 0.41
DOP CALC AO PEAK VEL: 1.83 M/S
DOP CALC AO VTI: 38.42 CM
DOP CALC LVOT AREA: 4.52 CM2
DOP CALC LVOT DIAMETER: 2.4 CM
DOP CALC LVOT PEAK VEL VTI: 17.25 CM
DOP CALC LVOT PEAK VEL: 0.75 M/S
DOP CALC LVOT STROKE INDEX: 31.9 ML/M2
DOP CALC LVOT STROKE VOLUME: 78
FRACTIONAL SHORTENING: 28 (ref 28–44)
INTERVENTRICULAR SEPTUM IN DIASTOLE (PARASTERNAL SHORT AXIS VIEW): 1.1 CM
INTERVENTRICULAR SEPTUM: 1.1 CM (ref 0.6–1.1)
LAAS-AP2: 36.7 CM2
LAAS-AP4: 35.9 CM2
LEFT ATRIUM SIZE: 5.6 CM
LEFT INTERNAL DIMENSION IN SYSTOLE: 4.4 CM (ref 2.1–4)
LEFT VENTRICULAR INTERNAL DIMENSION IN DIASTOLE: 6.1 CM (ref 3.5–6)
LEFT VENTRICULAR POSTERIOR WALL IN END DIASTOLE: 1.2 CM
LEFT VENTRICULAR STROKE VOLUME: 100 ML
LVSV (TEICH): 100 ML
RIGHT ATRIUM AREA SYSTOLE A4C: 18.1 CM2
RIGHT VENTRICLE ID DIMENSION: 4.9 CM
SL CV LEFT ATRIUM LENGTH A2C: 7.9 CM
SL CV LV EF: 55
SL CV PED ECHO LEFT VENTRICLE DIASTOLIC VOLUME (MOD BIPLANE) 2D: 185 ML
SL CV PED ECHO LEFT VENTRICLE SYSTOLIC VOLUME (MOD BIPLANE) 2D: 85 ML

## 2022-12-02 ENCOUNTER — TELEPHONE (OUTPATIENT)
Dept: CARDIOLOGY CLINIC | Facility: CLINIC | Age: 72
End: 2022-12-02

## 2022-12-02 NOTE — TELEPHONE ENCOUNTER
Pt is aware of his results    ----- Message from Taisha Marin MD sent at 12/2/2022  9:07 AM EST -----  Normal ef   Looks good   ----- Message -----  From: Sima Palmer MD  Sent: 12/1/2022   9:30 AM EST  To: Taisha Marin MD

## 2023-01-30 ENCOUNTER — RA CDI HCC (OUTPATIENT)
Dept: OTHER | Facility: HOSPITAL | Age: 73
End: 2023-01-30

## 2023-01-30 NOTE — PROGRESS NOTES
[address]  coding opportunities       Chart reviewed, no opportunity found: CHART REVIEWED, NO OPPORTUNITY FOUND        Patients Insurance     Medicare Insurance: Medicare

## 2023-02-06 ENCOUNTER — OFFICE VISIT (OUTPATIENT)
Dept: FAMILY MEDICINE CLINIC | Facility: CLINIC | Age: 73
End: 2023-02-06

## 2023-02-06 VITALS
HEART RATE: 72 BPM | RESPIRATION RATE: 16 BRPM | WEIGHT: 272 LBS | SYSTOLIC BLOOD PRESSURE: 132 MMHG | HEIGHT: 70 IN | BODY MASS INDEX: 38.94 KG/M2 | DIASTOLIC BLOOD PRESSURE: 78 MMHG | OXYGEN SATURATION: 97 %

## 2023-02-06 DIAGNOSIS — E66.01 MORBID OBESITY (HCC): ICD-10-CM

## 2023-02-06 DIAGNOSIS — I25.10 CORONARY ARTERY DISEASE INVOLVING NATIVE HEART WITHOUT ANGINA PECTORIS, UNSPECIFIED VESSEL OR LESION TYPE: ICD-10-CM

## 2023-02-06 DIAGNOSIS — E27.8 ADRENAL NODULE (HCC): ICD-10-CM

## 2023-02-06 DIAGNOSIS — Z00.00 MEDICARE ANNUAL WELLNESS VISIT, SUBSEQUENT: Primary | ICD-10-CM

## 2023-02-06 DIAGNOSIS — I10 BENIGN ESSENTIAL HYPERTENSION: ICD-10-CM

## 2023-02-06 DIAGNOSIS — M51.16 LUMBAR DISC HERNIATION WITH RADICULOPATHY: ICD-10-CM

## 2023-02-06 DIAGNOSIS — D72.829 LEUKOCYTOSIS, UNSPECIFIED TYPE: ICD-10-CM

## 2023-02-06 DIAGNOSIS — Z12.11 SCREENING FOR COLON CANCER: ICD-10-CM

## 2023-02-06 PROBLEM — R09.82 POST-NASAL DRIP: Status: RESOLVED | Noted: 2020-07-15 | Resolved: 2023-02-06

## 2023-02-06 PROBLEM — M54.50 LOW BACK PAIN: Status: RESOLVED | Noted: 2018-12-17 | Resolved: 2023-02-06

## 2023-02-06 NOTE — PROGRESS NOTES
Assessment and Plan:     Problem List Items Addressed This Visit        Cardiovascular and Mediastinum    Coronary artery disease    Relevant Orders    Comprehensive metabolic panel    CBC and differential    TSH, 3rd generation with Free T4 reflex    Lipid Panel with Direct LDL reflex    Patient follows up with cardiology  Benign essential hypertension    Relevant Orders    Comprehensive metabolic panel    CBC and differential    TSH, 3rd generation with Free T4 reflex    Lipid Panel with Direct LDL reflex    Patient follows up with cardiology  Nervous and Auditory    Lumbar disc herniation with radiculopathy  Patient instructed to continue to follow-up with pain management  Other    Leukocytosis    Relevant Orders    CBC and differential    Patient has seen hematology in the past    CBC ordered  Adrenal nodule (HCC)  2cm Right adrenal nodule noted on CT Scan in 2020  Based on CT scan no need for further follow-up  Screening for colon cancer    Relevant Orders    Cologuard Medicare annual wellness visit, subsequent - Primary  Patient instructed to eat a healthy low fat diet  Morbid obesity ([address] )    Relevant Orders    Comprehensive metabolic panel    CBC and differential    TSH, 3rd generation with Free T4 reflex    Lipid Panel with Direct LDL reflex    Patient instructed to eat a healthy low fat diet  Lab work ordered  Patient instructed to follow-up in 1 month or sooner prn  BMI Counseling: Body mass index is 39 03 kg/m²  The BMI is above normal  Nutrition recommendations include encouraging healthy choices of fruits and vegetables, decreasing fast food intake, consuming healthier snacks, reducing intake of saturated and trans fat and reducing intake of cholesterol  Rationale for BMI follow-up plan is due to patient being overweight or obese  Depression Screening and Follow-up Plan: Patient was screened for depression during today's encounter   They screened negative with a PHQ-2 score of 0  Preventive health issues were discussed with patient, and age appropriate screening tests were ordered as noted in patient's After Visit Summary  Personalized health advice and appropriate referrals for health education or preventive services given if needed, as noted in patient's After Visit Summary  History of Present Illness:     Patient presents for a Medicare Wellness Visit    Patient is here for a follow-up for chronic medical conditions  Patient follows up with cardiology for CAD and HTN  Denies any chest pain, SOB, or palpitations  Patient is here for a follow-up for obesity  Patient reports that he does not really watch his diet  Patient reports that he does not exercise on a regular basis  Patient has seen pain management in the past for lumbar disc herniation  Patient has a history of an adrenal nodule  Patient has seen hematology in the past for leukocytosis  Patient Care Team:  Ernst Shoulders as PCP - General (Family Medicine)  MD Birgit Gomez MD Nan Collie, MD     Review of Systems:     Review of Systems   Constitutional: Negative for chills and fever  HENT: Negative for congestion, ear pain, sinus pressure, sore throat and trouble swallowing  Eyes: Negative for pain, discharge and redness  Respiratory: Negative for cough, chest tightness, shortness of breath and wheezing  Cardiovascular: Negative for chest pain, palpitations and leg swelling  Gastrointestinal: Negative for abdominal pain, blood in stool, diarrhea, nausea and vomiting  Genitourinary: Negative for dysuria, frequency, hematuria and urgency  Musculoskeletal:        As noted in HPI  Neurological: Negative for dizziness, seizures, syncope, light-headedness and headaches  Psychiatric/Behavioral: Negative for suicidal ideas  Denies any depression           Problem List:     Patient Active Problem List Diagnosis   • Chronic pain syndrome   • Chronic bilateral low back pain without sciatica   • Chronic back pain   • Weakness   • Coronary artery disease   • Benign essential hypertension   • Leukocytosis   • PVC's (premature ventricular contractions)   • Irregular heart rhythm   • Obesity (BMI 30-39  9)   • Medicare annual wellness visit, initial   • Elevated bilirubin   • Adrenal nodule ([facility] )   • Encounter for prostate cancer screening   • Screening for colon cancer   • Lumbar disc herniation with radiculopathy   • Degenerative lumbar spinal stenosis   • Adolescent idiopathic scoliosis of lumbar region   • Medicare annual wellness visit, subsequent   • Morbid obesity ([facility] )      Past Medical and Surgical History:     Past Medical History:   Diagnosis Date   • Arthritis    • Coronary artery disease involving native coronary artery of native heart without angina pectoris    • Edema, lower extremity     Last assessed 1/12/2015    • Hyperlipidemia    • Hypertension    • Melanoma ([facility] )     back, removed   • Stented coronary artery      Past Surgical History:   Procedure Laterality Date   • BILATERAL VATS ABLATION     • CAROTID STENT      Transcath placement of intrathoracic carotid artery stent    • MALIGNANT SKIN LESION EXCISION      Trunk    • NERVE BLOCK Bilateral 12/28/2018    Procedure: L3 L4 L5 S1 Medical Branch Block #2 ([id number]); Surgeon: Chaka Beatty MD;  Location: [facility] MAIN OR;  Service: Pain Management    • NERVE BLOCK Bilateral 12/21/2018    Procedure: L3 L4 L5 S1 Medial Branch Block #1 ([id number]);   Surgeon: Chaka Beatty MD;  Location: [facility] MAIN OR;  Service: Pain Management    • RADIOFREQUENCY ABLATION Right 1/4/2019    Procedure: L3 L4 L5 S1 Radio Frequency Ablation;  Surgeon: Chaka Beatty MD;  Location: [facility] MAIN OR;  Service: Pain Management    • RADIOFREQUENCY ABLATION Left 1/18/2019    Procedure: L3 L4 L5 S1 Radio Frequency Ablation;  Surgeon: Chaka Beatty MD; Location: [facility] MAIN OR;  Service: Pain Management    • SENTINEL LYMPH NODE BIOPSY        Family History:     Family History   Problem Relation Age of Onset   • ALS Mother    • Prostate cancer Father    • Pancreatic cancer Father    • Breast cancer Sister    • Arrhythmia Neg Hx    • Clotting disorder Neg Hx    • Fainting Neg Hx    • Heart attack Neg Hx    • Heart disease Neg Hx    • Hypertension Neg Hx    • Hyperlipidemia Neg Hx    • Anuerysm Neg Hx    • Stroke Neg Hx       Social History:     Social History     Socioeconomic History   • Marital status: Single     Spouse name: None   • Number of children: None   • Years of education: None   • Highest education level: None   Occupational History   • None   Tobacco Use   • Smoking status: Former   • Smokeless tobacco: Former   Substance and Sexual Activity   • Alcohol use: No   • Drug use: No   • Sexual activity: None   Other Topics Concern   • None   Social History Narrative    History of never a smoker, resolved 1/12/2015 - As per [employer]      Social Determinants of Health     Financial Resource Strain: Low Risk    • Difficulty of Paying Living Expenses: Not very hard   Food Insecurity: Not on file   Transportation Needs: No Transportation Needs   • Lack of Transportation (Medical): No   • Lack of Transportation (Non-Medical): No   Physical Activity: Not on file   Stress: Not on file   Social Connections: Not on file   Intimate Partner Violence: Not on file   Housing Stability: Not on file      Medications and Allergies:     Current Outpatient Medications   Medication Sig Dispense Refill   • aspirin 1 mg/mL SUSP Take 162 mg by mouth       • atorvastatin (LIPITOR) 40 mg tablet Take 1 tablet (40 mg total) by mouth daily 90 tablet 3   • metoprolol succinate (TOPROL-XL) 50 mg 24 hr tablet Take 1 tablet (50 mg total) by mouth 2 (two) times a day 180 tablet 3   • nitroglycerin (NITROSTAT) 0 4 mg SL tablet Place 1 tablet under the tongue       No current facility-administered medications for this visit  No Known Allergies   Immunizations:     Immunization History   Administered Date(s) Administered   • COVID-19 PFIZER VACCINE 0 3 ML IM 02/26/2021, 03/18/2021   • INFLUENZA 09/14/2014, 09/15/2018, 09/30/2019   • Influenza Split High Dose Preservative Free IM 08/30/2015, 09/24/2016, 09/30/2017   • Influenza, seasonal, injectable, preservative free 11/12/2014   • Pneumococcal Conjugate 13-Valent 09/26/2015   • Pneumococcal Polysaccharide PPV23 09/14/2014, 11/12/2014, 10/07/2017   • Zoster 11/24/2013      Health Maintenance:         Topic Date Due   • Hepatitis C Screening  Never done   • Colorectal Cancer Screening  12/26/2022         Topic Date Due   • COVID-19 Vaccine (3 - Booster for Pfizer series) 05/13/2021   • Influenza Vaccine (1) 09/01/2022      Medicare Screening Tests and Risk Assessments:     Maria Antonia Villela is here for his Subsequent Wellness visit  Health Risk Assessment:   Patient rates overall health as good  Patient feels that their physical health rating is same  Patient is satisfied with their life  Eyesight was rated as same  Hearing was rated as same  Patient feels that their emotional and mental health rating is same  Patients states they are never, rarely angry  Patient states they are sometimes unusually tired/fatigued  Pain experienced in the last 7 days has been some  Patient's pain rating has been 3/10  Patient states that he has experienced no weight loss or gain in last 6 months  Patient reports chronic back pain  Depression Screening:   PHQ-2 Score: 0      Fall Risk Screening: In the past year, patient has experienced: no history of falling in past year      Home Safety:  Patient does not have trouble with stairs inside or outside of their home  Patient has working smoke alarms and has working carbon monoxide detector  Home safety hazards include: none  Nutrition:   Current diet is Regular       Medications:   Patient is currently taking over-the-counter supplements  OTC medications include: cq10, magnesium, b12,multivitamin  Patient is able to manage medications  Activities of Daily Living (ADLs)/Instrumental Activities of Daily Living (IADLs):   Walk and transfer into and out of bed and chair?: Yes  Dress and groom yourself?: Yes    Bathe or shower yourself?: Yes    Feed yourself? Yes  Do your laundry/housekeeping?: Yes  Manage your money, pay your bills and track your expenses?: Yes  Make your own meals?: Yes    Do your own shopping?: Yes    Previous Hospitalizations:   Any hospitalizations or ED visits within the last 12 months?: No      Advance Care Planning:   Living will: Yes    Durable POA for healthcare: Yes    Advanced directive: Yes    Advanced directive counseling given: Yes      Cognitive Screening:   Provider or family/friend/caregiver concerned regarding cognition?: No    PREVENTIVE SCREENINGS      Cardiovascular Screening:    General: Risks and Benefits Discussed    Due for: Lipid Panel      Diabetes Screening:     General: Risks and Benefits Discussed    Due for: Blood Glucose      Colorectal Cancer Screening:     General: Risks and Benefits Discussed    Due for: Cologuard      Prostate Cancer Screening:    General: Risks and Benefits Discussed and Patient Declines      Osteoporosis Screening:    General: Risks and Benefits Discussed and Patient Declines      Abdominal Aortic Aneurysm (AAA) Screening:    Risk factors include: age between 73-67 yo and tobacco use        General: Risks and Benefits Discussed and Patient Declines      Lung Cancer Screening:     General: Screening Not Indicated      Hepatitis C Screening:    General: Risks and Benefits Discussed and Patient Declines    Screening, Brief Intervention, and Referral to Treatment (SBIRT)    Screening  Typical number of drinks in a day: 0  Typical number of drinks in a week: 0  Interpretation: Low risk drinking behavior      AUDIT-C Screenin) How often did you have a drink containing alcohol in the past year? never  2) How many drinks did you have on a typical day when you were drinking in the past year? 0  3) How often did you have 6 or more drinks on one occasion in the past year? never    AUDIT-C Score: 0  Interpretation: Score 0-3 (male): Negative screen for alcohol misuse    Single Item Drug Screening:  How often have you used an illegal drug (including marijuana) or a prescription medication for non-medical reasons in the past year? never    Single Item Drug Screen Score: 0  Interpretation: Negative screen for possible drug use disorder    Brief Intervention  Alcohol & drug use screenings were reviewed  No concerns regarding substance use disorder identified  Other Counseling Topics:   Car/seat belt/driving safety, skin self-exam, sunscreen and calcium and vitamin D intake and regular weightbearing exercise  No results found  Physical Exam:     /78   Pulse 72   Resp 16   Ht 5' 10" (1 778 m)   Wt 123 kg (272 lb)   SpO2 97%   BMI 39 03 kg/m²     Physical Exam  Vitals reviewed  Constitutional:       General: He is not in acute distress  Appearance: He is obese  He is not ill-appearing or diaphoretic  HENT:      Right Ear: Tympanic membrane, ear canal and external ear normal       Left Ear: Tympanic membrane, ear canal and external ear normal       Nose: Nose normal       Mouth/Throat:      Mouth: Mucous membranes are moist       Pharynx: Oropharynx is clear  No posterior oropharyngeal erythema  Eyes:      Conjunctiva/sclera: Conjunctivae normal    Cardiovascular:      Rate and Rhythm: Normal rate and regular rhythm  Pulses: Normal pulses  Heart sounds: Normal heart sounds  Pulmonary:      Effort: Pulmonary effort is normal  No respiratory distress  Breath sounds: Normal breath sounds  No wheezing  Lymphadenopathy:      Cervical: No cervical adenopathy  Skin:     Findings: No rash     Neurological:      Mental Status: He is alert and oriented to person, place, and time     Psychiatric:         Mood and Affect: Mood normal           ANTONIO Moreno

## 2023-02-06 NOTE — PATIENT INSTRUCTIONS
Medicare Preventive Visit Patient Instructions  Thank you for completing your Welcome to Medicare Visit or Medicare Annual Wellness Visit today  Your next wellness visit will be due in one year (2/7/2024)  The screening/preventive services that you may require over the next 5-10 years are detailed below  Some tests may not apply to you based off risk factors and/or age  Screening tests ordered at today's visit but not completed yet may show as past due  Also, please note that scanned in results may not display below  Preventive Screenings:  Service Recommendations Previous Testing/Comments   Colorectal Cancer Screening  · Colonoscopy    · Fecal Occult Blood Test (FOBT)/Fecal Immunochemical Test (FIT)  · Fecal DNA/Cologuard Test  · Flexible Sigmoidoscopy Age: 39-70 years old   Colonoscopy: every 10 years (May be performed more frequently if at higher risk)  OR  FOBT/FIT: every 1 year  OR  Cologuard: every 3 years  OR  Sigmoidoscopy: every 5 years  Screening may be recommended earlier than age 39 if at higher risk for colorectal cancer  Also, an individualized decision between you and your healthcare provider will decide whether screening between the ages of 74-80 would be appropriate   Colonoscopy: Not on file  FOBT/FIT: Not on file  Cologuard: Not on file  Sigmoidoscopy: Not on file          Prostate Cancer Screening Individualized decision between patient and health care provider in men between ages of 53-78   Medicare will cover every 12 months beginning on the day after your 50th birthday PSA: 1 0 ng/mL           Hepatitis C Screening Once for adults born between [facility]  More frequently in patients at high risk for Hepatitis C Hep C Antibody: Not on file        Diabetes Screening 1-2 times per year if you're at risk for diabetes or have pre-diabetes Fasting glucose: 74 mg/dL (7/16/2020)  A1C: No results in last 5 years (No results in last 5 years)      Cholesterol Screening Once every 5 years if you don't have a lipid disorder  May order more often based on risk factors  Lipid panel: 02/26/2019  Screening Current      Other Preventive Screenings Covered by Medicare:  1  Abdominal Aortic Aneurysm (AAA) Screening: covered once if your at risk  You're considered to be at risk if you have a family history of AAA or a male between the age of 73-68 who smoking at least 100 cigarettes in your lifetime  2  Lung Cancer Screening: covers low dose CT scan once per year if you meet all of the following conditions: (1) Age 50-69; (2) No signs or symptoms of lung cancer; (3) Current smoker or have quit smoking within the last 15 years; (4) You have a tobacco smoking history of at least 20 pack years (packs per day x number of years you smoked); (5) You get a written order from a healthcare provider  3  Glaucoma Screening: covered annually if you're considered high risk: (1) You have diabetes OR (2) Family history of glaucoma OR (3)  aged 48 and older OR (3)  American aged 72 and older  3  Osteoporosis Screening: covered every 2 years if you meet one of the following conditions: (1) Have a vertebral abnormality; (2) On glucocorticoid therapy for more than 3 months; (3) Have primary hyperparathyroidism; (4) On osteoporosis medications and need to assess response to drug therapy  5  HIV Screening: covered annually if you're between the age of 12-76  Also covered annually if you are younger than 13 and older than 72 with risk factors for HIV infection  For pregnant patients, it is covered up to 3 times per pregnancy      Immunizations:  Immunization Recommendations   Influenza Vaccine Annual influenza vaccination during flu season is recommended for all persons aged >= 6 months who do not have contraindications   Pneumococcal Vaccine   * Pneumococcal conjugate vaccine = PCV13 (Prevnar 13), PCV15 (Vaxneuvance), PCV20 (Prevnar 20)  * Pneumococcal polysaccharide vaccine = PPSV23 (Pneumovax) Adults 19-64 years old: 1-3 doses may be recommended based on certain risk factors  Adults 72 years old: 1-2 doses may be recommended based off what pneumonia vaccine you previously received   Hepatitis B Vaccine 3 dose series if at intermediate or high risk (ex: diabetes, end stage renal disease, liver disease)   Tetanus (Td) Vaccine - COST NOT COVERED BY MEDICARE PART B Following completion of primary series, a booster dose should be given every 10 years to maintain immunity against tetanus  Td may also be given as tetanus wound prophylaxis  Tdap Vaccine - COST NOT COVERED BY MEDICARE PART B Recommended at least once for all adults  For pregnant patients, recommended with each pregnancy  Shingles Vaccine (Shingrix) - COST NOT COVERED BY MEDICARE PART B  2 shot series recommended in those aged 48 and above     Health Maintenance Due:      Topic Date Due   • Hepatitis C Screening  Never done   • Colorectal Cancer Screening  12/26/2022     Immunizations Due:      Topic Date Due   • COVID-19 Vaccine (3 - Booster for Pfizer series) 05/13/2021   • Influenza Vaccine (1) 09/01/2022     Advance Directives   What are advance directives? Advance directives are legal documents that state your wishes and plans for medical care  These plans are made ahead of time in case you lose your ability to make decisions for yourself  Advance directives can apply to any medical decision, such as the treatments you want, and if you want to donate organs  What are the types of advance directives? There are many types of advance directives, and each state has rules about how to use them  You may choose a combination of any of the following:  · Living will: This is a written record of the treatment you want  You can also choose which treatments you do not want, which to limit, and which to stop at a certain time  This includes surgery, medicine, IV fluid, and tube feedings  · Durable power of  for healthcare [facility]):   This is a written record that states who you want to make healthcare choices for you when you are unable to make them for yourself  This person, called a proxy, is usually a family member or a friend  You may choose more than 1 proxy  · Do not resuscitate (DNR) order:  A DNR order is used in case your heart stops beating or you stop breathing  It is a request not to have certain forms of treatment, such as CPR  A DNR order may be included in other types of advance directives  · Medical directive: This covers the care that you want if you are in a coma, near death, or unable to make decisions for yourself  You can list the treatments you want for each condition  Treatment may include pain medicine, surgery, blood transfusions, dialysis, IV or tube feedings, and a ventilator (breathing machine)  · Values history: This document has questions about your views, beliefs, and how you feel and think about life  This information can help others choose the care that you would choose  Why are advance directives important? An advance directive helps you control your care  Although spoken wishes may be used, it is better to have your wishes written down  Spoken wishes can be misunderstood, or not followed  Treatments may be given even if you do not want them  An advance directive may make it easier for your family to make difficult choices about your care  Weight Management   Why it is important to manage your weight:  Being overweight increases your risk of health conditions such as heart disease, high blood pressure, type 2 diabetes, and certain types of cancer  It can also increase your risk for osteoarthritis, sleep apnea, and other respiratory problems  Aim for a slow, steady weight loss  Even a small amount of weight loss can lower your risk of health problems  How to lose weight safely:  A safe and healthy way to lose weight is to eat fewer calories and get regular exercise   You can lose up about 1 pound a week by decreasing the number of calories you eat by 500 calories each day  Healthy meal plan for weight management:  A healthy meal plan includes a variety of foods, contains fewer calories, and helps you stay healthy  A healthy meal plan includes the following:  · Eat whole-grain foods more often  A healthy meal plan should contain fiber  Fiber is the part of grains, fruits, and vegetables that is not broken down by your body  Whole-grain foods are healthy and provide extra fiber in your diet  Some examples of whole-grain foods are whole-wheat breads and pastas, oatmeal, brown rice, and bulgur  · Eat a variety of vegetables every day  Include dark, leafy greens such as spinach, kale, miles greens, and mustard greens  Eat yellow and orange vegetables such as carrots, sweet potatoes, and winter squash  · Eat a variety of fruits every day  Choose fresh or canned fruit (canned in its own juice or light syrup) instead of juice  Fruit juice has very little or no fiber  · Eat low-fat dairy foods  Drink fat-free (skim) milk or 1% milk  Eat fat-free yogurt and low-fat cottage cheese  Try low-fat cheeses such as mozzarella and other reduced-fat cheeses  · Choose meat and other protein foods that are low in fat  Choose beans or other legumes such as split peas or lentils  Choose fish, skinless poultry (chicken or turkey), or lean cuts of red meat (beef or pork)  Before you cook meat or poultry, cut off any visible fat  · Use less fat and oil  Try baking foods instead of frying them  Add less fat, such as margarine, sour cream, regular salad dressing and mayonnaise to foods  Eat fewer high-fat foods  Some examples of high-fat foods include french fries, doughnuts, ice cream, and cakes  · Eat fewer sweets  Limit foods and drinks that are high in sugar  This includes candy, cookies, regular soda, and sweetened drinks  Exercise:  Exercise at least 30 minutes per day on most days of the week   Some examples of exercise include walking, biking, dancing, and swimming  You can also fit in more physical activity by taking the stairs instead of the elevator or parking farther away from stores  Ask your healthcare provider about the best exercise plan for you  © Copyright [employer] 2018 Information is for End User's use only and may not be sold, redistributed or otherwise used for commercial purposes   All illustrations and images included in CareNotes® are the copyrighted property of A D A M , Inc  or [address] [place] [address]

## 2023-02-07 ENCOUNTER — APPOINTMENT (OUTPATIENT)
Dept: LAB | Facility: CLINIC | Age: 73
End: 2023-02-07

## 2023-02-07 DIAGNOSIS — I10 BENIGN ESSENTIAL HYPERTENSION: ICD-10-CM

## 2023-02-07 DIAGNOSIS — D72.829 LEUKOCYTOSIS, UNSPECIFIED TYPE: ICD-10-CM

## 2023-02-07 DIAGNOSIS — I25.10 CORONARY ARTERY DISEASE INVOLVING NATIVE HEART WITHOUT ANGINA PECTORIS, UNSPECIFIED VESSEL OR LESION TYPE: ICD-10-CM

## 2023-02-07 DIAGNOSIS — E66.01 MORBID OBESITY (HCC): ICD-10-CM

## 2023-02-07 LAB
ALBUMIN SERPL BCP-MCNC: 3.4 G/DL (ref 3.5–5)
ALP SERPL-CCNC: 78 U/L (ref 46–116)
ALT SERPL W P-5'-P-CCNC: 38 U/L (ref 12–78)
ANION GAP SERPL CALCULATED.3IONS-SCNC: 2 MMOL/L (ref 4–13)
AST SERPL W P-5'-P-CCNC: 31 U/L (ref 5–45)
BASOPHILS # BLD AUTO: 0.05 THOUSANDS/ÂΜL (ref 0–0.1)
BASOPHILS NFR BLD AUTO: 1 % (ref 0–1)
BILIRUB SERPL-MCNC: 1.26 MG/DL (ref 0.2–1)
BUN SERPL-MCNC: 20 MG/DL (ref 5–25)
CALCIUM ALBUM COR SERPL-MCNC: 10.1 MG/DL (ref 8.3–10.1)
CALCIUM SERPL-MCNC: 9.6 MG/DL (ref 8.3–10.1)
CHLORIDE SERPL-SCNC: 104 MMOL/L (ref 96–108)
CHOLEST SERPL-MCNC: 125 MG/DL
CO2 SERPL-SCNC: 32 MMOL/L (ref 21–32)
CREAT SERPL-MCNC: 0.9 MG/DL (ref 0.6–1.3)
EOSINOPHIL # BLD AUTO: 0.29 THOUSAND/ÂΜL (ref 0–0.61)
EOSINOPHIL NFR BLD AUTO: 4 % (ref 0–6)
ERYTHROCYTE [DISTWIDTH] IN BLOOD BY AUTOMATED COUNT: 12.4 % (ref 11.6–15.1)
GFR SERPL CREATININE-BSD FRML MDRD: 83 ML/MIN/1.73SQ M
GLUCOSE P FAST SERPL-MCNC: 87 MG/DL (ref 65–99)
HCT VFR BLD AUTO: 45.5 % (ref 36.5–49.3)
HDLC SERPL-MCNC: 46 MG/DL
HGB BLD-MCNC: 14.7 G/DL (ref 12–17)
IMM GRANULOCYTES # BLD AUTO: 0.03 THOUSAND/UL (ref 0–0.2)
IMM GRANULOCYTES NFR BLD AUTO: 0 % (ref 0–2)
LDLC SERPL CALC-MCNC: 61 MG/DL (ref 0–100)
LYMPHOCYTES # BLD AUTO: 2.43 THOUSANDS/ÂΜL (ref 0.6–4.47)
LYMPHOCYTES NFR BLD AUTO: 29 % (ref 14–44)
MCH RBC QN AUTO: 31.7 PG (ref 26.8–34.3)
MCHC RBC AUTO-ENTMCNC: 32.3 G/DL (ref 31.4–37.4)
MCV RBC AUTO: 98 FL (ref 82–98)
MONOCYTES # BLD AUTO: 0.66 THOUSAND/ÂΜL (ref 0.17–1.22)
MONOCYTES NFR BLD AUTO: 8 % (ref 4–12)
NEUTROPHILS # BLD AUTO: 4.94 THOUSANDS/ÂΜL (ref 1.85–7.62)
NEUTS SEG NFR BLD AUTO: 58 % (ref 43–75)
NRBC BLD AUTO-RTO: 0 /100 WBCS
PLATELET # BLD AUTO: 226 THOUSANDS/UL (ref 149–390)
PMV BLD AUTO: 9.2 FL (ref 8.9–12.7)
POTASSIUM SERPL-SCNC: 4.8 MMOL/L (ref 3.5–5.3)
PROT SERPL-MCNC: 7 G/DL (ref 6.4–8.4)
RBC # BLD AUTO: 4.63 MILLION/UL (ref 3.88–5.62)
SODIUM SERPL-SCNC: 138 MMOL/L (ref 135–147)
TRIGL SERPL-MCNC: 91 MG/DL
TSH SERPL DL<=0.05 MIU/L-ACNC: 1.47 UIU/ML (ref 0.45–4.5)
WBC # BLD AUTO: 8.4 THOUSAND/UL (ref 4.31–10.16)

## 2023-02-21 LAB — COLOGUARD RESULT REPORTABLE: NEGATIVE

## 2023-02-22 ENCOUNTER — TELEPHONE (OUTPATIENT)
Dept: FAMILY MEDICINE CLINIC | Facility: CLINIC | Age: 73
End: 2023-02-22

## 2023-02-22 NOTE — TELEPHONE ENCOUNTER
----- Message from [id number] [facility] sent at 2/22/2023  8:44 AM EST -----  Please let the patient know that his cologuard was negative

## 2023-03-20 ENCOUNTER — OFFICE VISIT (OUTPATIENT)
Dept: FAMILY MEDICINE CLINIC | Facility: CLINIC | Age: 73
End: 2023-03-20

## 2023-03-20 VITALS
BODY MASS INDEX: 38.51 KG/M2 | HEART RATE: 71 BPM | OXYGEN SATURATION: 95 % | SYSTOLIC BLOOD PRESSURE: 120 MMHG | RESPIRATION RATE: 18 BRPM | HEIGHT: 70 IN | DIASTOLIC BLOOD PRESSURE: 60 MMHG | WEIGHT: 269 LBS

## 2023-03-20 DIAGNOSIS — I25.10 CORONARY ARTERY DISEASE INVOLVING NATIVE HEART WITHOUT ANGINA PECTORIS, UNSPECIFIED VESSEL OR LESION TYPE: ICD-10-CM

## 2023-03-20 DIAGNOSIS — M51.16 LUMBAR DISC HERNIATION WITH RADICULOPATHY: ICD-10-CM

## 2023-03-20 DIAGNOSIS — E66.9 OBESITY (BMI 30-39.9): ICD-10-CM

## 2023-03-20 DIAGNOSIS — R17 ELEVATED BILIRUBIN: ICD-10-CM

## 2023-03-20 DIAGNOSIS — I10 BENIGN ESSENTIAL HYPERTENSION: Primary | ICD-10-CM

## 2023-03-20 NOTE — PROGRESS NOTES
Name: Daisy Dinh      : 1950      MRN: [id number]  Encounter Provider: ANTONIO Monzon  Encounter Date: 3/20/2023   Encounter department: [facility]     1  Benign essential hypertension  /60  Continue to follow-up with cardiology  2  Coronary artery disease involving native heart without angina pectoris, unspecified vessel or lesion type  Denies any chest pain or SOB  Continue to follow-up with cardiology  3  Obesity (BMI 30-39  9)  Patient instructed to eat a healthy low fat diet  4  Elevated bilirubin  Bilirubin is 1 26  Patient instructed to continue to follow-up with gastroenterology  5  Lumbar disc herniation with radiculopathy  Patient instructed to follow-up with his pain management specialist      Reviewed lab results with the patient  Patient instructed to follow-up in 6 months or sooner prn  Subjective      Patient is here for a follow-up for chronic medical conditions  Patient follows up with cardiology for CAD and HTN  Denies any chest pain or SOB  Patient takes metoprolol for HTN  Denies any dizziness or Has  Patient has seen gastroenterology in the past for elevated bilirubin  Denies any abdominal pain, nausea, vomiting, or diarrhea  Patient's BMI is 38 60  Patient reports that he has not been watching his diet recently or exercising on a regular basis  Patient reports that he has not followed up with pain management recently for chronic low back pain  Review of Systems   Constitutional: Negative for chills and fever  HENT: Negative for ear pain, sinus pressure, sore throat and trouble swallowing  Eyes: Negative for pain, discharge and redness  Respiratory: Negative for cough, chest tightness, shortness of breath and wheezing  Cardiovascular: Negative for chest pain and palpitations     Gastrointestinal: Negative for abdominal pain, blood in stool, diarrhea, nausea and vomiting  Genitourinary: Negative for dysuria, frequency, hematuria and urgency  Skin: Negative for rash  Neurological: Negative for dizziness, seizures, syncope, light-headedness and headaches  Current Outpatient Medications on File Prior to Visit   Medication Sig   • aspirin 1 mg/mL SUSP Take 162 mg by mouth  • atorvastatin (LIPITOR) 40 mg tablet Take 1 tablet (40 mg total) by mouth daily   • metoprolol succinate (TOPROL-XL) 50 mg 24 hr tablet Take 1 tablet (50 mg total) by mouth 2 (two) times a day   • nitroglycerin (NITROSTAT) 0 4 mg SL tablet Place 1 tablet under the tongue       Objective     /60   Pulse 71   Resp 18   Ht 5' 10" (1 778 m)   Wt 122 kg (269 lb)   SpO2 95%   BMI 38 60 kg/m²     Physical Exam  Vitals reviewed  Constitutional:       General: He is not in acute distress  Appearance: He is obese  He is not ill-appearing or diaphoretic  HENT:      Right Ear: External ear normal       Left Ear: External ear normal       Nose: Nose normal       Mouth/Throat:      Mouth: Mucous membranes are moist       Pharynx: Oropharynx is clear  No posterior oropharyngeal erythema  Eyes:      Conjunctiva/sclera: Conjunctivae normal    Cardiovascular:      Rate and Rhythm: Normal rate and regular rhythm  Pulses: Normal pulses  Heart sounds: Normal heart sounds  Pulmonary:      Effort: Pulmonary effort is normal  No respiratory distress  Breath sounds: Normal breath sounds  No wheezing  Musculoskeletal:      Comments: Gait wnl  Skin:     Findings: No rash  Neurological:      Mental Status: He is alert and oriented to person, place, and time     Psychiatric:         Mood and Affect: Mood normal        ANTONIO Kam

## 2023-04-07 PROBLEM — Z00.00 MEDICARE ANNUAL WELLNESS VISIT, SUBSEQUENT: Status: RESOLVED | Noted: 2023-02-06 | Resolved: 2023-04-07

## 2023-05-11 ENCOUNTER — OFFICE VISIT (OUTPATIENT)
Dept: CARDIOLOGY CLINIC | Facility: CLINIC | Age: 73
End: 2023-05-11

## 2023-05-11 VITALS
HEIGHT: 70 IN | WEIGHT: 267 LBS | BODY MASS INDEX: 38.22 KG/M2 | SYSTOLIC BLOOD PRESSURE: 130 MMHG | OXYGEN SATURATION: 97 % | DIASTOLIC BLOOD PRESSURE: 82 MMHG | HEART RATE: 78 BPM

## 2023-05-11 DIAGNOSIS — I49.3 PVC'S (PREMATURE VENTRICULAR CONTRACTIONS): ICD-10-CM

## 2023-05-11 DIAGNOSIS — I25.10 CORONARY ARTERY DISEASE INVOLVING NATIVE CORONARY ARTERY OF NATIVE HEART WITHOUT ANGINA PECTORIS: Primary | ICD-10-CM

## 2023-05-11 NOTE — PROGRESS NOTES
Cardiology Follow Up    Michael Frames  1950  [id number]  [facility] [place]  [address]  [address]  [place] [id number]  [phone]    1  Coronary artery disease involving native coronary artery of native heart without angina pectoris        2  PVC's (premature ventricular contractions)            Interval History: Followup CAD    Doing well  No chest pain, dyspnea or palpitations  Medical Problems     Problem List     Chronic pain syndrome    Chronic bilateral low back pain without sciatica    Chronic back pain    Weakness    Coronary artery disease    Benign essential hypertension    Leukocytosis    PVC's (premature ventricular contractions)    Irregular heart rhythm    Obesity (BMI 30-39  9)    Medicare annual wellness visit, initial    Elevated bilirubin    [facility] )    Encounter for prostate cancer screening    Screening for colon cancer    Lumbar disc herniation with radiculopathy    Degenerative lumbar spinal stenosis    Adolescent idiopathic scoliosis of lumbar region    Morbid obesity ([facility] )        Past Medical History:   Diagnosis Date   • Arthritis    • Coronary artery disease involving native coronary artery of native heart without angina pectoris    • Edema, lower extremity     Last assessed 1/12/2015    • Hyperlipidemia    • Hypertension    • Melanoma ([facility] )     back, removed   • Stented coronary artery      Social History     Socioeconomic History   • Marital status: Single     Spouse name: Not on file   • Number of children: Not on file   • Years of education: Not on file   • Highest education level: Not on file   Occupational History   • Not on file   Tobacco Use   • Smoking status: Former   • Smokeless tobacco: Former   Vaping Use   • Vaping Use: Never used   Substance and Sexual Activity   • Alcohol use: No   • Drug use: No   • Sexual activity: Not on file   Other Topics Concern   • Not on file Social History Narrative    History of never a smoker, resolved 1/12/2015 - As per [employer]      Social Determinants of Health     Financial Resource Strain: Low Risk    • Difficulty of Paying Living Expenses: Not very hard   Food Insecurity: Not on file   Transportation Needs: No Transportation Needs   • Lack of Transportation (Medical): No   • Lack of Transportation (Non-Medical): No   Physical Activity: Not on file   Stress: Not on file   Social Connections: Not on file   Intimate Partner Violence: Not on file   Housing Stability: Not on file      Family History   Problem Relation Age of Onset   • ALS Mother    • Prostate cancer Father    • Pancreatic cancer Father    • Breast cancer Sister    • Arrhythmia Neg Hx    • Clotting disorder Neg Hx    • Fainting Neg Hx    • Heart attack Neg Hx    • Heart disease Neg Hx    • Hypertension Neg Hx    • Hyperlipidemia Neg Hx    • Anuerysm Neg Hx    • Stroke Neg Hx      Past Surgical History:   Procedure Laterality Date   • BILATERAL VATS ABLATION     • CAROTID STENT      Transcath placement of intrathoracic carotid artery stent    • MALIGNANT SKIN LESION EXCISION      Trunk    • NERVE BLOCK Bilateral 12/28/2018    Procedure: L3 L4 L5 S1 Medical Branch Block #2 ([id number]); Surgeon: Jimenez Rodriguez MD;  Location: [facility] MAIN OR;  Service: Pain Management    • NERVE BLOCK Bilateral 12/21/2018    Procedure: L3 L4 L5 S1 Medial Branch Block #1 ([id number]);   Surgeon: Jimenez Rodriguez MD;  Location: [facility] MAIN OR;  Service: Pain Management    • RADIOFREQUENCY ABLATION Right 1/4/2019    Procedure: L3 L4 L5 S1 Radio Frequency Ablation;  Surgeon: Jimenez Rodriguez MD;  Location[facility];  Service: Pain Management    • RADIOFREQUENCY ABLATION Left 1/18/2019    Procedure: L3 L4 L5 S1 Radio Frequency Ablation;  Surgeon: Jimenez Rodriguez MD;  Location: [facility] MAIN OR;  Service: Pain Management    • SENTINEL LYMPH NODE BIOPSY         Current Outpatient Medications: •  aspirin 1 mg/mL SUSP, Take 162 mg by mouth , Disp: , Rfl:   •  atorvastatin (LIPITOR) 40 mg tablet, Take 1 tablet (40 mg total) by mouth daily, Disp: 90 tablet, Rfl: 3  •  fluticasone (FLONASE) 50 mcg/act nasal spray, 1 spray into each nostril 2 (two) times a day, Disp: 16 mL, Rfl: 1  •  metoprolol succinate (TOPROL-XL) 50 mg 24 hr tablet, Take 1 tablet (50 mg total) by mouth 2 (two) times a day, Disp: 180 tablet, Rfl: 3  •  nitroglycerin (NITROSTAT) 0 4 mg SL tablet, Place 1 tablet under the tongue, Disp: , Rfl:   No Known Allergies    Labs:     Chemistry        Component Value Date/Time     03/07/2015 1123    K 4 8 02/07/2023 1041    K 4 6 03/07/2015 1123     02/07/2023 1041     03/07/2015 1123    CO2 32 02/07/2023 1041    CO2 31 03/07/2015 1123    BUN 20 02/07/2023 1041    BUN 22 03/07/2015 1123    CREATININE 0 90 02/07/2023 1041    CREATININE 0 92 03/07/2015 1123        Component Value Date/Time    CALCIUM 9 6 02/07/2023 1041    CALCIUM 9 5 03/07/2015 1123    ALKPHOS 78 02/07/2023 1041    ALKPHOS 81 07/13/2015 1108    AST 31 02/07/2023 1041    AST 34 07/13/2015 1108    ALT 38 02/07/2023 1041    ALT 14 07/13/2015 1108    BILITOT 1 3 (H) 07/13/2015 1108            Lab Results   Component Value Date    CHOL 107 07/13/2015    CHOL 105 02/05/2015     Lab Results   Component Value Date    HDL 46 02/07/2023    HDL 41 02/26/2019    HDL 42 02/16/2018     Lab Results   Component Value Date    LDLCALC 61 02/07/2023    LDLCALC 57 02/26/2019    LDLCALC 48 02/16/2018     Lab Results   Component Value Date    TRIG 91 02/07/2023    TRIG 85 02/26/2019    TRIG 105 02/16/2018     No results found for: CHOLHDL    Imaging: No results found  Review of Systems   Constitutional: Negative  HENT: Negative  Eyes: Negative  Cardiovascular: Negative  Respiratory: Negative  Endocrine: Negative  Hematologic/Lymphatic: Negative  Skin: Negative  Musculoskeletal: Negative      Gastrointestinal: Negative  Genitourinary: Negative  Neurological: Negative  Psychiatric/Behavioral: Negative  Allergic/Immunologic: Negative  Vitals:    05/11/23 0928   BP: 130/82   Pulse: 78   SpO2: 97%           Physical Exam  Vitals reviewed  Constitutional:       Appearance: Normal appearance  HENT:      Head: Normocephalic  Nose: Nose normal       Mouth/Throat:      Mouth: Mucous membranes are moist       Pharynx: Oropharynx is clear  Eyes:      General: No scleral icterus  Conjunctiva/sclera: Conjunctivae normal    Cardiovascular:      Rate and Rhythm: Normal rate and regular rhythm  Heart sounds: No murmur heard  No friction rub  No gallop  Pulmonary:      Effort: Pulmonary effort is normal  No respiratory distress  Breath sounds: Normal breath sounds  No wheezing or rales  Abdominal:      General: Abdomen is flat  Bowel sounds are normal  There is no distension  Palpations: Abdomen is soft  Tenderness: There is no abdominal tenderness  There is no guarding  Musculoskeletal:      Cervical back: Normal range of motion and neck supple  Right lower leg: No edema  Left lower leg: No edema  Skin:     General: Skin is warm and dry  Neurological:      General: No focal deficit present  Mental Status: He is alert and oriented to person, place, and time  Psychiatric:         Mood and Affect: Mood normal          Behavior: Behavior normal          Discussion/Summary:    1  Coronary Artery Disease: Hx of YOGESH to OM and RPDA  Overall stable  Continue medical therapy  LDL was 61  He has no angina       2  HTN: BP is stable on medical therapy       3  PVCs Continue with  Metoprolol  Asymptomatic  EF normal on last echocardiogram      The patient was counseled regarding diagnostic results, instructions for management, risk factor reductions, impressions  total time of encounter was 25 minutes and 15 minutes was spent counseling

## 2023-08-16 DIAGNOSIS — I49.3 PVC (PREMATURE VENTRICULAR CONTRACTION): ICD-10-CM

## 2023-08-16 DIAGNOSIS — I25.10 CORONARY ARTERY DISEASE INVOLVING NATIVE CORONARY ARTERY OF NATIVE HEART WITHOUT ANGINA PECTORIS: ICD-10-CM

## 2023-08-17 RX ORDER — METOPROLOL SUCCINATE 50 MG/1
50 TABLET, EXTENDED RELEASE ORAL 2 TIMES DAILY
Qty: 180 TABLET | Refills: 3 | Status: SHIPPED | OUTPATIENT
Start: 2023-08-17

## 2023-09-20 ENCOUNTER — OFFICE VISIT (OUTPATIENT)
Dept: FAMILY MEDICINE CLINIC | Facility: CLINIC | Age: 73
End: 2023-09-20
Payer: MEDICARE

## 2023-09-20 VITALS
HEART RATE: 85 BPM | DIASTOLIC BLOOD PRESSURE: 80 MMHG | WEIGHT: 275 LBS | BODY MASS INDEX: 39.37 KG/M2 | HEIGHT: 70 IN | SYSTOLIC BLOOD PRESSURE: 126 MMHG | OXYGEN SATURATION: 97 % | RESPIRATION RATE: 16 BRPM

## 2023-09-20 DIAGNOSIS — E66.01 MORBID OBESITY (HCC): ICD-10-CM

## 2023-09-20 DIAGNOSIS — I25.10 CORONARY ARTERY DISEASE INVOLVING NATIVE CORONARY ARTERY OF NATIVE HEART WITHOUT ANGINA PECTORIS: ICD-10-CM

## 2023-09-20 DIAGNOSIS — I49.3 PVC'S (PREMATURE VENTRICULAR CONTRACTIONS): ICD-10-CM

## 2023-09-20 DIAGNOSIS — R17 ELEVATED BILIRUBIN: ICD-10-CM

## 2023-09-20 DIAGNOSIS — J30.9 ALLERGIC RHINITIS, UNSPECIFIED SEASONALITY, UNSPECIFIED TRIGGER: Primary | ICD-10-CM

## 2023-09-20 DIAGNOSIS — I10 BENIGN ESSENTIAL HYPERTENSION: ICD-10-CM

## 2023-09-20 PROBLEM — D72.829 LEUKOCYTOSIS: Status: RESOLVED | Noted: 2019-11-06 | Resolved: 2023-09-20

## 2023-09-20 PROCEDURE — 99214 OFFICE O/P EST MOD 30 MIN: CPT | Performed by: NURSE PRACTITIONER

## 2023-09-20 RX ORDER — FLUTICASONE PROPIONATE 50 MCG
1 SPRAY, SUSPENSION (ML) NASAL 2 TIMES DAILY
Qty: 16 ML | Refills: 1 | Status: SHIPPED | OUTPATIENT
Start: 2023-09-20

## 2023-09-20 NOTE — PROGRESS NOTES
Name: Aquilino Mcfarland      : 1950      MRN: [id number]  Encounter Provider: ANTONIO Whitney  Encounter Date: 2023   Encounter department: [address] [place] [address]     1. Allergic rhinitis, unspecified seasonality, unspecified trigger  -     fluticasone (FLONASE) 50 mcg/act nasal spray; 1 spray into each nostril 2 (two) times a day     Patient reports constant post nasal drip and nasal congestion for the past couple of years. Denies any fever, sore throat, or cough. Discussed with the patient that his symptoms are most likely caused by allergies. Patient instructed to consistently use his flonase. Refilled flonase nasal spray. Patient instructed to follow-up if symptoms get worse or do not get better. 2. Benign essential hypertension  -     Comprehensive metabolic panel; Future  -     CBC and differential; Future  -     Lipid Panel with Direct LDL reflex; Future    /80. Continue Metoprolol as prescribed by cardiology. 3. Coronary artery disease involving native coronary artery of native heart without angina pectoris  -     Comprehensive metabolic panel; Future  -     CBC and differential; Future  -     Lipid Panel with Direct LDL reflex; Future    Denies any chest pain or SOB. Continue to follow-up with cardiology. 4. PVC's (premature ventricular contractions)  Continue to follow-up with cardiology. 5. Elevated bilirubin  -     Comprehensive metabolic panel; Future    CMP ordered. Patient instructed to follow-up with his gastroenterologist.     6. Morbid obesity ([address])  -     Comprehensive metabolic panel; Future  -     CBC and differential; Future  -     Lipid Panel with Direct LDL reflex; Future    Patient instructed to eat a healthy low fat diet. Lab work ordered. Will follow-up results with the patient. Patient instructed to follow-up in 6 months or sooner prn.          Depression Screening and Follow-up Plan: Patient was screened for depression during today's encounter. They screened negative with a PHQ-2 score of 0. Subjective      Patient is here for a follow-up for chronic medical conditions. Patient reports constant post nasal drip and nasal congestion for the past couple of years. Denies any fever, sore throat, earache, or cough. Patient reports that he takes flonase nasal spray off and on. Patient follows up with cardiology for CAD, HTN, and PVCs. Patient takes metoprolol for HTN and PVCs. Patient takes atorvastatin for hyperlipidemia. Denies any chest pain, SOB, dizziness, or Has. Patient is here for a follow-up for obesity. Patient reports that he is not watching his diet. Patient reports that he did not follow-up with gastroenterology again for the elevated bilirubin. Denies any abdominal pain, nausea, or vomiting. Review of Systems   Constitutional: Negative for chills and fever. HENT: Positive for congestion and postnasal drip. Negative for ear pain and sore throat. Respiratory: Negative for cough, chest tightness, shortness of breath and wheezing. Cardiovascular: Negative for chest pain. Gastrointestinal: Negative for abdominal pain, blood in stool, diarrhea, nausea and vomiting. Genitourinary: Negative for dysuria and hematuria. Skin: Negative for rash. Neurological: Negative for dizziness, seizures, syncope, light-headedness and headaches. Psychiatric/Behavioral: Negative for suicidal ideas. Denies any depression. Current Outpatient Medications on File Prior to Visit   Medication Sig   • aspirin 1 mg/mL SUSP Take 162 mg by mouth.    • atorvastatin (LIPITOR) 40 mg tablet Take 1 tablet (40 mg total) by mouth daily   • metoprolol succinate (TOPROL-XL) 50 mg 24 hr tablet TAKE 1 TABLET BY MOUTH  TWICE DAILY   • nitroglycerin (NITROSTAT) 0.4 mg SL tablet Place 1 tablet under the tongue   • [DISCONTINUED] fluticasone (FLONASE) 50 mcg/act nasal spray 1 spray into each nostril 2 (two) times a day       Objective     /80 (BP Location: Right arm, Patient Position: Sitting, Cuff Size: Large)   Pulse 85   Resp 16   Ht 5' 10" (1.778 m)   Wt 125 kg (275 lb)   SpO2 97%   BMI 39.46 kg/m²     Physical Exam  Vitals reviewed. Constitutional:       General: He is not in acute distress. Appearance: He is obese. He is not ill-appearing or diaphoretic. HENT:      Right Ear: External ear normal.      Left Ear: External ear normal.      Nose: Congestion present. Mouth/Throat:      Mouth: Mucous membranes are moist.      Pharynx: Oropharynx is clear. No oropharyngeal exudate or posterior oropharyngeal erythema. Eyes:      Conjunctiva/sclera: Conjunctivae normal.      Pupils: Pupils are equal, round, and reactive to light. Cardiovascular:      Rate and Rhythm: Normal rate and regular rhythm. Pulses: Normal pulses. Heart sounds: Normal heart sounds. Pulmonary:      Effort: Pulmonary effort is normal. No respiratory distress. Breath sounds: Normal breath sounds. No wheezing. Musculoskeletal:      Comments: Gait wnl. Skin:     Findings: No rash. Neurological:      Mental Status: He is alert and oriented to person, place, and time.    Psychiatric:         Mood and Affect: Mood normal.       ANTONIO Mckeon

## 2023-09-22 ENCOUNTER — APPOINTMENT (OUTPATIENT)
Dept: LAB | Facility: CLINIC | Age: 73
End: 2023-09-22
Payer: MEDICARE

## 2023-09-22 DIAGNOSIS — I25.10 CORONARY ARTERY DISEASE INVOLVING NATIVE CORONARY ARTERY OF NATIVE HEART WITHOUT ANGINA PECTORIS: ICD-10-CM

## 2023-09-22 DIAGNOSIS — R17 ELEVATED BILIRUBIN: ICD-10-CM

## 2023-09-22 DIAGNOSIS — E66.01 MORBID OBESITY (HCC): ICD-10-CM

## 2023-09-22 DIAGNOSIS — I10 BENIGN ESSENTIAL HYPERTENSION: ICD-10-CM

## 2023-09-22 LAB
ALBUMIN SERPL BCP-MCNC: 3.8 G/DL (ref 3.5–5)
ALP SERPL-CCNC: 68 U/L (ref 34–104)
ALT SERPL W P-5'-P-CCNC: 27 U/L (ref 7–52)
ANION GAP SERPL CALCULATED.3IONS-SCNC: 5 MMOL/L
AST SERPL W P-5'-P-CCNC: 29 U/L (ref 13–39)
BASOPHILS # BLD AUTO: 0.05 THOUSANDS/ÂΜL (ref 0–0.1)
BASOPHILS NFR BLD AUTO: 1 % (ref 0–1)
BILIRUB SERPL-MCNC: 0.95 MG/DL (ref 0.2–1)
BUN SERPL-MCNC: 22 MG/DL (ref 5–25)
CALCIUM SERPL-MCNC: 9 MG/DL (ref 8.4–10.2)
CHLORIDE SERPL-SCNC: 102 MMOL/L (ref 96–108)
CHOLEST SERPL-MCNC: 103 MG/DL
CO2 SERPL-SCNC: 34 MMOL/L (ref 21–32)
CREAT SERPL-MCNC: 1.06 MG/DL (ref 0.6–1.3)
EOSINOPHIL # BLD AUTO: 0.35 THOUSAND/ÂΜL (ref 0–0.61)
EOSINOPHIL NFR BLD AUTO: 3 % (ref 0–6)
ERYTHROCYTE [DISTWIDTH] IN BLOOD BY AUTOMATED COUNT: 12.5 % (ref 11.6–15.1)
GFR SERPL CREATININE-BSD FRML MDRD: 68 ML/MIN/1.73SQ M
GLUCOSE P FAST SERPL-MCNC: 84 MG/DL (ref 65–99)
HCT VFR BLD AUTO: 47.2 % (ref 36.5–49.3)
HDLC SERPL-MCNC: 38 MG/DL
HGB BLD-MCNC: 14.5 G/DL (ref 12–17)
IMM GRANULOCYTES # BLD AUTO: 0.04 THOUSAND/UL (ref 0–0.2)
IMM GRANULOCYTES NFR BLD AUTO: 0 % (ref 0–2)
LDLC SERPL CALC-MCNC: 49 MG/DL (ref 0–100)
LYMPHOCYTES # BLD AUTO: 2.28 THOUSANDS/ÂΜL (ref 0.6–4.47)
LYMPHOCYTES NFR BLD AUTO: 21 % (ref 14–44)
MCH RBC QN AUTO: 31.5 PG (ref 26.8–34.3)
MCHC RBC AUTO-ENTMCNC: 30.7 G/DL (ref 31.4–37.4)
MCV RBC AUTO: 102 FL (ref 82–98)
MONOCYTES # BLD AUTO: 0.87 THOUSAND/ÂΜL (ref 0.17–1.22)
MONOCYTES NFR BLD AUTO: 8 % (ref 4–12)
NEUTROPHILS # BLD AUTO: 7.23 THOUSANDS/ÂΜL (ref 1.85–7.62)
NEUTS SEG NFR BLD AUTO: 67 % (ref 43–75)
NRBC BLD AUTO-RTO: 0 /100 WBCS
PLATELET # BLD AUTO: 219 THOUSANDS/UL (ref 149–390)
PMV BLD AUTO: 9.6 FL (ref 8.9–12.7)
POTASSIUM SERPL-SCNC: 5.2 MMOL/L (ref 3.5–5.3)
PROT SERPL-MCNC: 6.6 G/DL (ref 6.4–8.4)
RBC # BLD AUTO: 4.61 MILLION/UL (ref 3.88–5.62)
SODIUM SERPL-SCNC: 141 MMOL/L (ref 135–147)
TRIGL SERPL-MCNC: 80 MG/DL
WBC # BLD AUTO: 10.82 THOUSAND/UL (ref 4.31–10.16)

## 2023-09-22 PROCEDURE — 36415 COLL VENOUS BLD VENIPUNCTURE: CPT

## 2023-09-22 PROCEDURE — 80061 LIPID PANEL: CPT

## 2023-09-22 PROCEDURE — 80053 COMPREHEN METABOLIC PANEL: CPT

## 2023-09-22 PROCEDURE — 85025 COMPLETE CBC W/AUTO DIFF WBC: CPT

## 2023-09-25 DIAGNOSIS — D72.829 LEUKOCYTOSIS, UNSPECIFIED TYPE: Primary | ICD-10-CM

## 2023-09-25 DIAGNOSIS — N18.2 STAGE 2 CHRONIC KIDNEY DISEASE: ICD-10-CM

## 2023-10-02 ENCOUNTER — TELEPHONE (OUTPATIENT)
Dept: FAMILY MEDICINE CLINIC | Facility: CLINIC | Age: 73
End: 2023-10-02

## 2023-10-02 NOTE — TELEPHONE ENCOUNTER
Patient returned call, relayed message. Patient states he did not see his results on MyChart. He will repeat labs in 2 weeks.

## 2023-10-02 NOTE — TELEPHONE ENCOUNTER
----- Message from [address] [place] [address] sent at 9/25/2023  1:32 PM EDT -----  Please let the patient know that his CO2 was slightly elevated and kidney function was alittle decreased. Please tell patient to make sure that he is drinking enough fluids. Please let the patient know that his WBC was slightly elevated also. Repeat CMP and CBC in 2 weeks. Orders in the computer.

## 2023-10-11 DIAGNOSIS — I25.10 CAD (CORONARY ARTERY DISEASE): ICD-10-CM

## 2023-10-11 RX ORDER — ATORVASTATIN CALCIUM 40 MG/1
40 TABLET, FILM COATED ORAL DAILY
Qty: 90 TABLET | Refills: 3 | Status: SHIPPED | OUTPATIENT
Start: 2023-10-11

## 2023-10-17 ENCOUNTER — APPOINTMENT (OUTPATIENT)
Dept: LAB | Facility: CLINIC | Age: 73
End: 2023-10-17
Payer: MEDICARE

## 2023-10-17 DIAGNOSIS — D72.829 LEUKOCYTOSIS, UNSPECIFIED TYPE: ICD-10-CM

## 2023-10-17 DIAGNOSIS — N18.2 STAGE 2 CHRONIC KIDNEY DISEASE: ICD-10-CM

## 2023-10-17 LAB
ALBUMIN SERPL BCP-MCNC: 3.8 G/DL (ref 3.5–5)
ALP SERPL-CCNC: 69 U/L (ref 34–104)
ALT SERPL W P-5'-P-CCNC: 20 U/L (ref 7–52)
ANION GAP SERPL CALCULATED.3IONS-SCNC: 5 MMOL/L
AST SERPL W P-5'-P-CCNC: 23 U/L (ref 13–39)
BASOPHILS # BLD AUTO: 0.04 THOUSANDS/ÂΜL (ref 0–0.1)
BASOPHILS NFR BLD AUTO: 0 % (ref 0–1)
BILIRUB SERPL-MCNC: 1.35 MG/DL (ref 0.2–1)
BUN SERPL-MCNC: 19 MG/DL (ref 5–25)
CALCIUM SERPL-MCNC: 9.3 MG/DL (ref 8.4–10.2)
CHLORIDE SERPL-SCNC: 102 MMOL/L (ref 96–108)
CO2 SERPL-SCNC: 34 MMOL/L (ref 21–32)
CREAT SERPL-MCNC: 0.84 MG/DL (ref 0.6–1.3)
EOSINOPHIL # BLD AUTO: 0.3 THOUSAND/ÂΜL (ref 0–0.61)
EOSINOPHIL NFR BLD AUTO: 3 % (ref 0–6)
ERYTHROCYTE [DISTWIDTH] IN BLOOD BY AUTOMATED COUNT: 12.5 % (ref 11.6–15.1)
GFR SERPL CREATININE-BSD FRML MDRD: 85 ML/MIN/1.73SQ M
GLUCOSE P FAST SERPL-MCNC: 78 MG/DL (ref 65–99)
HCT VFR BLD AUTO: 47.5 % (ref 36.5–49.3)
HGB BLD-MCNC: 15.4 G/DL (ref 12–17)
IMM GRANULOCYTES # BLD AUTO: 0.04 THOUSAND/UL (ref 0–0.2)
IMM GRANULOCYTES NFR BLD AUTO: 0 % (ref 0–2)
LYMPHOCYTES # BLD AUTO: 2.22 THOUSANDS/ÂΜL (ref 0.6–4.47)
LYMPHOCYTES NFR BLD AUTO: 20 % (ref 14–44)
MCH RBC QN AUTO: 32.2 PG (ref 26.8–34.3)
MCHC RBC AUTO-ENTMCNC: 32.4 G/DL (ref 31.4–37.4)
MCV RBC AUTO: 99 FL (ref 82–98)
MONOCYTES # BLD AUTO: 0.9 THOUSAND/ÂΜL (ref 0.17–1.22)
MONOCYTES NFR BLD AUTO: 8 % (ref 4–12)
NEUTROPHILS # BLD AUTO: 7.36 THOUSANDS/ÂΜL (ref 1.85–7.62)
NEUTS SEG NFR BLD AUTO: 69 % (ref 43–75)
NRBC BLD AUTO-RTO: 0 /100 WBCS
PLATELET # BLD AUTO: 225 THOUSANDS/UL (ref 149–390)
PMV BLD AUTO: 9.7 FL (ref 8.9–12.7)
POTASSIUM SERPL-SCNC: 5.1 MMOL/L (ref 3.5–5.3)
PROT SERPL-MCNC: 6.7 G/DL (ref 6.4–8.4)
RBC # BLD AUTO: 4.79 MILLION/UL (ref 3.88–5.62)
SODIUM SERPL-SCNC: 141 MMOL/L (ref 135–147)
WBC # BLD AUTO: 10.86 THOUSAND/UL (ref 4.31–10.16)

## 2023-10-17 PROCEDURE — 80053 COMPREHEN METABOLIC PANEL: CPT

## 2023-10-17 PROCEDURE — 85025 COMPLETE CBC W/AUTO DIFF WBC: CPT

## 2023-10-17 PROCEDURE — 36415 COLL VENOUS BLD VENIPUNCTURE: CPT

## 2023-10-19 ENCOUNTER — TELEPHONE (OUTPATIENT)
Dept: FAMILY MEDICINE CLINIC | Facility: CLINIC | Age: 73
End: 2023-10-19

## 2023-10-19 NOTE — TELEPHONE ENCOUNTER
----- Message from Rashad Ramirez, [facility] sent at 10/18/2023 11:49 AM EDT -----  Please let the patient know that his bilirubin is still elevated. I do recommend that he follow-up with gastroenterology again. Patient's WBC is slightly elevated again. He can follow-up with his hematologist again for further monitoring.

## 2023-10-19 NOTE — TELEPHONE ENCOUNTER
FYI: As per previous message patient was transferred to the [facility] office and Mani Jerome kindly assisted patient.

## 2023-11-01 ENCOUNTER — OFFICE VISIT (OUTPATIENT)
Dept: CARDIOLOGY CLINIC | Facility: CLINIC | Age: 73
End: 2023-11-01
Payer: MEDICARE

## 2023-11-01 VITALS
HEART RATE: 71 BPM | DIASTOLIC BLOOD PRESSURE: 78 MMHG | SYSTOLIC BLOOD PRESSURE: 134 MMHG | OXYGEN SATURATION: 95 % | BODY MASS INDEX: 39.37 KG/M2 | WEIGHT: 275 LBS | HEIGHT: 70 IN

## 2023-11-01 DIAGNOSIS — I49.3 PVC'S (PREMATURE VENTRICULAR CONTRACTIONS): Primary | ICD-10-CM

## 2023-11-01 DIAGNOSIS — I25.10 CORONARY ARTERY DISEASE INVOLVING NATIVE CORONARY ARTERY OF NATIVE HEART WITHOUT ANGINA PECTORIS: ICD-10-CM

## 2023-11-01 PROCEDURE — 93000 ELECTROCARDIOGRAM COMPLETE: CPT | Performed by: INTERNAL MEDICINE

## 2023-11-01 PROCEDURE — 99214 OFFICE O/P EST MOD 30 MIN: CPT | Performed by: INTERNAL MEDICINE

## 2023-11-01 NOTE — PROGRESS NOTES
Cardiology Follow Up    Omaira Nathan  1950  [id number]  [facility]  [address] [place] [address] [address]  [address]  [facility] [id number] [phone]    1. PVC's (premature ventricular contractions)  POCT ECG      2. Coronary artery disease involving native coronary artery of native heart without angina pectoris            Interval History: Followup for PVCs    Doing well. No chest pain, dyspnea or palpitations. Medical Problems       Problem List       Chronic pain syndrome    Chronic bilateral low back pain without sciatica    Chronic back pain    Weakness    Coronary artery disease    Benign essential hypertension    PVC's (premature ventricular contractions)    Irregular heart rhythm    Obesity (BMI 30-39. 9)    Medicare annual wellness visit, initial    Elevated bilirubin    Adrenal nodule ([address])    Encounter for prostate cancer screening    Screening for colon cancer    Lumbar disc herniation with radiculopathy    Degenerative lumbar spinal stenosis    Adolescent idiopathic scoliosis of lumbar region    Morbid obesity ([address])    Allergic rhinitis        Past Medical History:   Diagnosis Date    Arthritis     Cancer ([address])     Coronary artery disease     Coronary artery disease involving native coronary artery of native heart without angina pectoris     Edema, lower extremity     Last assessed 1/12/2015     Hyperlipidemia     Hypertension     Leukocytosis 11/06/2019    Melanoma ([address])     back, removed    Stented coronary artery      Social History     Socioeconomic History    Marital status: Single     Spouse name: Not on file    Number of children: Not on file    Years of education: Not on file    Highest education level: Not on file   Occupational History    Not on file   Tobacco Use    Smoking status: Former     Packs/day: 0.50     Years: 25.00     Total pack years: 12.50     Types: Cigarettes     Start date: 54     Quit date: 1985     Years since quittin.8    Smokeless tobacco: Former   Vaping Use    Vaping Use: Never used   Substance and Sexual Activity    Alcohol use: Not Currently    Drug use: No    Sexual activity: Not Currently   Other Topics Concern    Not on file   Social History Narrative    History of never a smoker, resolved 2015 - As per [employer]      Social Determinants of Health     Financial Resource Strain: Low Risk  (2023)    Overall Financial Resource Strain (CARDIA)     Difficulty of Paying Living Expenses: Not very hard   Food Insecurity: Not on file   Transportation Needs: No Transportation Needs (2023)    PRAPARE - Transportation     Lack of Transportation (Medical): No     Lack of Transportation (Non-Medical): No   Physical Activity: Not on file   Stress: Not on file   Social Connections: Not on file   Intimate Partner Violence: Not on file   Housing Stability: Not on file      Family History   Problem Relation Age of Onset    ALS Mother     Prostate cancer Father     Pancreatic cancer Father     Breast cancer Sister     Arrhythmia Neg Hx     Clotting disorder Neg Hx     Fainting Neg Hx     Heart attack Neg Hx     Heart disease Neg Hx     Hypertension Neg Hx     Hyperlipidemia Neg Hx     Anuerysm Neg Hx     Stroke Neg Hx      Past Surgical History:   Procedure Laterality Date    BILATERAL VATS ABLATION      CAROTID STENT      Transcath placement of intrathoracic carotid artery stent     MALIGNANT SKIN LESION EXCISION      Trunk     NERVE BLOCK Bilateral 2018    Procedure: L3 L4 L5 S1 Medical Branch Block #2 ([id number]); Surgeon: New Ledesma MD;  Location: [facility] MAIN OR;  Service: Pain Management     NERVE BLOCK Bilateral 2018    Procedure: L3 L4 L5 S1 Medial Branch Block #1 ([id number]);   Surgeon: New Ledesma MD;  Location: [facility] MAIN OR;  Service: Pain Management     RADIOFREQUENCY ABLATION Right 2019    Procedure: L3 L4 L5 S1 Radio Frequency Ablation;  Surgeon: Leonid Stanton Karen Copeland MD;  Location: [address] [place] [address] MAIN OR;  Service: Pain Management     RADIOFREQUENCY ABLATION Left 1/18/2019    Procedure: L3 L4 L5 S1 Radio Frequency Ablation;  Surgeon: Jc Delgado MD;  Location: [facility] MAIN OR;  Service: Pain Management     SENTINEL LYMPH NODE BIOPSY         Current Outpatient Medications:     aspirin 1 mg/mL SUSP, Take 162 mg by mouth., Disp: , Rfl:     atorvastatin (LIPITOR) 40 mg tablet, TAKE 1 TABLET BY MOUTH  DAILY, Disp: 90 tablet, Rfl: 3    fluticasone (FLONASE) 50 mcg/act nasal spray, 1 spray into each nostril 2 (two) times a day, Disp: 16 mL, Rfl: 1    metoprolol succinate (TOPROL-XL) 50 mg 24 hr tablet, TAKE 1 TABLET BY MOUTH  TWICE DAILY, Disp: 180 tablet, Rfl: 3    nitroglycerin (NITROSTAT) 0.4 mg SL tablet, Place 1 tablet under the tongue, Disp: , Rfl:   No Known Allergies    Labs:     Chemistry        Component Value Date/Time     03/07/2015 1123    K 5.1 10/17/2023 1145    K 4.6 03/07/2015 1123     10/17/2023 1145     03/07/2015 1123    CO2 34 (H) 10/17/2023 1145    CO2 31 03/07/2015 1123    BUN 19 10/17/2023 1145    BUN 22 03/07/2015 1123    CREATININE 0.84 10/17/2023 1145    CREATININE 0.92 03/07/2015 1123        Component Value Date/Time    CALCIUM 9.3 10/17/2023 1145    CALCIUM 9.5 03/07/2015 1123    ALKPHOS 69 10/17/2023 1145    ALKPHOS 81 07/13/2015 1108    AST 23 10/17/2023 1145    AST 34 07/13/2015 1108    ALT 20 10/17/2023 1145    ALT 14 07/13/2015 1108    BILITOT 1.3 (H) 07/13/2015 1108            Lab Results   Component Value Date    CHOL 107 07/13/2015    CHOL 105 02/05/2015     Lab Results   Component Value Date    HDL 38 (L) 09/22/2023    HDL 46 02/07/2023    HDL 41 02/26/2019     Lab Results   Component Value Date    LDLCALC 49 09/22/2023    LDLCALC 61 02/07/2023    LDLCALC 57 02/26/2019     Lab Results   Component Value Date    TRIG 80 09/22/2023    TRIG 91 02/07/2023    TRIG 85 02/26/2019     No results found for: "CHOLHDL"    Imaging: No results found. EKG: NSR Normal ECG. Review of Systems   Constitutional: Negative. HENT: Negative. Eyes: Negative. Cardiovascular: Negative. Respiratory: Negative. Endocrine: Negative. Hematologic/Lymphatic: Negative. Skin: Negative. Musculoskeletal: Negative. Gastrointestinal: Negative. Genitourinary: Negative. Neurological: Negative. Psychiatric/Behavioral: Negative. Allergic/Immunologic: Negative. Vitals:    11/01/23 1301   BP: 134/78   Pulse: 71   SpO2: 95%           Physical Exam  Vitals reviewed. Constitutional:       Appearance: Normal appearance. HENT:      Head: Normocephalic. Nose: Nose normal.      Mouth/Throat:      Mouth: Mucous membranes are moist.      Pharynx: Oropharynx is clear. Eyes:      General: No scleral icterus. Conjunctiva/sclera: Conjunctivae normal.   Cardiovascular:      Rate and Rhythm: Normal rate and regular rhythm. Heart sounds: No murmur heard. No friction rub. No gallop. Pulmonary:      Effort: Pulmonary effort is normal. No respiratory distress. Breath sounds: Normal breath sounds. No wheezing or rales. Abdominal:      General: Abdomen is flat. Bowel sounds are normal. There is no distension. Palpations: Abdomen is soft. Tenderness: There is no abdominal tenderness. There is no guarding. Musculoskeletal:      Cervical back: Normal range of motion and neck supple. Right lower leg: No edema. Left lower leg: No edema. Skin:     General: Skin is warm and dry. Neurological:      General: No focal deficit present. Mental Status: He is alert and oriented to person, place, and time. Psychiatric:         Mood and Affect: Mood normal.         Behavior: Behavior normal.         Discussion/Summary:    1. Coronary Artery Disease: Hx of YOGESH to OM and RPDA. Overall stable. Continue medical therapy. LDL was 49. He has no angina. 2. HTN: BP is stable on medical therapy. 3. PVCs Continue with  Metoprolol. Asymptomatic. EF normal on last echocardiogram.       The patient was counseled regarding diagnostic results, instructions for management, risk factor reductions, impressions. total time of encounter was 25 minutes and 15 minutes was spent counseling.

## 2024-02-21 PROBLEM — Z12.5 ENCOUNTER FOR PROSTATE CANCER SCREENING: Status: RESOLVED | Noted: 2019-12-11 | Resolved: 2024-02-21

## 2024-02-21 PROBLEM — Z12.11 SCREENING FOR COLON CANCER: Status: RESOLVED | Noted: 2019-12-11 | Resolved: 2024-02-21

## 2024-02-21 PROBLEM — Z00.00 MEDICARE ANNUAL WELLNESS VISIT, INITIAL: Status: RESOLVED | Noted: 2019-12-11 | Resolved: 2024-02-21

## 2024-03-25 ENCOUNTER — OFFICE VISIT (OUTPATIENT)
Dept: FAMILY MEDICINE CLINIC | Facility: CLINIC | Age: 74
End: 2024-03-25
Payer: MEDICARE

## 2024-03-25 VITALS
HEART RATE: 94 BPM | WEIGHT: 267 LBS | HEIGHT: 70 IN | OXYGEN SATURATION: 95 % | BODY MASS INDEX: 38.22 KG/M2 | RESPIRATION RATE: 18 BRPM | SYSTOLIC BLOOD PRESSURE: 125 MMHG | DIASTOLIC BLOOD PRESSURE: 70 MMHG

## 2024-03-25 DIAGNOSIS — J01.90 ACUTE SINUSITIS, RECURRENCE NOT SPECIFIED, UNSPECIFIED LOCATION: Primary | ICD-10-CM

## 2024-03-25 DIAGNOSIS — E27.8 ADRENAL NODULE (HCC): ICD-10-CM

## 2024-03-25 DIAGNOSIS — R17 ELEVATED BILIRUBIN: ICD-10-CM

## 2024-03-25 DIAGNOSIS — Z00.00 MEDICARE ANNUAL WELLNESS VISIT, SUBSEQUENT: ICD-10-CM

## 2024-03-25 DIAGNOSIS — I10 BENIGN ESSENTIAL HYPERTENSION: ICD-10-CM

## 2024-03-25 DIAGNOSIS — E66.01 MORBID OBESITY (HCC): ICD-10-CM

## 2024-03-25 PROBLEM — G89.29 CHRONIC BACK PAIN: Status: RESOLVED | Noted: 2018-12-17 | Resolved: 2024-03-25

## 2024-03-25 PROBLEM — R53.1 WEAKNESS: Status: RESOLVED | Noted: 2019-11-06 | Resolved: 2024-03-25

## 2024-03-25 PROBLEM — M54.9 CHRONIC BACK PAIN: Status: RESOLVED | Noted: 2018-12-17 | Resolved: 2024-03-25

## 2024-03-25 PROCEDURE — G0439 PPPS, SUBSEQ VISIT: HCPCS | Performed by: NURSE PRACTITIONER

## 2024-03-25 PROCEDURE — 99214 OFFICE O/P EST MOD 30 MIN: CPT | Performed by: NURSE PRACTITIONER

## 2024-03-25 RX ORDER — AMOXICILLIN AND CLAVULANATE POTASSIUM 875; 125 MG/1; MG/1
1 TABLET, FILM COATED ORAL EVERY 12 HOURS SCHEDULED
Qty: 14 TABLET | Refills: 0 | Status: SHIPPED | OUTPATIENT
Start: 2024-03-25 | End: 2024-04-01

## 2024-03-25 NOTE — PATIENT INSTRUCTIONS
Medicare Preventive Visit Patient Instructions  Thank you for completing your Welcome to Medicare Visit or Medicare Annual Wellness Visit today. Your next wellness visit will be due in one year (3/26/2025).  The screening/preventive services that you may require over the next 5-10 years are detailed below. Some tests may not apply to you based off risk factors and/or age. Screening tests ordered at today's visit but not completed yet may show as past due. Also, please note that scanned in results may not display below.  Preventive Screenings:  Service Recommendations Previous Testing/Comments   Colorectal Cancer Screening  Colonoscopy    Fecal Occult Blood Test (FOBT)/Fecal Immunochemical Test (FIT)  Fecal DNA/Cologuard Test  Flexible Sigmoidoscopy Age: 45-75 years old   Colonoscopy: every 10 years (May be performed more frequently if at higher risk)  OR  FOBT/FIT: every 1 year  OR  Cologuard: every 3 years  OR  Sigmoidoscopy: every 5 years  Screening may be recommended earlier than age 45 if at higher risk for colorectal cancer. Also, an individualized decision between you and your healthcare provider will decide whether screening between the ages of 76-85 would be appropriate. Colonoscopy: Not on file  FOBT/FIT: Not on file  Cologuard: 02/14/2023  Sigmoidoscopy: Not on file          Prostate Cancer Screening Individualized decision between patient and health care provider in men between ages of 55-69   Medicare will cover every 12 months beginning on the day after your 50th birthday PSA: 1.0 ng/mL     Screening Not Indicated     Hepatitis C Screening Once for adults born between 1945 and 1965  More frequently in patients at high risk for Hepatitis C Hep C Antibody: Not on file        Diabetes Screening 1-2 times per year if you're at risk for diabetes or have pre-diabetes Fasting glucose: 78 mg/dL (10/17/2023)  A1C: No results in last 5 years (No results in last 5 years)  Screening Current   Cholesterol Screening  Once every 5 years if you don't have a lipid disorder. May order more often based on risk factors. Lipid panel: 09/22/2023  Screening Current      Other Preventive Screenings Covered by Medicare:  Abdominal Aortic Aneurysm (AAA) Screening: covered once if your at risk. You're considered to be at risk if you have a family history of AAA or a male between the age of 65-75 who smoking at least 100 cigarettes in your lifetime.  Lung Cancer Screening: covers low dose CT scan once per year if you meet all of the following conditions: (1) Age 55-77; (2) No signs or symptoms of lung cancer; (3) Current smoker or have quit smoking within the last 15 years; (4) You have a tobacco smoking history of at least 20 pack years (packs per day x number of years you smoked); (5) You get a written order from a healthcare provider.  Glaucoma Screening: covered annually if you're considered high risk: (1) You have diabetes OR (2) Family history of glaucoma OR (3)  aged 50 and older OR (4)  American aged 65 and older  Osteoporosis Screening: covered every 2 years if you meet one of the following conditions: (1) Have a vertebral abnormality; (2) On glucocorticoid therapy for more than 3 months; (3) Have primary hyperparathyroidism; (4) On osteoporosis medications and need to assess response to drug therapy.  HIV Screening: covered annually if you're between the age of 15-65. Also covered annually if you are younger than 15 and older than 65 with risk factors for HIV infection. For pregnant patients, it is covered up to 3 times per pregnancy.    Immunizations:  Immunization Recommendations   Influenza Vaccine Annual influenza vaccination during flu season is recommended for all persons aged >= 6 months who do not have contraindications   Pneumococcal Vaccine   * Pneumococcal conjugate vaccine = PCV13 (Prevnar 13), PCV15 (Vaxneuvance), PCV20 (Prevnar 20)  * Pneumococcal polysaccharide vaccine = PPSV23 (Pneumovax)  Adults 19-65 yo with certain risk factors or if 65+ yo  If never received any pneumonia vaccine: recommend Prevnar 20 (PCV20)  Give PCV20 if previously received 1 dose of PCV13 or PPSV23   Hepatitis B Vaccine 3 dose series if at intermediate or high risk (ex: diabetes, end stage renal disease, liver disease)   Respiratory syncytial virus (RSV) Vaccine - COVERED BY MEDICARE PART D  * RSVPreF3 (Arexvy) CDC recommends that adults 60 years of age and older may receive a single dose of RSV vaccine using shared clinical decision-making (SCDM)   Tetanus (Td) Vaccine - COST NOT COVERED BY MEDICARE PART B Following completion of primary series, a booster dose should be given every 10 years to maintain immunity against tetanus. Td may also be given as tetanus wound prophylaxis.   Tdap Vaccine - COST NOT COVERED BY MEDICARE PART B Recommended at least once for all adults. For pregnant patients, recommended with each pregnancy.   Shingles Vaccine (Shingrix) - COST NOT COVERED BY MEDICARE PART B  2 shot series recommended in those 19 years and older who have or will have weakened immune systems or those 50 years and older     Health Maintenance Due:      Topic Date Due   • Hepatitis C Screening  Never done   • Colorectal Cancer Screening  02/14/2026     Immunizations Due:      Topic Date Due   • COVID-19 Vaccine (7 - 2023-24 season) 09/01/2023     Advance Directives   What are advance directives?  Advance directives are legal documents that state your wishes and plans for medical care. These plans are made ahead of time in case you lose your ability to make decisions for yourself. Advance directives can apply to any medical decision, such as the treatments you want, and if you want to donate organs.   What are the types of advance directives?  There are many types of advance directives, and each state has rules about how to use them. You may choose a combination of any of the following:  Living will:  This is a written record of  the treatment you want. You can also choose which treatments you do not want, which to limit, and which to stop at a certain time. This includes surgery, medicine, IV fluid, and tube feedings.   Durable power of  for healthcare (DPAHC):  This is a written record that states who you want to make healthcare choices for you when you are unable to make them for yourself. This person, called a proxy, is usually a family member or a friend. You may choose more than 1 proxy.  Do not resuscitate (DNR) order:  A DNR order is used in case your heart stops beating or you stop breathing. It is a request not to have certain forms of treatment, such as CPR. A DNR order may be included in other types of advance directives.  Medical directive:  This covers the care that you want if you are in a coma, near death, or unable to make decisions for yourself. You can list the treatments you want for each condition. Treatment may include pain medicine, surgery, blood transfusions, dialysis, IV or tube feedings, and a ventilator (breathing machine).  Values history:  This document has questions about your views, beliefs, and how you feel and think about life. This information can help others choose the care that you would choose.  Why are advance directives important?  An advance directive helps you control your care. Although spoken wishes may be used, it is better to have your wishes written down. Spoken wishes can be misunderstood, or not followed. Treatments may be given even if you do not want them. An advance directive may make it easier for your family to make difficult choices about your care.   Weight Management   Why it is important to manage your weight:  Being overweight increases your risk of health conditions such as heart disease, high blood pressure, type 2 diabetes, and certain types of cancer. It can also increase your risk for osteoarthritis, sleep apnea, and other respiratory problems. Aim for a slow, steady  weight loss. Even a small amount of weight loss can lower your risk of health problems.  How to lose weight safely:  A safe and healthy way to lose weight is to eat fewer calories and get regular exercise. You can lose up about 1 pound a week by decreasing the number of calories you eat by 500 calories each day.   Healthy meal plan for weight management:  A healthy meal plan includes a variety of foods, contains fewer calories, and helps you stay healthy. A healthy meal plan includes the following:  Eat whole-grain foods more often.  A healthy meal plan should contain fiber. Fiber is the part of grains, fruits, and vegetables that is not broken down by your body. Whole-grain foods are healthy and provide extra fiber in your diet. Some examples of whole-grain foods are whole-wheat breads and pastas, oatmeal, brown rice, and bulgur.  Eat a variety of vegetables every day.  Include dark, leafy greens such as spinach, kale, miles greens, and mustard greens. Eat yellow and orange vegetables such as carrots, sweet potatoes, and winter squash.   Eat a variety of fruits every day.  Choose fresh or canned fruit (canned in its own juice or light syrup) instead of juice. Fruit juice has very little or no fiber.  Eat low-fat dairy foods.  Drink fat-free (skim) milk or 1% milk. Eat fat-free yogurt and low-fat cottage cheese. Try low-fat cheeses such as mozzarella and other reduced-fat cheeses.  Choose meat and other protein foods that are low in fat.  Choose beans or other legumes such as split peas or lentils. Choose fish, skinless poultry (chicken or turkey), or lean cuts of red meat (beef or pork). Before you cook meat or poultry, cut off any visible fat.   Use less fat and oil.  Try baking foods instead of frying them. Add less fat, such as margarine, sour cream, regular salad dressing and mayonnaise to foods. Eat fewer high-fat foods. Some examples of high-fat foods include french fries, doughnuts, ice cream, and  cakes.  Eat fewer sweets.  Limit foods and drinks that are high in sugar. This includes candy, cookies, regular soda, and sweetened drinks.  Exercise:  Exercise at least 30 minutes per day on most days of the week. Some examples of exercise include walking, biking, dancing, and swimming. You can also fit in more physical activity by taking the stairs instead of the elevator or parking farther away from stores. Ask your healthcare provider about the best exercise plan for you.      © Copyright [employer] 2018 Information is for End User's use only and may not be sold, redistributed or otherwise used for commercial purposes. All illustrations and images included in CareNotes® are the copyrighted property of [employer] or [employer]

## 2024-03-25 NOTE — PROGRESS NOTES
Assessment and Plan:     Problem List Items Addressed This Visit          Cardiovascular and Mediastinum    Benign essential hypertension    Relevant Orders    Comprehensive metabolic panel    CBC and differential    BP stable.   Continue to follow-up with cardiology.        Respiratory    Acute sinusitis - Primary    Relevant Medications    amoxicillin-clavulanate (AUGMENTIN) 875-125 mg per tablet    Patient reports nasal congestion, sinus pressure, and cough for the past week.   Denies any fever, wheezing, or SOB.   Denies any earache or vomiting.   Nasal congestion noted.   Augmentin prescribed for acute sinusitis. Medication information and side effects reviewed.   Patient instructed to follow-up if symptoms get worse or do not get better.          Other    Elevated bilirubin    Relevant Orders    Comprehensive metabolic panel    CMP ordered.       Adrenal nodule (HCC)  Patient had imagining done in the past and it was found to be benign.       Morbid obesity (HCC)  Patient encouraged to eat a healthy low fat diet.       Medicare annual wellness visit, subsequent  Patient encouraged to eat a healthy low fat diet.     Patient instructed to follow-up in 6 months or sooner prn.        Depression Screening and Follow-up Plan: Patient was screened for depression during today's encounter. They screened negative with a PHQ-2 score of 0.      Preventive health issues were discussed with patient, and age appropriate screening tests were ordered as noted in patient's After Visit Summary.  Personalized health advice and appropriate referrals for health education or preventive services given if needed, as noted in patient's After Visit Summary.     History of Present Illness:     Patient presents for a Medicare Wellness Visit    Patient is here for a follow-up for chronic medical conditions.   Patient follows up with cardiology for CAD and HTN.   Denies any chest pain, SOB, or palpitations.     Patient reports nasal  congestion, sinus pressure, and cough for the past week.   Patient also reports a sore throat.   Denies any wheezing or SOB.   Denies any fever, body aches, earache, or vomiting.   Patient reports that his symptoms are not going away.        Patient Care Team:  ANTONIO Pollard as PCP - General (Family Medicine)  MD Carlos Cedillo MD Francis Burt, MD     Review of Systems:     Review of Systems   Constitutional:  Negative for chills and fever.   HENT:          As noted in HPI.    Respiratory:  Positive for cough. Negative for shortness of breath and wheezing.    Cardiovascular:  Negative for chest pain.   Gastrointestinal:  Negative for abdominal pain, blood in stool, diarrhea and vomiting.   Genitourinary:  Negative for dysuria and hematuria.   Skin:  Negative for rash.   Neurological:  Negative for dizziness, light-headedness and headaches.        Problem List:     Patient Active Problem List   Diagnosis    Chronic pain syndrome    Chronic bilateral low back pain without sciatica    Coronary artery disease    Benign essential hypertension    PVC's (premature ventricular contractions)    Irregular heart rhythm    Obesity (BMI 30-39.9)    Elevated bilirubin    Adrenal nodule (HCC)    Lumbar disc herniation with radiculopathy    Degenerative lumbar spinal stenosis    Adolescent idiopathic scoliosis of lumbar region    Morbid obesity (HCC)    Allergic rhinitis    Medicare annual wellness visit, subsequent    Acute sinusitis      Past Medical and Surgical History:     Past Medical History:   Diagnosis Date    Arthritis     Cancer (HCC)     Coronary artery disease     Coronary artery disease involving native coronary artery of native heart without angina pectoris     Edema, lower extremity     Last assessed 1/12/2015     Hyperlipidemia     Hypertension     Leukocytosis 11/06/2019    Melanoma (HCC)     back, removed    Stented coronary artery      Past Surgical History:   Procedure Laterality  Date    BILATERAL VATS ABLATION      CAROTID STENT      Transcath placement of intrathoracic carotid artery stent     MALIGNANT SKIN LESION EXCISION      Trunk     NERVE BLOCK Bilateral 2018    Procedure: L3 L4 L5 S1 Medical Branch Block #2 ([id number]);  Surgeon: Fernando Mondragon MD;  Location: [facility] MAIN OR;  Service: Pain Management     NERVE BLOCK Bilateral 2018    Procedure: L3 L4 L5 S1 Medial Branch Block #1 ([id number]);  Surgeon: Fernando Mondragon MD;  Location: [facility] MAIN OR;  Service: Pain Management     RADIOFREQUENCY ABLATION Right 2019    Procedure: L3 L4 L5 S1 Radio Frequency Ablation;  Surgeon: Fernando Mondragon MD;  Location: [facility] MAIN OR;  Service: Pain Management     RADIOFREQUENCY ABLATION Left 2019    Procedure: L3 L4 L5 S1 Radio Frequency Ablation;  Surgeon: Fernando Mondragon MD;  Location: [facility] MAIN OR;  Service: Pain Management     SENTINEL LYMPH NODE BIOPSY        Family History:     Family History   Problem Relation Age of Onset    ALS Mother     Prostate cancer Father     Pancreatic cancer Father     Breast cancer Sister     Arrhythmia Neg Hx     Clotting disorder Neg Hx     Fainting Neg Hx     Heart attack Neg Hx     Heart disease Neg Hx     Hypertension Neg Hx     Hyperlipidemia Neg Hx     Anuerysm Neg Hx     Stroke Neg Hx       Social History:     Social History     Socioeconomic History    Marital status: Single     Spouse name: None    Number of children: None    Years of education: None    Highest education level: None   Occupational History    None   Tobacco Use    Smoking status: Former     Current packs/day: 0.00     Average packs/day: 0.5 packs/day for 25.0 years (12.5 ttl pk-yrs)     Types: Cigarettes     Start date:      Quit date:      Years since quittin.2    Smokeless tobacco: Former   Vaping Use    Vaping status: Never Used   Substance and Sexual Activity    Alcohol use: Not Currently    Drug use: No    Sexual activity: Not  Currently   Other Topics Concern    None   Social History Narrative    History of never a smoker, [facility]12[facility]      Social Determinants of Health     Financial Resource Strain: Low Risk  (2/5/2023)    Overall Financial Resource Strain (CARDIA)     Difficulty of Paying Living Expenses: Not very hard   Food Insecurity: No Food Insecurity (3/25/2024)    Hunger Vital Sign     Worried About Running Out of Food in the Last Year: Never true     Ran Out of Food in the Last Year: Never true   Transportation Needs: No Transportation Needs (3/25/2024)    PRAPARE - Transportation     Lack of Transportation (Medical): No     Lack of Transportation (Non-Medical): No   Physical Activity: Not on file   Stress: Not on file   Social Connections: Not on file   Intimate Partner Violence: Not on file   Housing Stability: Low Risk  (3/25/2024)    Housing Stability Vital Sign     Unable to Pay for Housing in the Last Year: No     Number of Places Lived in the Last Year: 1     Unstable Housing in the Last Year: No      Medications and Allergies:     Current Outpatient Medications   Medication Sig Dispense Refill    amoxicillin-clavulanate (AUGMENTIN) 875-125 mg per tablet Take 1 tablet by mouth every 12 (twelve) hours for 7 days 14 tablet 0    aspirin 1 mg/mL SUSP Take 162 mg by mouth.      atorvastatin (LIPITOR) 40 mg tablet TAKE 1 TABLET BY MOUTH  DAILY 90 tablet 3    fluticasone (FLONASE) 50 mcg/act nasal spray 1 spray into each nostril 2 (two) times a day 16 mL 1    metoprolol succinate (TOPROL-XL) 50 mg 24 hr tablet TAKE 1 TABLET BY MOUTH  TWICE DAILY 180 tablet 3    nitroglycerin (NITROSTAT) 0.4 mg SL tablet Place 1 tablet under the tongue       No current facility-administered medications for this visit.     No Known Allergies   Immunizations:     Immunization History   Administered Date(s) Administered    COVID-19 PFIZER VACCINE 0.3 ML IM 02/26/2021, 03/18/2021, 08/17/2021    COVID-19 Pfizer Vac BIVALENT  Mekhi-sucrose 12 Yr+ IM 09/13/2022, 06/05/2023    COVID-19 Pfizer vac (Mekhi-sucrose, gray cap) 12 yr+ IM 02/07/2022    INFLUENZA 09/14/2014, 09/15/2018, 09/30/2019, 10/03/2022    Influenza Split High Dose Preservative Free IM 08/30/2015, 09/24/2016, 09/30/2017    Influenza, seasonal, injectable, preservative free 11/12/2014    Pneumococcal Conjugate 13-Valent 09/26/2015    Pneumococcal Polysaccharide PPV23 09/14/2014, 11/12/2014, 10/07/2017    Zoster 11/24/2013      Health Maintenance:         Topic Date Due    Hepatitis C Screening  Never done    Colorectal Cancer Screening  02/14/2026         Topic Date Due    COVID-19 Vaccine (7 - 2023-24 season) 09/01/2023      Medicare Screening Tests and Risk Assessments:     Charles is here for his Subsequent Wellness visit.     Health Risk Assessment:   Patient rates overall health as fair. Patient feels that their physical health rating is same. Patient is satisfied with their life. Eyesight was rated as same. Hearing was rated as same. Patient feels that their emotional and mental health rating is same. Patients states they are never, rarely angry. Patient states they are sometimes unusually tired/fatigued. Pain experienced in the last 7 days has been some. Patient's pain rating has been 3/10. Patient states that he has experienced no weight loss or gain in last 6 months. Patient reports occasional pain from arthritis.     Depression Screening:   PHQ-2 Score: 0      Fall Risk Screening:   In the past year, patient has experienced: no history of falling in past year      Home Safety:  Patient does not have trouble with stairs inside or outside of their home. Patient has working smoke alarms and has working carbon monoxide detector. Home safety hazards include: none.     Nutrition:   Current diet is Unhealthy.     Medications:   Patient is currently taking over-the-counter supplements. OTC medications include: see medication list. Patient is able to manage medications.      Activities of Daily Living (ADLs)/Instrumental Activities of Daily Living (IADLs):   Walk and transfer into and out of bed and chair?: Yes  Dress and groom yourself?: Yes    Bathe or shower yourself?: Yes    Feed yourself? Yes  Do your laundry/housekeeping?: Yes  Manage your money, pay your bills and track your expenses?: Yes  Make your own meals?: Yes    Do your own shopping?: Yes    Previous Hospitalizations:   Any hospitalizations or ED visits within the last 12 months?: No      Advance Care Planning:   Living will: Yes    Durable POA for healthcare: Yes    Advanced directive: Yes    Advanced directive counseling given: Yes      Cognitive Screening:   Provider or family/friend/caregiver concerned regarding cognition?: No    PREVENTIVE SCREENINGS      Cardiovascular Screening:    General: Screening Current      Diabetes Screening:     General: Screening Current      Colorectal Cancer Screening:     General: Screening Current      Prostate Cancer Screening:    General: Risks and Benefits Discussed and Patient Declines      Osteoporosis Screening:    General: Risks and Benefits Discussed and Patient Declines      Abdominal Aortic Aneurysm (AAA) Screening:    Risk factors include: age between 65-76 yo and tobacco use        General: Risks and Benefits Discussed and Patient Declines      Lung Cancer Screening:     General: Screening Not Indicated      Hepatitis C Screening:    General: Risks and Benefits Discussed and Patient Declines    Hep C Screening Accepted: No     Screening, Brief Intervention, and Referral to Treatment (SBIRT)    Screening  Typical number of drinks in a day: 0  Typical number of drinks in a week: 0  Interpretation: Low risk drinking behavior.    AUDIT-C Screenin) How often did you have a drink containing alcohol in the past year? never  2) How many drinks did you have on a typical day when you were drinking in the past year? 0  3) How often did you have 6 or more drinks on one occasion  "in the past year? never    AUDIT-C Score: 0  Interpretation: Score 0-3 (male): Negative screen for alcohol misuse    Single Item Drug Screening:  How often have you used an illegal drug (including marijuana) or a prescription medication for non-medical reasons in the past year? never    Single Item Drug Screen Score: 0  Interpretation: Negative screen for possible drug use disorder    Brief Intervention  Alcohol & drug use screenings were reviewed. No concerns regarding substance use disorder identified.     Other Counseling Topics:   Car/seat belt/driving safety, skin self-exam, sunscreen and regular weightbearing exercise.     No results found.     Physical Exam:     /70   Pulse 94   Resp 18   Ht 5' 10\" (1.778 m)   Wt 121 kg (267 lb)   SpO2 95%   BMI 38.31 kg/m²     Physical Exam  Vitals reviewed.   Constitutional:       General: He is not in acute distress.     Appearance: He is obese. He is not diaphoretic.   HENT:      Right Ear: Tympanic membrane and ear canal normal.      Left Ear: Tympanic membrane and ear canal normal.      Nose: Congestion present.      Right Sinus: Maxillary sinus tenderness present.      Left Sinus: Maxillary sinus tenderness present.      Mouth/Throat:      Mouth: Mucous membranes are moist.      Pharynx: Oropharynx is clear. No oropharyngeal exudate or posterior oropharyngeal erythema.   Eyes:      Conjunctiva/sclera: Conjunctivae normal.      Pupils: Pupils are equal, round, and reactive to light.   Cardiovascular:      Rate and Rhythm: Normal rate and regular rhythm.      Pulses: Normal pulses.      Heart sounds: Normal heart sounds.   Pulmonary:      Effort: Pulmonary effort is normal. No respiratory distress.      Breath sounds: Normal breath sounds. No wheezing.   Skin:     Findings: No rash.   Neurological:      Mental Status: He is alert and oriented to person, place, and time.   Psychiatric:         Mood and Affect: Mood normal.          ANTONIO Pollard  "

## 2024-03-26 ENCOUNTER — TELEPHONE (OUTPATIENT)
Dept: CARDIOLOGY CLINIC | Facility: CLINIC | Age: 74
End: 2024-03-26

## 2024-03-26 DIAGNOSIS — I25.10 CORONARY ARTERY DISEASE INVOLVING NATIVE CORONARY ARTERY OF NATIVE HEART WITHOUT ANGINA PECTORIS: ICD-10-CM

## 2024-03-26 DIAGNOSIS — I25.10 CORONARY ARTERY DISEASE, UNSPECIFIED VESSEL OR LESION TYPE, UNSPECIFIED WHETHER ANGINA PRESENT, UNSPECIFIED WHETHER NATIVE OR TRANSPLANTED HEART: Primary | ICD-10-CM

## 2024-03-26 DIAGNOSIS — I49.3 PVC'S (PREMATURE VENTRICULAR CONTRACTIONS): ICD-10-CM

## 2024-05-01 PROBLEM — Z00.00 MEDICARE ANNUAL WELLNESS VISIT, SUBSEQUENT: Status: RESOLVED | Noted: 2024-03-25 | Resolved: 2024-05-01

## 2024-05-01 PROBLEM — J01.90 ACUTE SINUSITIS: Status: RESOLVED | Noted: 2024-03-25 | Resolved: 2024-05-01

## 2024-05-08 ENCOUNTER — APPOINTMENT (OUTPATIENT)
Dept: LAB | Facility: CLINIC | Age: 74
End: 2024-05-08
Payer: MEDICARE

## 2024-05-08 DIAGNOSIS — I49.3 PVC'S (PREMATURE VENTRICULAR CONTRACTIONS): ICD-10-CM

## 2024-05-08 DIAGNOSIS — I25.10 CORONARY ARTERY DISEASE INVOLVING NATIVE CORONARY ARTERY OF NATIVE HEART WITHOUT ANGINA PECTORIS: ICD-10-CM

## 2024-05-08 DIAGNOSIS — R17 ELEVATED BILIRUBIN: ICD-10-CM

## 2024-05-08 DIAGNOSIS — I10 BENIGN ESSENTIAL HYPERTENSION: ICD-10-CM

## 2024-05-08 DIAGNOSIS — I25.10 CORONARY ARTERY DISEASE, UNSPECIFIED VESSEL OR LESION TYPE, UNSPECIFIED WHETHER ANGINA PRESENT, UNSPECIFIED WHETHER NATIVE OR TRANSPLANTED HEART: ICD-10-CM

## 2024-05-08 LAB
ALBUMIN SERPL BCP-MCNC: 4.1 G/DL (ref 3.5–5)
ALP SERPL-CCNC: 70 U/L (ref 34–104)
ALT SERPL W P-5'-P-CCNC: 21 U/L (ref 7–52)
ANION GAP SERPL CALCULATED.3IONS-SCNC: 9 MMOL/L (ref 4–13)
AST SERPL W P-5'-P-CCNC: 22 U/L (ref 13–39)
BILIRUB SERPL-MCNC: 1.71 MG/DL (ref 0.2–1)
BUN SERPL-MCNC: 18 MG/DL (ref 5–25)
CALCIUM SERPL-MCNC: 9.1 MG/DL (ref 8.4–10.2)
CHLORIDE SERPL-SCNC: 102 MMOL/L (ref 96–108)
CHOLEST SERPL-MCNC: 114 MG/DL
CO2 SERPL-SCNC: 29 MMOL/L (ref 21–32)
CREAT SERPL-MCNC: 0.95 MG/DL (ref 0.6–1.3)
GFR SERPL CREATININE-BSD FRML MDRD: 77 ML/MIN/1.73SQ M
GLUCOSE P FAST SERPL-MCNC: 81 MG/DL (ref 65–99)
HDLC SERPL-MCNC: 44 MG/DL
LDLC SERPL CALC-MCNC: 45 MG/DL (ref 0–100)
POTASSIUM SERPL-SCNC: 4.2 MMOL/L (ref 3.5–5.3)
PROT SERPL-MCNC: 7 G/DL (ref 6.4–8.4)
SODIUM SERPL-SCNC: 140 MMOL/L (ref 135–147)
TRIGL SERPL-MCNC: 126 MG/DL

## 2024-05-08 PROCEDURE — 80061 LIPID PANEL: CPT

## 2024-05-08 PROCEDURE — 36415 COLL VENOUS BLD VENIPUNCTURE: CPT

## 2024-05-08 PROCEDURE — 80053 COMPREHEN METABOLIC PANEL: CPT

## 2024-05-13 ENCOUNTER — OFFICE VISIT (OUTPATIENT)
Dept: CARDIOLOGY CLINIC | Facility: CLINIC | Age: 74
End: 2024-05-13
Payer: MEDICARE

## 2024-05-13 VITALS
HEART RATE: 85 BPM | WEIGHT: 262.4 LBS | HEIGHT: 70 IN | BODY MASS INDEX: 37.56 KG/M2 | OXYGEN SATURATION: 94 % | DIASTOLIC BLOOD PRESSURE: 76 MMHG | SYSTOLIC BLOOD PRESSURE: 134 MMHG

## 2024-05-13 DIAGNOSIS — I10 BENIGN ESSENTIAL HYPERTENSION: ICD-10-CM

## 2024-05-13 DIAGNOSIS — I25.10 CORONARY ARTERY DISEASE INVOLVING NATIVE CORONARY ARTERY OF NATIVE HEART WITHOUT ANGINA PECTORIS: Primary | ICD-10-CM

## 2024-05-13 PROCEDURE — 99214 OFFICE O/P EST MOD 30 MIN: CPT | Performed by: INTERNAL MEDICINE

## 2024-05-13 NOTE — PROGRESS NOTES
Cardiology Follow Up    Charles Franklin  1950  [id number]  [facility]  [address]  [address]  [place] [address] [phone]    1. Coronary artery disease involving native coronary artery of native heart without angina pectoris        2. Benign essential hypertension            Interval History: Followup cad    Doing well. No chest pain, dyspnea or palpitations.     Medical Problems       Problem List       Chronic pain syndrome    Chronic bilateral low back pain without sciatica    Coronary artery disease    Benign essential hypertension    PVC's (premature ventricular contractions)    Irregular heart rhythm    Obesity (BMI 30-39.9)    Elevated bilirubin    Adrenal nodule (HCC)    Lumbar disc herniation with radiculopathy    Degenerative lumbar spinal stenosis    Adolescent idiopathic scoliosis of lumbar region    Morbid obesity (HCC)    Allergic rhinitis        Past Medical History:   Diagnosis Date    Arthritis     Cancer (HCC)     Coronary artery disease     Coronary artery disease involving native coronary artery of native heart without angina pectoris     Edema, lower extremity     Last assessed 2015     Hyperlipidemia     Hypertension     Leukocytosis 2019    Melanoma (HCC)     back, removed    Stented coronary artery      Social History     Socioeconomic History    Marital status: Single     Spouse name: Not on file    Number of children: Not on file    Years of education: Not on file    Highest education level: Not on file   Occupational History    Not on file   Tobacco Use    Smoking status: Former     Current packs/day: 0.00     Average packs/day: 0.5 packs/day for 25.0 years (12.5 ttl pk-yrs)     Types: Cigarettes     Start date:      Quit date:      Years since quittin.3    Smokeless tobacco: Former   Vaping Use    Vaping status: Never Used   Substance and Sexual Activity    Alcohol use:  Not Currently    Drug use: No    Sexual activity: Not Currently   Other Topics Concern    Not on file   Social History Narrative    History of never a smoker, resolved 1/12/2015 - As per [employer]      Social Determinants of Health     Financial Resource Strain: Low Risk  (2/5/2023)    Overall Financial Resource Strain (CARDIA)     Difficulty of Paying Living Expenses: Not very hard   Food Insecurity: No Food Insecurity (3/25/2024)    Hunger Vital Sign     Worried About Running Out of Food in the Last Year: Never true     Ran Out of Food in the Last Year: Never true   Transportation Needs: No Transportation Needs (3/25/2024)    PRAPARE - Transportation     Lack of Transportation (Medical): No     Lack of Transportation (Non-Medical): No   Physical Activity: Not on file   Stress: Not on file   Social Connections: Not on file   Intimate Partner Violence: Not on file   Housing Stability: Low Risk  (3/25/2024)    Housing Stability Vital Sign     Unable to Pay for Housing in the Last Year: No     Number of Places Lived in the Last Year: 1     Unstable Housing in the Last Year: No      Family History   Problem Relation Age of Onset    ALS Mother     Prostate cancer Father     Pancreatic cancer Father     Breast cancer Sister     Arrhythmia Neg Hx     Clotting disorder Neg Hx     Fainting Neg Hx     Heart attack Neg Hx     Heart disease Neg Hx     Hypertension Neg Hx     Hyperlipidemia Neg Hx     Anuerysm Neg Hx     Stroke Neg Hx      Past Surgical History:   Procedure Laterality Date    BILATERAL VATS ABLATION      CAROTID STENT      Transcath placement of intrathoracic carotid artery stent     MALIGNANT SKIN LESION EXCISION      Trunk     NERVE BLOCK Bilateral 12/28/2018    Procedure: L3 L4 L5 S1 Medical Branch Block #2 ([id number]);  Surgeon: Fernando Mondragon MD;  Location: [facility] MAIN OR;  Service: Pain Management     NERVE BLOCK Bilateral 12/21/2018    Procedure: L3 L4 L5 S1 Medial Branch Block #1 ([id number]);  Surgeon: Fernando Mondragon MD;  Location: [facility] MAIN OR;  Service: Pain Management     RADIOFREQUENCY ABLATION Right 1/4/2019    Procedure: L3 L4 L5 S1 Radio Frequency Ablation;  Surgeon: Fernando Mondragon MD;  Location: [facility] MAIN OR;  Service: Pain Management     RADIOFREQUENCY ABLATION Left 1/18/2019    Procedure: L3 L4 L5 S1 Radio Frequency Ablation;  Surgeon: Fernando Mondragon MD;  Location: [facility] MAIN OR;  Service: Pain Management     SENTINEL LYMPH NODE BIOPSY         Current Outpatient Medications:     aspirin 1 mg/mL SUSP, Take 162 mg by mouth., Disp: , Rfl:     atorvastatin (LIPITOR) 40 mg tablet, TAKE 1 TABLET BY MOUTH  DAILY, Disp: 90 tablet, Rfl: 3    fluticasone (FLONASE) 50 mcg/act nasal spray, 1 spray into each nostril 2 (two) times a day, Disp: 16 mL, Rfl: 1    metoprolol succinate (TOPROL-XL) 50 mg 24 hr tablet, TAKE 1 TABLET BY MOUTH  TWICE DAILY, Disp: 180 tablet, Rfl: 3    nitroglycerin (NITROSTAT) 0.4 mg SL tablet, Place 1 tablet under the tongue, Disp: , Rfl:   No Known Allergies    Labs:     Chemistry        Component Value Date/Time     03/07/2015 1123    K 4.2 05/08/2024 1205    K 4.6 03/07/2015 1123     05/08/2024 1205     03/07/2015 1123    CO2 29 05/08/2024 1205    CO2 31 03/07/2015 1123    BUN 18 05/08/2024 1205    BUN 22 03/07/2015 1123    CREATININE 0.95 05/08/2024 1205    CREATININE 0.92 03/07/2015 1123        Component Value Date/Time    CALCIUM 9.1 05/08/2024 1205    CALCIUM 9.5 03/07/2015 1123    ALKPHOS 70 05/08/2024 1205    ALKPHOS 81 07/13/2015 1108    AST 22 05/08/2024 1205    AST 34 07/13/2015 1108    ALT 21 05/08/2024 1205    ALT 14 07/13/2015 1108    BILITOT 1.3 (H) 07/13/2015 1108            Lab Results   Component Value Date    CHOL 107 07/13/2015    CHOL 105 02/05/2015     Lab Results   Component Value Date    HDL 44 05/08/2024    HDL 38 (L) 09/22/2023    HDL 46 02/07/2023     Lab Results   Component Value Date    LDLCALC 45 05/08/2024    LDLCALC 49  "09/22/2023    LDLCALC 61 02/07/2023     Lab Results   Component Value Date    TRIG 126 05/08/2024    TRIG 80 09/22/2023    TRIG 91 02/07/2023     No results found for: \"CHOLHDL\"    Imaging: No results found.    EKG: .    Review of Systems   Constitutional: Negative.   HENT: Negative.     Eyes: Negative.    Cardiovascular: Negative.    Respiratory: Negative.     Endocrine: Negative.    Hematologic/Lymphatic: Negative.    Skin: Negative.    Musculoskeletal: Negative.    Gastrointestinal: Negative.    Genitourinary: Negative.    Neurological: Negative.    Psychiatric/Behavioral: Negative.     Allergic/Immunologic: Negative.        Vitals:    05/13/24 1400   BP: 134/76   Pulse: 85   SpO2: 94%           Physical Exam  Vitals reviewed.   Constitutional:       Appearance: Normal appearance.   HENT:      Head: Normocephalic.      Nose: Nose normal.      Mouth/Throat:      Mouth: Mucous membranes are moist.      Pharynx: Oropharynx is clear.   Eyes:      General: No scleral icterus.     Conjunctiva/sclera: Conjunctivae normal.   Cardiovascular:      Rate and Rhythm: Normal rate and regular rhythm.      Heart sounds: No murmur heard.     No friction rub. No gallop.   Pulmonary:      Effort: Pulmonary effort is normal. No respiratory distress.      Breath sounds: Normal breath sounds. No wheezing or rales.   Abdominal:      General: Abdomen is flat. Bowel sounds are normal. There is no distension.      Palpations: Abdomen is soft.      Tenderness: There is no abdominal tenderness. There is no guarding.   Musculoskeletal:      Cervical back: Normal range of motion and neck supple.      Right lower leg: No edema.      Left lower leg: No edema.   Skin:     General: Skin is warm and dry.   Neurological:      General: No focal deficit present.      Mental Status: He is alert and oriented to person, place, and time.   Psychiatric:         Mood and Affect: Mood normal.         Behavior: Behavior normal. "         Discussion/Summary:    1. Coronary Artery Disease: Hx of YOGESH to OM and RPDA. Overall stable. Continue medical therapy. LDL was 45. He has no angina.      2. HTN: BP is stable on medical therapy.      3. PVCs Continue with  Metoprolol. Asymptomatic. EF normal on last echocardiogram.       The patient was counseled regarding diagnostic results, instructions for management, risk factor reductions, impressions. total time of encounter was 25 minutes and 15 minutes was spent counseling.

## 2024-07-18 DIAGNOSIS — I25.10 CORONARY ARTERY DISEASE INVOLVING NATIVE CORONARY ARTERY OF NATIVE HEART WITHOUT ANGINA PECTORIS: ICD-10-CM

## 2024-07-18 DIAGNOSIS — I49.3 PVC (PREMATURE VENTRICULAR CONTRACTION): ICD-10-CM

## 2024-07-19 RX ORDER — METOPROLOL SUCCINATE 50 MG/1
50 TABLET, EXTENDED RELEASE ORAL 2 TIMES DAILY
Qty: 180 TABLET | Refills: 1 | Status: SHIPPED | OUTPATIENT
Start: 2024-07-19

## 2024-08-05 DIAGNOSIS — I25.10 CAD (CORONARY ARTERY DISEASE): ICD-10-CM

## 2024-08-06 RX ORDER — ATORVASTATIN CALCIUM 40 MG/1
40 TABLET, FILM COATED ORAL DAILY
Qty: 90 TABLET | Refills: 1 | Status: SHIPPED | OUTPATIENT
Start: 2024-08-06

## 2024-09-23 ENCOUNTER — OFFICE VISIT (OUTPATIENT)
Dept: FAMILY MEDICINE CLINIC | Facility: CLINIC | Age: 74
End: 2024-09-23
Payer: MEDICARE

## 2024-09-23 VITALS
BODY MASS INDEX: 37.65 KG/M2 | DIASTOLIC BLOOD PRESSURE: 76 MMHG | WEIGHT: 263 LBS | SYSTOLIC BLOOD PRESSURE: 142 MMHG | HEART RATE: 72 BPM | RESPIRATION RATE: 16 BRPM | OXYGEN SATURATION: 95 % | HEIGHT: 70 IN

## 2024-09-23 DIAGNOSIS — I10 BENIGN ESSENTIAL HYPERTENSION: ICD-10-CM

## 2024-09-23 DIAGNOSIS — M48.061 DEGENERATIVE LUMBAR SPINAL STENOSIS: ICD-10-CM

## 2024-09-23 DIAGNOSIS — Z23 ENCOUNTER FOR IMMUNIZATION: ICD-10-CM

## 2024-09-23 DIAGNOSIS — E66.01 MORBID OBESITY (HCC): ICD-10-CM

## 2024-09-23 DIAGNOSIS — J01.00 ACUTE NON-RECURRENT MAXILLARY SINUSITIS: ICD-10-CM

## 2024-09-23 DIAGNOSIS — I25.10 CORONARY ARTERY DISEASE INVOLVING NATIVE CORONARY ARTERY OF NATIVE HEART WITHOUT ANGINA PECTORIS: Primary | ICD-10-CM

## 2024-09-23 DIAGNOSIS — Z12.5 PROSTATE CANCER SCREENING: ICD-10-CM

## 2024-09-23 PROCEDURE — 90677 PCV20 VACCINE IM: CPT | Performed by: FAMILY MEDICINE

## 2024-09-23 PROCEDURE — 99214 OFFICE O/P EST MOD 30 MIN: CPT | Performed by: FAMILY MEDICINE

## 2024-09-23 PROCEDURE — G0009 ADMIN PNEUMOCOCCAL VACCINE: HCPCS | Performed by: FAMILY MEDICINE

## 2024-09-23 PROCEDURE — G0008 ADMIN INFLUENZA VIRUS VAC: HCPCS | Performed by: FAMILY MEDICINE

## 2024-09-23 PROCEDURE — 90662 IIV NO PRSV INCREASED AG IM: CPT | Performed by: FAMILY MEDICINE

## 2024-09-23 RX ORDER — MELOXICAM 7.5 MG/1
7.5 TABLET ORAL DAILY PRN
Qty: 90 TABLET | Refills: 1 | Status: SHIPPED | OUTPATIENT
Start: 2024-09-23 | End: 2025-03-22

## 2024-09-23 NOTE — PROGRESS NOTES
Subjective:      Patient ID: Charles Franklin is a 74 y.o. male.    74-year-old male presents as new patient to me.  Previously was seeing other provider in the office that has left the network.  Patient has history of hypertension, hyperlipidemia, malignant melanoma, coronary artery disease.  Patient does follow-up with cardiologist.  He complains of arthritic pain in multiple sites including wrists, fingers, back, neck.  Patient also complains of maxillary sinus pressure.  He was treated in March for acute maxillary sinusitis.  Uses fluticasone nasal spray for seasonal allergies.  Last set of labs were in May        Past Medical History:   Diagnosis Date    Arthritis     Cancer (HCC)     Coronary artery disease     Coronary artery disease involving native coronary artery of native heart without angina pectoris     Edema, lower extremity     Last assessed 1/12/2015     Hyperlipidemia     Hypertension     Leukocytosis 11/06/2019    Melanoma (HCC)     back, removed    Stented coronary artery        Family History   Problem Relation Age of Onset    ALS Mother     Prostate cancer Father     Pancreatic cancer Father     Breast cancer Sister     Arrhythmia Neg Hx     Clotting disorder Neg Hx     Fainting Neg Hx     Heart attack Neg Hx     Heart disease Neg Hx     Hypertension Neg Hx     Hyperlipidemia Neg Hx     Anuerysm Neg Hx     Stroke Neg Hx        Past Surgical History:   Procedure Laterality Date    BILATERAL VATS ABLATION      CAROTID STENT      Transcath placement of intrathoracic carotid artery stent     MALIGNANT SKIN LESION EXCISION      Trunk     NERVE BLOCK Bilateral 12/28/2018    Procedure: L3 L4 L5 S1 Medical Branch Block #2 ([id number]);  Surgeon: Fernando Mnodragon MD;  Location: [facility] MAIN OR;  Service: Pain Management     NERVE BLOCK Bilateral 12/21/2018    Procedure: L3 L4 L5 S1 Medial Branch Block #1 ([id number]);  Surgeon: Fernando Mondragon MD;  Location: [facility] MAIN OR;  Service: Pain  Management     RADIOFREQUENCY ABLATION Right 1/4/2019    Procedure: L3 L4 L5 S1 Radio Frequency Ablation;  Surgeon: Fernando Mondragon MD;  Location: [facility] MAIN OR;  Service: Pain Management     RADIOFREQUENCY ABLATION Left 1/18/2019    Procedure: L3 L4 L5 S1 Radio Frequency Ablation;  Surgeon: Fernando Mondragon MD;  Location: [facility] MAIN OR;  Service: Pain Management     SENTINEL LYMPH NODE BIOPSY          reports that he quit smoking about 39 years ago. His smoking use included cigarettes. He started smoking about 64 years ago. He has a 12.5 pack-year smoking history. He has quit using smokeless tobacco. He reports that he does not currently use alcohol. He reports that he does not use drugs.      Current Outpatient Medications:     amoxicillin-clavulanate (AUGMENTIN) 875-125 mg per tablet, Take 1 tablet by mouth every 12 (twelve) hours for 7 days, Disp: 14 tablet, Rfl: 0    aspirin 1 mg/mL SUSP, Take 162 mg by mouth., Disp: , Rfl:     atorvastatin (LIPITOR) 40 mg tablet, TAKE 1 TABLET BY MOUTH DAILY, Disp: 90 tablet, Rfl: 1    fluticasone (FLONASE) 50 mcg/act nasal spray, 1 spray into each nostril 2 (two) times a day, Disp: 16 mL, Rfl: 1    meloxicam (MOBIC) 7.5 mg tablet, Take 1 tablet (7.5 mg total) by mouth daily as needed for moderate pain, Disp: 90 tablet, Rfl: 1    metoprolol succinate (TOPROL-XL) 50 mg 24 hr tablet, TAKE 1 TABLET BY MOUTH TWICE  DAILY, Disp: 180 tablet, Rfl: 1    nitroglycerin (NITROSTAT) 0.4 mg SL tablet, Place 1 tablet under the tongue Only taking PRN, Disp: , Rfl:     The following portions of the patient's history were reviewed and updated as appropriate: allergies, current medications, past family history, past medical history, past social history, past surgical history and problem list.    Review of Systems   Constitutional: Negative.  Negative for chills and fever.   HENT:  Positive for congestion and sinus pressure.    Eyes: Negative.    Respiratory: Negative.     Cardiovascular:  "Negative.    Gastrointestinal: Negative.    Endocrine: Negative.    Genitourinary: Negative.    Musculoskeletal:  Positive for arthralgias, back pain and neck pain.   Skin: Negative.    Allergic/Immunologic: Negative.    Neurological: Negative.    Hematological: Negative.    Psychiatric/Behavioral: Negative.     All other systems reviewed and are negative.          Objective:    /76   Pulse 72   Resp 16   Ht 5' 10\" (1.778 m)   Wt 119 kg (263 lb)   SpO2 95%   BMI 37.74 kg/m²      Physical Exam  Vitals and nursing note reviewed.   Constitutional:       General: He is not in acute distress.     Appearance: Normal appearance. He is well-developed. He is obese. He is not ill-appearing.   HENT:      Head: Normocephalic and atraumatic.      Right Ear: Tympanic membrane, ear canal and external ear normal.      Left Ear: Tympanic membrane, ear canal and external ear normal.      Nose: Nose normal.      Mouth/Throat:      Comments: Multiple dental caries and broken teeth  Eyes:      Extraocular Movements: Extraocular movements intact.      Conjunctiva/sclera: Conjunctivae normal.      Pupils: Pupils are equal, round, and reactive to light.   Cardiovascular:      Rate and Rhythm: Normal rate and regular rhythm.      Pulses: Normal pulses.      Heart sounds: Normal heart sounds. No murmur heard.  Pulmonary:      Effort: Pulmonary effort is normal.      Breath sounds: Normal breath sounds.   Abdominal:      General: Abdomen is flat. Bowel sounds are normal.      Palpations: Abdomen is soft.   Musculoskeletal:         General: Deformity (Right wrist with diffuse arthritic changes) present. Normal range of motion.      Cervical back: Normal range of motion and neck supple.      Right lower leg: Edema (2+ pitting) present.      Left lower leg: Edema (2+ pitting) present.   Skin:     General: Skin is warm and dry.   Neurological:      General: No focal deficit present.      Mental Status: He is alert and oriented to " person, place, and time.   Psychiatric:         Mood and Affect: Mood normal.         Behavior: Behavior normal.         Thought Content: Thought content normal.         Judgment: Judgment normal.           No results found for this or any previous visit (from the past 1008 hour(s)).    Assessment/Plan:    Coronary artery disease  Follow-up with cardiology as scheduled.  Remains on atorvastatin, aspirin, beta-blocker     check CBC, TSH, lipid panel.  Goal LDL less than 70        Benign essential hypertension  Remains adequately controlled on beta-blocker    Acute non-recurrent maxillary sinusitis  Patient was placed on course of Augmentin at his request.  This is what he had last time    Degenerative lumbar spinal stenosis  Patient takes Tylenol.  Will try placing patient on meloxicam 7.5 mg once daily    Morbid obesity (HCC)  Needs to work on losing weight.  74 years of age    Encounter for immunization  Flu vaccine and Prevnar 20 provided in the office          Problem List Items Addressed This Visit          Cardiovascular and Mediastinum    Benign essential hypertension     Remains adequately controlled on beta-blocker         Relevant Orders    TSH, 3rd generation with Free T4 reflex    Coronary artery disease - Primary     Follow-up with cardiology as scheduled.  Remains on atorvastatin, aspirin, beta-blocker     check CBC, TSH, lipid panel.  Goal LDL less than 70             Relevant Orders    CBC and differential    Comprehensive metabolic panel    Lipid panel       Respiratory    Acute non-recurrent maxillary sinusitis     Patient was placed on course of Augmentin at his request.  This is what he had last time         Relevant Medications    amoxicillin-clavulanate (AUGMENTIN) 875-125 mg per tablet       Orthopedic/Musculoskeletal    Degenerative lumbar spinal stenosis     Patient takes Tylenol.  Will try placing patient on meloxicam 7.5 mg once daily         Relevant Medications    meloxicam (MOBIC) 7.5 mg  tablet       Other    Encounter for immunization     Flu vaccine and Prevnar 20 provided in the office         Relevant Orders    influenza vaccine, high-dose, PF 0.5 mL (Fluzone High Dose)    Pneumococcal Conjugate Vaccine 20-valent (Pcv20)    Morbid obesity (HCC)     Needs to work on losing weight.  74 years of age         Prostate cancer screening    Relevant Orders    PSA, Total Screen

## 2024-09-23 NOTE — ASSESSMENT & PLAN NOTE
Follow-up with cardiology as scheduled.  Remains on atorvastatin, aspirin, beta-blocker     check CBC, TSH, lipid panel.  Goal LDL less than 70

## 2024-09-26 ENCOUNTER — APPOINTMENT (OUTPATIENT)
Dept: LAB | Facility: CLINIC | Age: 74
End: 2024-09-26
Payer: MEDICARE

## 2024-09-26 DIAGNOSIS — I10 BENIGN ESSENTIAL HYPERTENSION: ICD-10-CM

## 2024-09-26 DIAGNOSIS — Z12.5 PROSTATE CANCER SCREENING: ICD-10-CM

## 2024-09-26 DIAGNOSIS — I25.10 CORONARY ARTERY DISEASE INVOLVING NATIVE CORONARY ARTERY OF NATIVE HEART WITHOUT ANGINA PECTORIS: ICD-10-CM

## 2024-09-26 LAB
ALBUMIN SERPL BCG-MCNC: 3.9 G/DL (ref 3.5–5)
ALP SERPL-CCNC: 70 U/L (ref 34–104)
ALT SERPL W P-5'-P-CCNC: 23 U/L (ref 7–52)
ANION GAP SERPL CALCULATED.3IONS-SCNC: 5 MMOL/L (ref 4–13)
AST SERPL W P-5'-P-CCNC: 24 U/L (ref 13–39)
BASOPHILS # BLD AUTO: 0.04 THOUSANDS/ΜL (ref 0–0.1)
BASOPHILS NFR BLD AUTO: 1 % (ref 0–1)
BILIRUB SERPL-MCNC: 1.15 MG/DL (ref 0.2–1)
BUN SERPL-MCNC: 24 MG/DL (ref 5–25)
CALCIUM SERPL-MCNC: 8.9 MG/DL (ref 8.4–10.2)
CHLORIDE SERPL-SCNC: 103 MMOL/L (ref 96–108)
CHOLEST SERPL-MCNC: 108 MG/DL
CO2 SERPL-SCNC: 34 MMOL/L (ref 21–32)
CREAT SERPL-MCNC: 0.89 MG/DL (ref 0.6–1.3)
EOSINOPHIL # BLD AUTO: 0.35 THOUSAND/ΜL (ref 0–0.61)
EOSINOPHIL NFR BLD AUTO: 4 % (ref 0–6)
ERYTHROCYTE [DISTWIDTH] IN BLOOD BY AUTOMATED COUNT: 12.6 % (ref 11.6–15.1)
GFR SERPL CREATININE-BSD FRML MDRD: 83 ML/MIN/1.73SQ M
GLUCOSE P FAST SERPL-MCNC: 86 MG/DL (ref 65–99)
HCT VFR BLD AUTO: 44.2 % (ref 36.5–49.3)
HDLC SERPL-MCNC: 40 MG/DL
HGB BLD-MCNC: 14.3 G/DL (ref 12–17)
IMM GRANULOCYTES # BLD AUTO: 0.02 THOUSAND/UL (ref 0–0.2)
IMM GRANULOCYTES NFR BLD AUTO: 0 % (ref 0–2)
LDLC SERPL CALC-MCNC: 52 MG/DL (ref 0–100)
LYMPHOCYTES # BLD AUTO: 2.23 THOUSANDS/ΜL (ref 0.6–4.47)
LYMPHOCYTES NFR BLD AUTO: 25 % (ref 14–44)
MCH RBC QN AUTO: 31.8 PG (ref 26.8–34.3)
MCHC RBC AUTO-ENTMCNC: 32.4 G/DL (ref 31.4–37.4)
MCV RBC AUTO: 98 FL (ref 82–98)
MONOCYTES # BLD AUTO: 0.66 THOUSAND/ΜL (ref 0.17–1.22)
MONOCYTES NFR BLD AUTO: 8 % (ref 4–12)
NEUTROPHILS # BLD AUTO: 5.54 THOUSANDS/ΜL (ref 1.85–7.62)
NEUTS SEG NFR BLD AUTO: 62 % (ref 43–75)
NONHDLC SERPL-MCNC: 68 MG/DL
NRBC BLD AUTO-RTO: 0 /100 WBCS
PLATELET # BLD AUTO: 209 THOUSANDS/UL (ref 149–390)
PMV BLD AUTO: 9.6 FL (ref 8.9–12.7)
POTASSIUM SERPL-SCNC: 5.2 MMOL/L (ref 3.5–5.3)
PROT SERPL-MCNC: 6.6 G/DL (ref 6.4–8.4)
PSA SERPL-MCNC: 0.24 NG/ML (ref 0–4)
RBC # BLD AUTO: 4.49 MILLION/UL (ref 3.88–5.62)
SODIUM SERPL-SCNC: 142 MMOL/L (ref 135–147)
TRIGL SERPL-MCNC: 80 MG/DL
TSH SERPL DL<=0.05 MIU/L-ACNC: 1.52 UIU/ML (ref 0.45–4.5)
WBC # BLD AUTO: 8.84 THOUSAND/UL (ref 4.31–10.16)

## 2024-09-26 PROCEDURE — G0103 PSA SCREENING: HCPCS

## 2024-09-26 PROCEDURE — 36415 COLL VENOUS BLD VENIPUNCTURE: CPT

## 2024-09-26 PROCEDURE — 84443 ASSAY THYROID STIM HORMONE: CPT

## 2024-09-26 PROCEDURE — 85025 COMPLETE CBC W/AUTO DIFF WBC: CPT

## 2024-09-26 PROCEDURE — 80053 COMPREHEN METABOLIC PANEL: CPT

## 2024-09-26 PROCEDURE — 80061 LIPID PANEL: CPT

## 2024-09-30 NOTE — RESULT ENCOUNTER NOTE
Please call patient and notify that results are normal. No changes in medications. Please keep follow-up appointment as scheduled

## 2024-10-23 PROBLEM — J01.00 ACUTE NON-RECURRENT MAXILLARY SINUSITIS: Status: RESOLVED | Noted: 2024-03-25 | Resolved: 2024-10-23

## 2024-10-23 PROBLEM — Z12.5 PROSTATE CANCER SCREENING: Status: RESOLVED | Noted: 2024-09-23 | Resolved: 2024-10-23

## 2024-11-03 DIAGNOSIS — M48.061 DEGENERATIVE LUMBAR SPINAL STENOSIS: ICD-10-CM

## 2024-11-03 RX ORDER — MELOXICAM 7.5 MG/1
TABLET ORAL
Qty: 90 TABLET | Refills: 1 | Status: SHIPPED | OUTPATIENT
Start: 2024-11-03 | End: 2024-11-04 | Stop reason: SDUPTHER

## 2024-11-04 DIAGNOSIS — M48.061 DEGENERATIVE LUMBAR SPINAL STENOSIS: ICD-10-CM

## 2024-11-04 RX ORDER — MELOXICAM 7.5 MG/1
7.5 TABLET ORAL DAILY
Qty: 90 TABLET | Refills: 1 | Status: SHIPPED | OUTPATIENT
Start: 2024-11-04 | End: 2024-11-05 | Stop reason: SDUPTHER

## 2024-11-04 NOTE — TELEPHONE ENCOUNTER
Patient was told to take 2 a day. Please update Sig and send to [facility]    Reason for call:   [x] Refill   [] Prior Auth  [] Other:     Office:   [x] PCP/Provider - Dr. Le  [] Specialty/Provider -     Medication: meloxicam (MOBIC) 7.5 mg    Dose/Frequency: TAKE 1 TABLET(7.5 MG) BY MOUTH DAILY AS NEEDED FOR MODERATE PAIN     Quantity: 90    Pharmacy: [facility] - [place], [place] - [address]     Does the patient have enough for 3 days?   [x] Yes   [] No - Send as HP to POD

## 2024-11-05 ENCOUNTER — TELEPHONE (OUTPATIENT)
Dept: FAMILY MEDICINE CLINIC | Facility: CLINIC | Age: 74
End: 2024-11-05

## 2024-11-05 DIAGNOSIS — M48.061 DEGENERATIVE LUMBAR SPINAL STENOSIS: ICD-10-CM

## 2024-11-05 RX ORDER — MELOXICAM 15 MG/1
15 TABLET ORAL DAILY
Qty: 90 TABLET | Refills: 0 | Status: SHIPPED | OUTPATIENT
Start: 2024-11-05

## 2024-11-05 NOTE — TELEPHONE ENCOUNTER
Patient called the RX Refill Line. Message is being forwarded to the office.     Taking differently - Meloxicam 7.5 mg BID    Patient significant other (not on communication consent form) called for the status of his meloxicam one tablet BID.     I did not advised script was sent to [facility] for once daily, but Lavonne stated dr cornejo was to change the script to 2 times daily.     Please contact patient significant other Lavonne at [phone] to advised accordingly and when completed.    Please review and order if appropriate

## 2024-11-05 NOTE — TELEPHONE ENCOUNTER
I advised Lavonne that the meloxicam 7.5 mg is a once daily medication.  She stated that he has been taking it twice a day because it helps with his pain.  She is asking if a higher dose can be prescribed?

## 2024-11-20 ENCOUNTER — OFFICE VISIT (OUTPATIENT)
Dept: CARDIOLOGY CLINIC | Facility: CLINIC | Age: 74
End: 2024-11-20
Payer: MEDICARE

## 2024-11-20 VITALS
HEIGHT: 70 IN | HEART RATE: 70 BPM | DIASTOLIC BLOOD PRESSURE: 82 MMHG | BODY MASS INDEX: 37.97 KG/M2 | WEIGHT: 265.2 LBS | SYSTOLIC BLOOD PRESSURE: 148 MMHG | OXYGEN SATURATION: 95 %

## 2024-11-20 DIAGNOSIS — I10 BENIGN ESSENTIAL HYPERTENSION: ICD-10-CM

## 2024-11-20 DIAGNOSIS — I25.10 CORONARY ARTERY DISEASE INVOLVING NATIVE CORONARY ARTERY OF NATIVE HEART WITHOUT ANGINA PECTORIS: Primary | ICD-10-CM

## 2024-11-20 DIAGNOSIS — I49.3 PVC'S (PREMATURE VENTRICULAR CONTRACTIONS): ICD-10-CM

## 2024-11-20 PROCEDURE — 99214 OFFICE O/P EST MOD 30 MIN: CPT | Performed by: INTERNAL MEDICINE

## 2024-11-20 PROCEDURE — 93000 ELECTROCARDIOGRAM COMPLETE: CPT | Performed by: INTERNAL MEDICINE

## 2024-11-20 RX ORDER — NITROGLYCERIN 0.4 MG/1
0.4 TABLET SUBLINGUAL
Qty: 15 TABLET | Refills: 3 | Status: SHIPPED | OUTPATIENT
Start: 2024-11-20

## 2024-11-20 NOTE — PROGRESS NOTES
Cardiology Follow Up    Charles Franklin  1950  [id number]  [facility]  [address]  [address]  [facility] [address] [phone]    1. Coronary artery disease involving native coronary artery of native heart without angina pectoris  POCT ECG      2. Benign essential hypertension        3. PVC's (premature ventricular contractions)            Interval History: Followup CAD.    No chest pain, dyspnea or palpitations. Doing well with medical therapy.     Medical Problems       Problem List       Chronic pain syndrome    Chronic bilateral low back pain without sciatica    Coronary artery disease    Benign essential hypertension    PVC's (premature ventricular contractions)    Irregular heart rhythm    Obesity (BMI 30-39.9)    Elevated bilirubin    Adrenal nodule (HCC)    Lumbar disc herniation with radiculopathy    Degenerative lumbar spinal stenosis    Adolescent idiopathic scoliosis of lumbar region    Morbid obesity (HCC)    Allergic rhinitis    Encounter for immunization        Past Medical History:   Diagnosis Date    Arthritis     Cancer (HCC)     Coronary artery disease     Coronary artery disease involving native coronary artery of native heart without angina pectoris     Edema, lower extremity     Last assessed 2015     Hyperlipidemia     Hypertension     Leukocytosis 2019    Melanoma (HCC)     back, removed    Stented coronary artery      Social History     Socioeconomic History    Marital status: Single     Spouse name: Not on file    Number of children: Not on file    Years of education: Not on file    Highest education level: Not on file   Occupational History    Not on file   Tobacco Use    Smoking status: Former     Current packs/day: 0.00     Average packs/day: 0.5 packs/day for 25.0 years (12.5 ttl pk-yrs)     Types: Cigarettes     Start date:      Quit date:      Years since quittin.9     Smokeless tobacco: Former   Vaping Use    Vaping status: Never Used   Substance and Sexual Activity    Alcohol use: Not Currently    Drug use: No    Sexual activity: Not Currently   Other Topics Concern    Not on file   Social History Narrative    History of never a smoker, resolved 1/12/2015 - As per [employer]      Social Drivers of Health     Financial Resource Strain: Low Risk  (2/5/2023)    Overall Financial Resource Strain (CARDIA)     Difficulty of Paying Living Expenses: Not very hard   Food Insecurity: No Food Insecurity (3/25/2024)    Nursing - Inadequate Food Risk Classification     Worried About Running Out of Food in the Last Year: Never true     Ran Out of Food in the Last Year: Never true     Ran Out of Food in the Last Year: Not on file   Transportation Needs: No Transportation Needs (3/25/2024)    PRAPARE - Transportation     Lack of Transportation (Medical): No     Lack of Transportation (Non-Medical): No   Physical Activity: Not on file   Stress: Not on file   Social Connections: Not on file   Intimate Partner Violence: Not on file   Housing Stability: Low Risk  (3/25/2024)    Housing Stability Vital Sign     Unable to Pay for Housing in the Last Year: No     Number of Times Moved in the Last Year: 1     Homeless in the Last Year: No      Family History   Problem Relation Age of Onset    ALS Mother     Prostate cancer Father     Pancreatic cancer Father     Breast cancer Sister     Arrhythmia Neg Hx     Clotting disorder Neg Hx     Fainting Neg Hx     Heart attack Neg Hx     Heart disease Neg Hx     Hypertension Neg Hx     Hyperlipidemia Neg Hx     Anuerysm Neg Hx     Stroke Neg Hx      Past Surgical History:   Procedure Laterality Date    BILATERAL VATS ABLATION      CAROTID STENT      Transcath placement of intrathoracic carotid artery stent     MALIGNANT SKIN LESION EXCISION      Trunk     NERVE BLOCK Bilateral 12/28/2018    Procedure: L3 L4 L5 S1 Medical Branch Block #2 ([id number]);   Surgeon: Fernando Mondragon MD;  Location: [facility] MAIN OR;  Service: Pain Management     NERVE BLOCK Bilateral 12/21/2018    Procedure: L3 L4 L5 S1 Medial Branch Block #1 ([id number]);  Surgeon: Fernando Mondragon MD;  Location: [facility] MAIN OR;  Service: Pain Management     RADIOFREQUENCY ABLATION Right 1/4/2019    Procedure: L3 L4 L5 S1 Radio Frequency Ablation;  Surgeon: Fernando Mondragon MD;  Location: [facility] MAIN OR;  Service: Pain Management     RADIOFREQUENCY ABLATION Left 1/18/2019    Procedure: L3 L4 L5 S1 Radio Frequency Ablation;  Surgeon: Fernando Mondragon MD;  Location: [facility] MAIN OR;  Service: Pain Management     SENTINEL LYMPH NODE BIOPSY         Current Outpatient Medications:     aspirin 1 mg/mL SUSP, Take 162 mg by mouth., Disp: , Rfl:     atorvastatin (LIPITOR) 40 mg tablet, TAKE 1 TABLET BY MOUTH DAILY, Disp: 90 tablet, Rfl: 1    fluticasone (FLONASE) 50 mcg/act nasal spray, 1 spray into each nostril 2 (two) times a day, Disp: 16 mL, Rfl: 1    meloxicam (MOBIC) 15 mg tablet, Take 1 tablet (15 mg total) by mouth daily, Disp: 90 tablet, Rfl: 0    metoprolol succinate (TOPROL-XL) 50 mg 24 hr tablet, TAKE 1 TABLET BY MOUTH TWICE  DAILY, Disp: 180 tablet, Rfl: 1    nitroglycerin (NITROSTAT) 0.4 mg SL tablet, Place 1 tablet under the tongue Only taking PRN, Disp: , Rfl:   No Known Allergies    Labs:     Chemistry        Component Value Date/Time     03/07/2015 1123    K 5.2 09/26/2024 1034    K 4.6 03/07/2015 1123     09/26/2024 1034     03/07/2015 1123    CO2 34 (H) 09/26/2024 1034    CO2 31 03/07/2015 1123    BUN 24 09/26/2024 1034    BUN 22 03/07/2015 1123    CREATININE 0.89 09/26/2024 1034    CREATININE 0.92 03/07/2015 1123        Component Value Date/Time    CALCIUM 8.9 09/26/2024 1034    CALCIUM 9.5 03/07/2015 1123    ALKPHOS 70 09/26/2024 1034    ALKPHOS 81 07/13/2015 1108    AST 24 09/26/2024 1034    AST 34 07/13/2015 1108    ALT 23 09/26/2024 1034    ALT 14 07/13/2015 1108     "BILITOT 1.3 (H) 07/13/2015 1108            Lab Results   Component Value Date    CHOL 107 07/13/2015    CHOL 105 02/05/2015     Lab Results   Component Value Date    HDL 40 09/26/2024    HDL 44 05/08/2024    HDL 38 (L) 09/22/2023     Lab Results   Component Value Date    LDLCALC 52 09/26/2024    LDLCALC 45 05/08/2024    LDLCALC 49 09/22/2023     Lab Results   Component Value Date    TRIG 80 09/26/2024    TRIG 126 05/08/2024    TRIG 80 09/22/2023     No results found for: \"CHOLHDL\"    Imaging: No results found.    EKG: Normal Sinus Rhythm with PVCs.    Review of Systems   Constitutional: Negative.   HENT: Negative.     Eyes: Negative.    Cardiovascular: Negative.    Respiratory: Negative.     Endocrine: Negative.    Hematologic/Lymphatic: Negative.    Skin: Negative.    Musculoskeletal: Negative.    Gastrointestinal: Negative.    Genitourinary: Negative.    Neurological: Negative.    Psychiatric/Behavioral: Negative.     Allergic/Immunologic: Negative.        Vitals:    11/20/24 1531   BP: 148/82   Pulse: 70   SpO2: 95%           Physical Exam  Vitals reviewed.   Constitutional:       Appearance: Normal appearance.   HENT:      Head: Normocephalic.      Nose: Nose normal.      Mouth/Throat:      Mouth: Mucous membranes are moist.      Pharynx: Oropharynx is clear.   Eyes:      General: No scleral icterus.     Conjunctiva/sclera: Conjunctivae normal.   Cardiovascular:      Rate and Rhythm: Normal rate and regular rhythm.      Heart sounds: No murmur heard.     No friction rub. No gallop.   Pulmonary:      Effort: Pulmonary effort is normal. No respiratory distress.      Breath sounds: Normal breath sounds. No wheezing or rales.   Abdominal:      General: Abdomen is flat. Bowel sounds are normal. There is no distension.      Palpations: Abdomen is soft.      Tenderness: There is no abdominal tenderness. There is no guarding.   Musculoskeletal:      Cervical back: Normal range of motion and neck supple.      Right lower " leg: No edema.      Left lower leg: No edema.   Skin:     General: Skin is warm and dry.   Neurological:      General: No focal deficit present.      Mental Status: He is alert and oriented to person, place, and time.   Psychiatric:         Mood and Affect: Mood normal.         Behavior: Behavior normal.         Discussion/Summary:    1. Coronary Artery Disease: Hx of YOGESH to OM and RPDA. Overall stable. Continue medical therapy. LDL was 52. He has no angina.      2. HTN: BP is borderline elevated today. Monitor as outpatient.      3. PVCs Continue with  Metoprolol. Asymptomatic. EF normal on last echocardiogram.     I have spent a total time of 25 minutes in caring for this patient on the day of the visit/encounter including Diagnostic results, Prognosis, Risks and benefits of tx options, Instructions for management, and Patient and family education.

## 2024-12-11 DIAGNOSIS — I25.10 CORONARY ARTERY DISEASE INVOLVING NATIVE CORONARY ARTERY OF NATIVE HEART WITHOUT ANGINA PECTORIS: ICD-10-CM

## 2024-12-11 DIAGNOSIS — I49.3 PVC (PREMATURE VENTRICULAR CONTRACTION): ICD-10-CM

## 2024-12-11 RX ORDER — METOPROLOL SUCCINATE 50 MG/1
50 TABLET, EXTENDED RELEASE ORAL 2 TIMES DAILY
Qty: 180 TABLET | Refills: 1 | Status: SHIPPED | OUTPATIENT
Start: 2024-12-11

## 2024-12-31 DIAGNOSIS — I25.10 CAD (CORONARY ARTERY DISEASE): ICD-10-CM

## 2025-01-02 RX ORDER — ATORVASTATIN CALCIUM 40 MG/1
40 TABLET, FILM COATED ORAL DAILY
Qty: 90 TABLET | Refills: 1 | Status: SHIPPED | OUTPATIENT
Start: 2025-01-02

## 2025-01-12 DIAGNOSIS — M48.061 DEGENERATIVE LUMBAR SPINAL STENOSIS: ICD-10-CM

## 2025-01-13 RX ORDER — MELOXICAM 15 MG/1
15 TABLET ORAL DAILY
Qty: 90 TABLET | Refills: 3 | Status: SHIPPED | OUTPATIENT
Start: 2025-01-13

## 2025-04-25 ENCOUNTER — RA CDI HCC (OUTPATIENT)
Dept: OTHER | Facility: HOSPITAL | Age: 75
End: 2025-04-25

## 2025-05-02 ENCOUNTER — OFFICE VISIT (OUTPATIENT)
Dept: FAMILY MEDICINE CLINIC | Facility: CLINIC | Age: 75
End: 2025-05-02

## 2025-05-02 ENCOUNTER — APPOINTMENT (OUTPATIENT)
Dept: LAB | Facility: CLINIC | Age: 75
End: 2025-05-02
Payer: MEDICARE

## 2025-05-02 VITALS
SYSTOLIC BLOOD PRESSURE: 140 MMHG | WEIGHT: 255 LBS | HEART RATE: 76 BPM | OXYGEN SATURATION: 95 % | DIASTOLIC BLOOD PRESSURE: 70 MMHG | BODY MASS INDEX: 36.51 KG/M2 | HEIGHT: 70 IN

## 2025-05-02 DIAGNOSIS — E27.9 ADRENAL NODULE (HCC): ICD-10-CM

## 2025-05-02 DIAGNOSIS — I25.10 CORONARY ARTERY DISEASE INVOLVING NATIVE CORONARY ARTERY OF NATIVE HEART WITHOUT ANGINA PECTORIS: ICD-10-CM

## 2025-05-02 DIAGNOSIS — E66.01 MORBID OBESITY (HCC): ICD-10-CM

## 2025-05-02 DIAGNOSIS — K02.9 DENTAL CARIES: ICD-10-CM

## 2025-05-02 DIAGNOSIS — J30.9 ALLERGIC RHINITIS, UNSPECIFIED SEASONALITY, UNSPECIFIED TRIGGER: ICD-10-CM

## 2025-05-02 DIAGNOSIS — Z00.00 MEDICARE ANNUAL WELLNESS VISIT, SUBSEQUENT: Primary | ICD-10-CM

## 2025-05-02 DIAGNOSIS — I10 BENIGN ESSENTIAL HYPERTENSION: ICD-10-CM

## 2025-05-02 DIAGNOSIS — M48.061 DEGENERATIVE LUMBAR SPINAL STENOSIS: ICD-10-CM

## 2025-05-02 PROBLEM — E66.9 OBESITY (BMI 30-39.9): Status: RESOLVED | Noted: 2019-11-13 | Resolved: 2025-05-02

## 2025-05-02 LAB
ALBUMIN SERPL BCG-MCNC: 4.2 G/DL (ref 3.5–5)
ALP SERPL-CCNC: 75 U/L (ref 34–104)
ALT SERPL W P-5'-P-CCNC: 19 U/L (ref 7–52)
ANION GAP SERPL CALCULATED.3IONS-SCNC: 7 MMOL/L (ref 4–13)
AST SERPL W P-5'-P-CCNC: 25 U/L (ref 13–39)
BASOPHILS # BLD AUTO: 0.05 THOUSANDS/ÂΜL (ref 0–0.1)
BASOPHILS NFR BLD AUTO: 1 % (ref 0–1)
BILIRUB SERPL-MCNC: 1.85 MG/DL (ref 0.2–1)
BUN SERPL-MCNC: 16 MG/DL (ref 5–25)
CALCIUM SERPL-MCNC: 9.7 MG/DL (ref 8.4–10.2)
CHLORIDE SERPL-SCNC: 102 MMOL/L (ref 96–108)
CHOLEST SERPL-MCNC: 118 MG/DL (ref ?–200)
CO2 SERPL-SCNC: 31 MMOL/L (ref 21–32)
CREAT SERPL-MCNC: 0.87 MG/DL (ref 0.6–1.3)
EOSINOPHIL # BLD AUTO: 0.25 THOUSAND/ÂΜL (ref 0–0.61)
EOSINOPHIL NFR BLD AUTO: 2 % (ref 0–6)
ERYTHROCYTE [DISTWIDTH] IN BLOOD BY AUTOMATED COUNT: 13.2 % (ref 11.6–15.1)
GFR SERPL CREATININE-BSD FRML MDRD: 83 ML/MIN/1.73SQ M
GLUCOSE P FAST SERPL-MCNC: 85 MG/DL (ref 65–99)
HCT VFR BLD AUTO: 48.1 % (ref 36.5–49.3)
HDLC SERPL-MCNC: 44 MG/DL
HGB BLD-MCNC: 15 G/DL (ref 12–17)
IMM GRANULOCYTES # BLD AUTO: 0.05 THOUSAND/UL (ref 0–0.2)
IMM GRANULOCYTES NFR BLD AUTO: 1 % (ref 0–2)
LDLC SERPL CALC-MCNC: 55 MG/DL (ref 0–100)
LYMPHOCYTES # BLD AUTO: 2.25 THOUSANDS/ÂΜL (ref 0.6–4.47)
LYMPHOCYTES NFR BLD AUTO: 21 % (ref 14–44)
MCH RBC QN AUTO: 31.4 PG (ref 26.8–34.3)
MCHC RBC AUTO-ENTMCNC: 31.2 G/DL (ref 31.4–37.4)
MCV RBC AUTO: 101 FL (ref 82–98)
MONOCYTES # BLD AUTO: 0.82 THOUSAND/ÂΜL (ref 0.17–1.22)
MONOCYTES NFR BLD AUTO: 8 % (ref 4–12)
NEUTROPHILS # BLD AUTO: 7.49 THOUSANDS/ÂΜL (ref 1.85–7.62)
NEUTS SEG NFR BLD AUTO: 67 % (ref 43–75)
NONHDLC SERPL-MCNC: 74 MG/DL
NRBC BLD AUTO-RTO: 0 /100 WBCS
PLATELET # BLD AUTO: 252 THOUSANDS/UL (ref 149–390)
PMV BLD AUTO: 9.7 FL (ref 8.9–12.7)
POTASSIUM SERPL-SCNC: 5 MMOL/L (ref 3.5–5.3)
PROT SERPL-MCNC: 7 G/DL (ref 6.4–8.4)
RBC # BLD AUTO: 4.78 MILLION/UL (ref 3.88–5.62)
SODIUM SERPL-SCNC: 140 MMOL/L (ref 135–147)
TRIGL SERPL-MCNC: 96 MG/DL (ref ?–150)
TSH SERPL DL<=0.05 MIU/L-ACNC: 0.93 UIU/ML (ref 0.45–4.5)
WBC # BLD AUTO: 10.91 THOUSAND/UL (ref 4.31–10.16)

## 2025-05-02 PROCEDURE — 36415 COLL VENOUS BLD VENIPUNCTURE: CPT

## 2025-05-02 PROCEDURE — 84443 ASSAY THYROID STIM HORMONE: CPT

## 2025-05-02 PROCEDURE — 80061 LIPID PANEL: CPT

## 2025-05-02 RX ORDER — CELECOXIB 200 MG/1
200 CAPSULE ORAL 2 TIMES DAILY
Qty: 30 CAPSULE | Refills: 1 | Status: SHIPPED | OUTPATIENT
Start: 2025-05-02

## 2025-05-02 RX ORDER — MONTELUKAST SODIUM 10 MG/1
10 TABLET ORAL
Qty: 30 TABLET | Refills: 1 | Status: SHIPPED | OUTPATIENT
Start: 2025-05-02

## 2025-05-02 NOTE — PROGRESS NOTES
Name: Charles Franklin      : 1950      MRN: [id number]  Encounter Provider: Deep Le DO  Encounter Date: 2025   Encounter department: [facility]  :  Assessment & Plan  Medicare annual wellness visit, subsequent  Subsequent annual wellness visit completed       Allergic rhinitis, unspecified seasonality, unspecified trigger  Allergies have been acting up.  Patient has never been on anything aside from Flonase and over-the-counter second-generation antihistamine.  Prescription provided for Singulair  Orders:  •  montelukast (SINGULAIR) 10 mg tablet; Take 1 tablet (10 mg total) by mouth daily at bedtime    Morbid obesity (HCC)  Weight loss would be beneficial at 74 years of age.  Orders:  •  TSH, 3rd generation with Free T4 reflex; Future    Degenerative lumbar spinal stenosis  Meloxicam provides some relief but still has significant arthritic pain.  Order provided for Celebrex 200 mg once daily as a trial  Orders:  •  celecoxib (CeleBREX) 200 mg capsule; Take 1 capsule (200 mg total) by mouth 2 (two) times a day    Adrenal nodule (HCC)    Orders:  •  TSH, 3rd generation with Free T4 reflex; Future    Coronary artery disease involving native coronary artery of native heart without angina pectoris  Managed by cardiology.  Control risk factors.  Continue on statin, aspirin and beta-blocker.  Goal LDL less than 70.  Order provided for repeat lipid panel  Orders:  •  CBC and differential; Future  •  Comprehensive metabolic panel; Future  •  Lipid panel; Future  •  TSH, 3rd generation with Free T4 reflex; Future    Benign essential hypertension  Managed by cardiology.  Remains on Toprol XL 50 mg twice daily which is prescribed by cardiology  Orders:  •  TSH, 3rd generation with Free T4 reflex; Future    Dental caries  Patient should see a dentist.  He is aware         Depression Screening and Follow-up Plan: Patient was screened for depression during today's encounter. They screened  negative with a PHQ-2 score of 0.        Preventive health issues were discussed with patient, and age appropriate screening tests were ordered as noted in patient's After Visit Summary. Personalized health advice and appropriate referrals for health education or preventive services given if needed, as noted in patient's After Visit Summary.    History of Present Illness     74-year-old male presents with his wife for subsequent annual wellness visit and follow-up of chronic conditions.  Does see cardiology regularly given his history of coronary artery disease.  Did see cardiologist in follow-up in November.  No changes.  Continues to have lower back pain.  I did place him on meloxicam which did make some minimal improvement.  Patient complains of allergy symptoms including nasal congestion and sneezing.  Has fluticasone nasal spray and does take over-the-counter second-generation antihistamines but nothing seems to be working as well at this time.  No recent labs       Patient Care Team:  Deep Le DO as PCP - General (Family Medicine)  MD Carlos Cedillo MD Francis Burt, MD    Review of Systems   Constitutional: Negative.  Negative for chills and fever.   HENT:  Positive for congestion and sinus pressure.    Eyes: Negative.    Respiratory: Negative.     Cardiovascular: Negative.    Gastrointestinal: Negative.    Endocrine: Negative.    Genitourinary: Negative.    Musculoskeletal:  Positive for arthralgias, back pain and neck pain.   Skin: Negative.    Allergic/Immunologic: Negative.    Neurological: Negative.    Hematological: Negative.    Psychiatric/Behavioral: Negative.     All other systems reviewed and are negative.    Medical History Reviewed by provider this encounter:  Meds  Problems       Annual Wellness Visit Questionnaire   Charles is here for his Subsequent Wellness visit. Last Medicare Wellness visit information reviewed, patient interviewed, no change since last [facility].     Health  Risk Assessment:   Patient rates overall health as fair. Patient feels that their physical health rating is same. Patient is satisfied with their life. Eyesight was rated as same. Hearing was rated as same. Patient feels that their emotional and mental health rating is same. Patients states they are never, rarely angry. Patient states they are sometimes unusually tired/fatigued. Pain experienced in the last 7 days has been some. Patient's pain rating has been 3/10. Patient states that he has experienced no weight loss or gain in last 6 months. Patient reports occasional pain from arthritis.     Depression Screening:   PHQ-2 Score: 0      Fall Risk Screening:   In the past year, patient has experienced: no history of falling in past year      Home Safety:  Patient does not have trouble with stairs inside or outside of their home. Patient has working smoke alarms and has working carbon monoxide detector. Home safety hazards include: none.     Nutrition:   Current diet is Unhealthy.     Medications:   Patient is currently taking over-the-counter supplements. OTC medications include: see medication list. Patient is able to manage medications.     Activities of Daily Living (ADLs)/Instrumental Activities of Daily Living (IADLs):   Walk and transfer into and out of bed and chair?: Yes  Dress and groom yourself?: Yes    Bathe or shower yourself?: Yes    Feed yourself? Yes  Do your laundry/housekeeping?: Yes  Manage your money, pay your bills and track your expenses?: Yes  Make your own meals?: Yes    Do your own shopping?: Yes    Previous Hospitalizations:   Any hospitalizations or ED visits within the last 12 months?: No      Advance Care Planning:   Living will: Yes    Durable POA for healthcare: Yes    Advanced directive: Yes    Advanced directive counseling given: No    Five wishes given: No    Patient declined ACP directive: No    End of Life Decisions reviewed with patient: Yes    Provider agrees with end of life  decisions: Yes      Cognitive Screening:   Provider or family/friend/caregiver concerned regarding cognition?: No    Preventive Screenings      Cardiovascular Screening:    General: Screening Current      Diabetes Screening:     General: Screening Current      Colorectal Cancer Screening:     General: Screening Current      Prostate Cancer Screening:    General: Screening Not Indicated      Osteoporosis Screening:    General: Risks and Benefits Discussed and Patient Declines      Abdominal Aortic Aneurysm (AAA) Screening:    Risk factors include: age between 65-76 yo and tobacco use        General: Risks and Benefits Discussed and Patient Declines      Lung Cancer Screening:     General: Screening Not Indicated      Hepatitis C Screening:    General: Screening Current    Hep C Screening Accepted: No     Screening, Brief Intervention, and Referral to Treatment (SBIRT)     Screening  Typical number of drinks in a day: 0  Typical number of drinks in a week: 0  Interpretation: Low risk drinking behavior.    Single Item Drug Screening:  How often have you used an illegal drug (including marijuana) or a prescription medication for non-medical reasons in the past year? never    Single Item Drug Screen Score: 0  Interpretation: Negative screen for possible drug use disorder    Brief Intervention  Alcohol & drug use screenings were reviewed. No concerns regarding substance use disorder identified.     Other Counseling Topics:   Car/seat belt/driving safety, skin self-exam, sunscreen and regular weightbearing exercise.     Social Drivers of Health     Financial Resource Strain: Low Risk  (2/5/2023)    Overall Financial Resource Strain (CARDIA)    • Difficulty of Paying Living Expenses: Not very hard   Food Insecurity: No Food Insecurity (5/2/2025)    Hunger Vital Sign    • Worried About Running Out of Food in the Last Year: Never true    • Ran Out of Food in the Last Year: Never true   Transportation Needs: No Transportation  "Needs (5/2/2025)    PRAPARE - Transportation    • Lack of Transportation (Medical): No    • Lack of Transportation (Non-Medical): No   Housing Stability: Unknown (5/2/2025)    Housing Stability Vital Sign    • Unable to Pay for Housing in the Last Year: No    • Homeless in the Last Year: No   Utilities: Not At Risk (5/2/2025)    [facility] Utilities    • Threatened with loss of utilities: No     No results found.    Objective   /70   Pulse 76   Ht 5' 10\" (1.778 m)   Wt 116 kg (255 lb)   SpO2 95%   BMI 36.59 kg/m²     Physical Exam  Vitals and nursing note reviewed.   Constitutional:       Appearance: Normal appearance. He is well-developed. He is obese.   HENT:      Head: Normocephalic and atraumatic.      Right Ear: Tympanic membrane, ear canal and external ear normal.      Left Ear: Tympanic membrane, ear canal and external ear normal.      Nose: Nose normal.      Mouth/Throat:      Mouth: Mucous membranes are moist.      Pharynx: Oropharynx is clear.   Eyes:      General: No scleral icterus.        Right eye: No discharge.         Left eye: No discharge.      Extraocular Movements: Extraocular movements intact.      Conjunctiva/sclera: Conjunctivae normal.      Pupils: Pupils are equal, round, and reactive to light.   Cardiovascular:      Rate and Rhythm: Normal rate and regular rhythm.      Pulses: Normal pulses.      Heart sounds: Normal heart sounds.   Pulmonary:      Effort: Pulmonary effort is normal.      Breath sounds: Normal breath sounds.   Abdominal:      General: Bowel sounds are normal.      Palpations: Abdomen is soft.   Genitourinary:     Penis: Normal.       Prostate: Normal.      Rectum: Normal.   Musculoskeletal:         General: Normal range of motion.      Cervical back: Normal range of motion and neck supple.   Skin:     General: Skin is warm and dry.   Neurological:      General: No focal deficit present.      Mental Status: He is alert and oriented to person, place, and time. Mental " status is at baseline.   Psychiatric:         Mood and Affect: Mood normal.         Behavior: Behavior normal.         Thought Content: Thought content normal.         Judgment: Judgment normal.

## 2025-05-02 NOTE — ASSESSMENT & PLAN NOTE
Allergies have been acting up.  Patient has never been on anything aside from Flonase and over-the-counter second-generation antihistamine.  Prescription provided for Singulair  Orders:  •  montelukast (SINGULAIR) 10 mg tablet; Take 1 tablet (10 mg total) by mouth daily at bedtime

## 2025-05-02 NOTE — ASSESSMENT & PLAN NOTE
Weight loss would be beneficial at 74 years of age.  Orders:  •  TSH, 3rd generation with Free T4 reflex; Future

## 2025-05-02 NOTE — ASSESSMENT & PLAN NOTE
Managed by cardiology.  Remains on Toprol XL 50 mg twice daily which is prescribed by cardiology  Orders:  •  TSH, 3rd generation with Free T4 reflex; Future

## 2025-05-02 NOTE — ASSESSMENT & PLAN NOTE
Managed by cardiology.  Control risk factors.  Continue on statin, aspirin and beta-blocker.  Goal LDL less than 70.  Order provided for repeat lipid panel  Orders:  •  CBC and differential; Future  •  Comprehensive metabolic panel; Future  •  Lipid panel; Future  •  TSH, 3rd generation with Free T4 reflex; Future

## 2025-05-02 NOTE — ASSESSMENT & PLAN NOTE
Meloxicam provides some relief but still has significant arthritic pain.  Order provided for Celebrex 200 mg once daily as a trial  Orders:  •  celecoxib (CeleBREX) 200 mg capsule; Take 1 capsule (200 mg total) by mouth 2 (two) times a day

## 2025-05-02 NOTE — PATIENT INSTRUCTIONS
Medicare Preventive Visit Patient Instructions  Thank you for completing your Welcome to Medicare Visit or Medicare Annual Wellness Visit today. Your next wellness visit will be due in one year (5/3/2026).  The screening/preventive services that you may require over the next 5-10 years are detailed below. Some tests may not apply to you based off risk factors and/or age. Screening tests ordered at today's visit but not completed yet may show as past due. Also, please note that scanned in results may not display below.  Preventive Screenings:  Service Recommendations Previous Testing/Comments   Colorectal Cancer Screening  Colonoscopy    Fecal Occult Blood Test (FOBT)/Fecal Immunochemical Test (FIT)  Fecal DNA/Cologuard Test  Flexible Sigmoidoscopy Age: 45-75 years old   Colonoscopy: every 10 years (May be performed more frequently if at higher risk)  OR  FOBT/FIT: every 1 year  OR  Cologuard: every 3 years  OR  Sigmoidoscopy: every 5 years  Screening may be recommended earlier than age 45 if at higher risk for colorectal cancer. Also, an individualized decision between you and your healthcare provider will decide whether screening between the ages of 76-85 would be appropriate. Colonoscopy: Not on file  FOBT/FIT: Not on file  Cologuard: 02/14/2023  Sigmoidoscopy: Not on file    Screening Current     Prostate Cancer Screening Individualized decision between patient and health care provider in men between ages of 55-69   Medicare will cover every 12 months beginning on the day after your 50th birthday PSA: 0.245 ng/mL     Screening Not Indicated     Hepatitis C Screening Once for adults born between 1945 and 1965  More frequently in patients at high risk for Hepatitis C Hep C Antibody: Not on file    Screening Current   Diabetes Screening 1-2 times per year if you're at risk for diabetes or have pre-diabetes Fasting glucose: 86 mg/dL (9/26/2024)  A1C: No results in last 5 years (No results in last 5 years)  Screening  Current   Cholesterol Screening Once every 5 years if you don't have a lipid disorder. May order more often based on risk factors. Lipid panel: 09/26/2024  Screening Current      Other Preventive Screenings Covered by Medicare:  Abdominal Aortic Aneurysm (AAA) Screening: covered once if your at risk. You're considered to be at risk if you have a family history of AAA or a male between the age of 65-75 who smoking at least 100 cigarettes in your lifetime.  Lung Cancer Screening: covers low dose CT scan once per year if you meet all of the following conditions: (1) Age 55-77; (2) No signs or symptoms of lung cancer; (3) Current smoker or have quit smoking within the last 15 years; (4) You have a tobacco smoking history of at least 20 pack years (packs per day x number of years you smoked); (5) You get a written order from a healthcare provider.  Glaucoma Screening: covered annually if you're considered high risk: (1) You have diabetes OR (2) Family history of glaucoma OR (3)  aged 50 and older OR (4)  American aged 65 and older  Osteoporosis Screening: covered every 2 years if you meet one of the following conditions: (1) Have a vertebral abnormality; (2) On glucocorticoid therapy for more than 3 months; (3) Have primary hyperparathyroidism; (4) On osteoporosis medications and need to assess response to drug therapy.  HIV Screening: covered annually if you're between the age of 15-65. Also covered annually if you are younger than 15 and older than 65 with risk factors for HIV infection. For pregnant patients, it is covered up to 3 times per pregnancy.    Immunizations:  Immunization Recommendations   Influenza Vaccine Annual influenza vaccination during flu season is recommended for all persons aged >= 6 months who do not have contraindications   Pneumococcal Vaccine   * Pneumococcal conjugate vaccine = PCV13 (Prevnar 13), PCV15 (Vaxneuvance), PCV20 (Prevnar 20)  * Pneumococcal polysaccharide  vaccine = PPSV23 (Pneumovax) Adults 19-65 yo with certain risk factors or if 65+ yo  If never received any pneumonia vaccine: recommend Prevnar 20 (PCV20)  Give PCV20 if previously received 1 dose of PCV13 or PPSV23   Hepatitis B Vaccine 3 dose series if at intermediate or high risk (ex: diabetes, end stage renal disease, liver disease)   Respiratory syncytial virus (RSV) Vaccine - COVERED BY MEDICARE PART D  * RSVPreF3 (Arexvy) CDC recommends that adults 60 years of age and older may receive a single dose of RSV vaccine using shared clinical decision-making (SCDM)   Tetanus (Td) Vaccine - COST NOT COVERED BY MEDICARE PART B Following completion of primary series, a booster dose should be given every 10 years to maintain immunity against tetanus. Td may also be given as tetanus wound prophylaxis.   Tdap Vaccine - COST NOT COVERED BY MEDICARE PART B Recommended at least once for all adults. For pregnant patients, recommended with each pregnancy.   Shingles Vaccine (Shingrix) - COST NOT COVERED BY MEDICARE PART B  2 shot series recommended in those 19 years and older who have or will have weakened immune systems or those 50 years and older     Health Maintenance Due:      Topic Date Due   • Hepatitis C Screening  09/23/2085 (Originally 1950)   • Colorectal Cancer Screening  Discontinued     Immunizations Due:      Topic Date Due   • COVID-19 Vaccine (8 - 2024-25 season) 03/30/2025     Advance Directives   What are advance directives?  Advance directives are legal documents that state your wishes and plans for medical care. These plans are made ahead of time in case you lose your ability to make decisions for yourself. Advance directives can apply to any medical decision, such as the treatments you want, and if you want to donate organs.   What are the types of advance directives?  There are many types of advance directives, and each state has rules about how to use them. You may choose a combination of any of the  following:  Living will:  This is a written record of the treatment you want. You can also choose which treatments you do not want, which to limit, and which to stop at a certain time. This includes surgery, medicine, IV fluid, and tube feedings.   Durable power of  for healthcare (DPAHC):  This is a written record that states who you want to make healthcare choices for you when you are unable to make them for yourself. This person, called a proxy, is usually a family member or a friend. You may choose more than 1 proxy.  Do not resuscitate (DNR) order:  A DNR order is used in case your heart stops beating or you stop breathing. It is a request not to have certain forms of treatment, such as CPR. A DNR order may be included in other types of advance directives.  Medical directive:  This covers the care that you want if you are in a coma, near death, or unable to make decisions for yourself. You can list the treatments you want for each condition. Treatment may include pain medicine, surgery, blood transfusions, dialysis, IV or tube feedings, and a ventilator (breathing machine).  Values history:  This document has questions about your views, beliefs, and how you feel and think about life. This information can help others choose the care that you would choose.  Why are advance directives important?  An advance directive helps you control your care. Although spoken wishes may be used, it is better to have your wishes written down. Spoken wishes can be misunderstood, or not followed. Treatments may be given even if you do not want them. An advance directive may make it easier for your family to make difficult choices about your care.   Weight Management   Why it is important to manage your weight:  Being overweight increases your risk of health conditions such as heart disease, high blood pressure, type 2 diabetes, and certain types of cancer. It can also increase your risk for osteoarthritis, sleep apnea, and  other respiratory problems. Aim for a slow, steady weight loss. Even a small amount of weight loss can lower your risk of health problems.  How to lose weight safely:  A safe and healthy way to lose weight is to eat fewer calories and get regular exercise. You can lose up about 1 pound a week by decreasing the number of calories you eat by 500 calories each day.   Healthy meal plan for weight management:  A healthy meal plan includes a variety of foods, contains fewer calories, and helps you stay healthy. A healthy meal plan includes the following:  Eat whole-grain foods more often.  A healthy meal plan should contain fiber. Fiber is the part of grains, fruits, and vegetables that is not broken down by your body. Whole-grain foods are healthy and provide extra fiber in your diet. Some examples of whole-grain foods are whole-wheat breads and pastas, oatmeal, brown rice, and bulgur.  Eat a variety of vegetables every day.  Include dark, leafy greens such as spinach, kale, miles greens, and mustard greens. Eat yellow and orange vegetables such as carrots, sweet potatoes, and winter squash.   Eat a variety of fruits every day.  Choose fresh or canned fruit (canned in its own juice or light syrup) instead of juice. Fruit juice has very little or no fiber.  Eat low-fat dairy foods.  Drink fat-free (skim) milk or 1% milk. Eat fat-free yogurt and low-fat cottage cheese. Try low-fat cheeses such as mozzarella and other reduced-fat cheeses.  Choose meat and other protein foods that are low in fat.  Choose beans or other legumes such as split peas or lentils. Choose fish, skinless poultry (chicken or turkey), or lean cuts of red meat (beef or pork). Before you cook meat or poultry, cut off any visible fat.   Use less fat and oil.  Try baking foods instead of frying them. Add less fat, such as margarine, sour cream, regular salad dressing and mayonnaise to foods. Eat fewer high-fat foods. Some examples of high-fat foods  include french fries, doughnuts, ice cream, and cakes.  Eat fewer sweets.  Limit foods and drinks that are high in sugar. This includes candy, cookies, regular soda, and sweetened drinks.  Exercise:  Exercise at least 30 minutes per day on most days of the week. Some examples of exercise include walking, biking, dancing, and swimming. You can also fit in more physical activity by taking the stairs instead of the elevator or parking farther away from stores. Ask your healthcare provider about the best exercise plan for you.      © Copyright [employer] 2018 Information is for End User's use only and may not be sold, redistributed or otherwise used for commercial purposes. All illustrations and images included in CareNotes® are the copyrighted property of [employer].A[employer]M., Inc[employer] or [employer]

## 2025-05-05 ENCOUNTER — RESULTS FOLLOW-UP (OUTPATIENT)
Dept: FAMILY MEDICINE CLINIC | Facility: CLINIC | Age: 75
End: 2025-05-05

## 2025-05-05 DIAGNOSIS — I10 BENIGN ESSENTIAL HYPERTENSION: Primary | ICD-10-CM

## 2025-05-05 DIAGNOSIS — Z12.5 PROSTATE CANCER SCREENING: ICD-10-CM

## 2025-05-05 DIAGNOSIS — I25.10 CORONARY ARTERY DISEASE INVOLVING NATIVE CORONARY ARTERY OF NATIVE HEART WITHOUT ANGINA PECTORIS: ICD-10-CM

## 2025-05-05 DIAGNOSIS — R17 ELEVATED BILIRUBIN: ICD-10-CM

## 2025-05-05 NOTE — RESULT ENCOUNTER NOTE
Please call patient and notify that results are normal. No changes in medications. Please keep follow-up appointment as scheduled on November 7.  Labs ordered to be done prior to that appointment

## 2025-05-06 DIAGNOSIS — J30.9 ALLERGIC RHINITIS, UNSPECIFIED SEASONALITY, UNSPECIFIED TRIGGER: ICD-10-CM

## 2025-05-06 DIAGNOSIS — M48.061 DEGENERATIVE LUMBAR SPINAL STENOSIS: ICD-10-CM

## 2025-05-06 NOTE — TELEPHONE ENCOUNTER
Pts wife calling in to request a 3 months supply of the pts medication (montelukast & celecoxib) please be sent to [facility]

## 2025-05-07 RX ORDER — MONTELUKAST SODIUM 10 MG/1
10 TABLET ORAL
Qty: 90 TABLET | Refills: 0 | Status: SHIPPED | OUTPATIENT
Start: 2025-05-07

## 2025-05-07 RX ORDER — CELECOXIB 200 MG/1
200 CAPSULE ORAL 2 TIMES DAILY
Qty: 90 CAPSULE | Refills: 0 | Status: SHIPPED | OUTPATIENT
Start: 2025-05-07

## 2025-05-14 NOTE — TELEPHONE ENCOUNTER
Patient called requesting refill for Celebrex. Patient made aware medication was refilled on 5/7/25 for 90 with 0 refills to [facility]  pharmacy. Patient instructed to contact the pharmacy and speak with someone directly to obtain refills of medication. Patient advised to call back for refill if their pharmacy is unable to assist them. Patient verbalized understanding.

## 2025-05-21 DIAGNOSIS — I49.3 PVC (PREMATURE VENTRICULAR CONTRACTION): ICD-10-CM

## 2025-05-21 DIAGNOSIS — I25.10 CORONARY ARTERY DISEASE INVOLVING NATIVE CORONARY ARTERY OF NATIVE HEART WITHOUT ANGINA PECTORIS: ICD-10-CM

## 2025-05-22 RX ORDER — METOPROLOL SUCCINATE 50 MG/1
50 TABLET, EXTENDED RELEASE ORAL 2 TIMES DAILY
Qty: 180 TABLET | Refills: 1 | Status: SHIPPED | OUTPATIENT
Start: 2025-05-22

## 2025-05-28 DIAGNOSIS — I25.10 CAD (CORONARY ARTERY DISEASE): ICD-10-CM

## 2025-05-29 RX ORDER — ATORVASTATIN CALCIUM 40 MG/1
40 TABLET, FILM COATED ORAL DAILY
Qty: 90 TABLET | Refills: 1 | Status: SHIPPED | OUTPATIENT
Start: 2025-05-29

## 2025-06-01 PROBLEM — Z00.00 MEDICARE ANNUAL WELLNESS VISIT, SUBSEQUENT: Status: RESOLVED | Noted: 2023-02-06 | Resolved: 2025-06-01

## 2025-06-13 DIAGNOSIS — M48.061 DEGENERATIVE LUMBAR SPINAL STENOSIS: ICD-10-CM

## 2025-06-13 RX ORDER — CELECOXIB 200 MG/1
200 CAPSULE ORAL 2 TIMES DAILY
Qty: 90 CAPSULE | Refills: 7 | OUTPATIENT
Start: 2025-06-13

## 2025-06-13 RX ORDER — CELECOXIB 200 MG/1
200 CAPSULE ORAL DAILY
Qty: 90 CAPSULE | Refills: 1 | Status: SHIPPED | OUTPATIENT
Start: 2025-06-13

## 2025-06-13 NOTE — TELEPHONE ENCOUNTER
No, I do not provide a 1 year supply.  That was given as a trial at the beginning of May.  I can provide a 6-month supply

## 2025-06-27 DIAGNOSIS — M48.061 DEGENERATIVE LUMBAR SPINAL STENOSIS: ICD-10-CM

## 2025-06-27 RX ORDER — CELECOXIB 200 MG/1
200 CAPSULE ORAL DAILY
Qty: 90 CAPSULE | Refills: 1 | Status: SHIPPED | OUTPATIENT
Start: 2025-06-27

## 2025-07-24 DIAGNOSIS — J30.9 ALLERGIC RHINITIS, UNSPECIFIED SEASONALITY, UNSPECIFIED TRIGGER: ICD-10-CM

## 2025-07-25 RX ORDER — MONTELUKAST SODIUM 10 MG/1
10 TABLET ORAL
Qty: 90 TABLET | Refills: 1 | Status: SHIPPED | OUTPATIENT
Start: 2025-07-25

## 2025-08-01 DIAGNOSIS — M48.061 DEGENERATIVE LUMBAR SPINAL STENOSIS: ICD-10-CM

## 2025-08-01 RX ORDER — CELECOXIB 200 MG/1
200 CAPSULE ORAL 2 TIMES DAILY
Qty: 180 CAPSULE | Refills: 1 | Status: SHIPPED | OUTPATIENT
Start: 2025-08-01 | End: 2025-08-04 | Stop reason: CLARIF

## 2025-08-04 ENCOUNTER — NURSE TRIAGE (OUTPATIENT)
Age: 75
End: 2025-08-04

## 2025-08-04 DIAGNOSIS — M48.061 DEGENERATIVE LUMBAR SPINAL STENOSIS: ICD-10-CM

## 2025-08-04 RX ORDER — CELECOXIB 200 MG/1
200 CAPSULE ORAL 2 TIMES DAILY
Qty: 180 CAPSULE | Refills: 1 | Status: SHIPPED | OUTPATIENT
Start: 2025-08-04

## (undated) DEVICE — RADIOLOGY STERILE LABELS: Brand: CENTURION

## (undated) DEVICE — WIPES BABY PAMPERS SENSITIVE 36/PK

## (undated) DEVICE — SKIN MARKER DUAL TIP WITH RULER CAP, FLEXIBLE RULER AND LABELS: Brand: DEVON

## (undated) DEVICE — NEEDLE BLUNT 18 G X 1 1/2 W FILTER

## (undated) DEVICE — SMALL NEEDLE COUNTER NEST

## (undated) DEVICE — UNIVERSAL BLOCK TRAY: Brand: INTEGRA®

## (undated) DEVICE — TOWEL SET X-RAY

## (undated) DEVICE — SYRINGE 5ML LL

## (undated) DEVICE — PLASTIC ADHESIVE BANDAGE: Brand: CURITY

## (undated) DEVICE — GLOVE SRG BIOGEL 7.5

## (undated) DEVICE — PAD GROUNDING SLF ADHESIVE

## (undated) DEVICE — CHLORAPREP APPLICATOR TINTED 10.5ML ONE-STEP

## (undated) DEVICE — NEEDLE SPINAL 25G X 3.5 IN QUINCKE

## (undated) DEVICE — ATTUNE KNEE SYSTEM REVISION FEMORAL SLEEVE POROCOAT FULLY COATED 35MM: Brand: ATTUNE

## (undated) DEVICE — Device

## (undated) DEVICE — DRAPE SHEET THREE QUARTER

## (undated) DEVICE — CVD CANNULA